# Patient Record
Sex: FEMALE | Race: WHITE | HISPANIC OR LATINO | Employment: FULL TIME | URBAN - METROPOLITAN AREA
[De-identification: names, ages, dates, MRNs, and addresses within clinical notes are randomized per-mention and may not be internally consistent; named-entity substitution may affect disease eponyms.]

---

## 2017-02-13 ENCOUNTER — APPOINTMENT (OUTPATIENT)
Dept: GENERAL RADIOLOGY | Facility: HOSPITAL | Age: 57
End: 2017-02-13

## 2017-02-13 ENCOUNTER — HOSPITAL ENCOUNTER (EMERGENCY)
Facility: HOSPITAL | Age: 57
Discharge: HOME | End: 2017-02-13
Attending: EMERGENCY MEDICINE | Admitting: EMERGENCY MEDICINE

## 2017-02-13 VITALS
WEIGHT: 228 LBS | OXYGEN SATURATION: 92 % | BODY MASS INDEX: 41.96 KG/M2 | DIASTOLIC BLOOD PRESSURE: 71 MMHG | TEMPERATURE: 99.4 F | SYSTOLIC BLOOD PRESSURE: 134 MMHG | RESPIRATION RATE: 22 BRPM | HEART RATE: 93 BPM | HEIGHT: 62 IN

## 2017-02-13 DIAGNOSIS — J10.1 INFLUENZA A: Primary | ICD-10-CM

## 2017-02-13 LAB
ALBUMIN SERPL-MCNC: 4.3 G/DL (ref 3.2–4.8)
ALBUMIN/GLOB SERPL: 1.2 G/DL (ref 1.5–2.5)
ALP SERPL-CCNC: 117 U/L (ref 25–100)
ALT SERPL W P-5'-P-CCNC: 52 U/L (ref 7–40)
ANION GAP SERPL CALCULATED.3IONS-SCNC: 1 MMOL/L (ref 3–11)
AST SERPL-CCNC: 35 U/L (ref 0–33)
BASOPHILS # BLD AUTO: 0.01 10*3/MM3 (ref 0–0.2)
BASOPHILS NFR BLD AUTO: 0.1 % (ref 0–1)
BILIRUB SERPL-MCNC: 0.7 MG/DL (ref 0.3–1.2)
BNP SERPL-MCNC: 52 PG/ML (ref 0–100)
BUN BLD-MCNC: 11 MG/DL (ref 9–23)
BUN/CREAT SERPL: 18.3 (ref 7–25)
CALCIUM SPEC-SCNC: 9.8 MG/DL (ref 8.7–10.4)
CHLORIDE SERPL-SCNC: 98 MMOL/L (ref 99–109)
CO2 SERPL-SCNC: 35 MMOL/L (ref 20–31)
CREAT BLD-MCNC: 0.6 MG/DL (ref 0.6–1.3)
DEPRECATED RDW RBC AUTO: 41.4 FL (ref 37–54)
EOSINOPHIL # BLD AUTO: 0.03 10*3/MM3 (ref 0.1–0.3)
EOSINOPHIL NFR BLD AUTO: 0.4 % (ref 0–3)
ERYTHROCYTE [DISTWIDTH] IN BLOOD BY AUTOMATED COUNT: 12.7 % (ref 11.3–14.5)
FLUAV AG NPH QL: POSITIVE
FLUBV AG NPH QL IA: NEGATIVE
GFR SERPL CREATININE-BSD FRML MDRD: 103 ML/MIN/1.73
GLOBULIN UR ELPH-MCNC: 3.5 GM/DL
GLUCOSE BLD-MCNC: 117 MG/DL (ref 70–100)
HCT VFR BLD AUTO: 43.6 % (ref 34.5–44)
HGB BLD-MCNC: 14.7 G/DL (ref 11.5–15.5)
HOLD SPECIMEN: NORMAL
HOLD SPECIMEN: NORMAL
IMM GRANULOCYTES # BLD: 0.03 10*3/MM3 (ref 0–0.03)
IMM GRANULOCYTES NFR BLD: 0.4 % (ref 0–0.6)
LIPASE SERPL-CCNC: 29 U/L (ref 6–51)
LYMPHOCYTES # BLD AUTO: 0.58 10*3/MM3 (ref 0.6–4.8)
LYMPHOCYTES NFR BLD AUTO: 7.4 % (ref 24–44)
MCH RBC QN AUTO: 30.2 PG (ref 27–31)
MCHC RBC AUTO-ENTMCNC: 33.7 G/DL (ref 32–36)
MCV RBC AUTO: 89.5 FL (ref 80–99)
MONOCYTES # BLD AUTO: 0.7 10*3/MM3 (ref 0–1)
MONOCYTES NFR BLD AUTO: 9 % (ref 0–12)
NEUTROPHILS # BLD AUTO: 6.44 10*3/MM3 (ref 1.5–8.3)
NEUTROPHILS NFR BLD AUTO: 82.7 % (ref 41–71)
PLATELET # BLD AUTO: 201 10*3/MM3 (ref 150–450)
PMV BLD AUTO: 11.2 FL (ref 6–12)
POTASSIUM BLD-SCNC: 3.9 MMOL/L (ref 3.5–5.5)
PROT SERPL-MCNC: 7.8 G/DL (ref 5.7–8.2)
RBC # BLD AUTO: 4.87 10*6/MM3 (ref 3.89–5.14)
SODIUM BLD-SCNC: 134 MMOL/L (ref 132–146)
TROPONIN I SERPL-MCNC: 0 NG/ML (ref 0–0.07)
WBC NRBC COR # BLD: 7.79 10*3/MM3 (ref 3.5–10.8)
WHOLE BLOOD HOLD SPECIMEN: NORMAL
WHOLE BLOOD HOLD SPECIMEN: NORMAL

## 2017-02-13 PROCEDURE — 25010000002 KETOROLAC TROMETHAMINE PER 15 MG: Performed by: EMERGENCY MEDICINE

## 2017-02-13 PROCEDURE — 85025 COMPLETE CBC W/AUTO DIFF WBC: CPT | Performed by: EMERGENCY MEDICINE

## 2017-02-13 PROCEDURE — 93005 ELECTROCARDIOGRAM TRACING: CPT

## 2017-02-13 PROCEDURE — 96374 THER/PROPH/DIAG INJ IV PUSH: CPT

## 2017-02-13 PROCEDURE — 83880 ASSAY OF NATRIURETIC PEPTIDE: CPT | Performed by: EMERGENCY MEDICINE

## 2017-02-13 PROCEDURE — 99285 EMERGENCY DEPT VISIT HI MDM: CPT

## 2017-02-13 PROCEDURE — 87804 INFLUENZA ASSAY W/OPTIC: CPT | Performed by: EMERGENCY MEDICINE

## 2017-02-13 PROCEDURE — 80053 COMPREHEN METABOLIC PANEL: CPT | Performed by: EMERGENCY MEDICINE

## 2017-02-13 PROCEDURE — 83690 ASSAY OF LIPASE: CPT | Performed by: EMERGENCY MEDICINE

## 2017-02-13 PROCEDURE — 84484 ASSAY OF TROPONIN QUANT: CPT

## 2017-02-13 PROCEDURE — 71020 HC CHEST PA AND LATERAL: CPT

## 2017-02-13 PROCEDURE — 96361 HYDRATE IV INFUSION ADD-ON: CPT

## 2017-02-13 RX ORDER — KETOROLAC TROMETHAMINE 15 MG/ML
15 INJECTION, SOLUTION INTRAMUSCULAR; INTRAVENOUS ONCE
Status: COMPLETED | OUTPATIENT
Start: 2017-02-13 | End: 2017-02-13

## 2017-02-13 RX ORDER — OSELTAMIVIR PHOSPHATE 75 MG/1
75 CAPSULE ORAL 2 TIMES DAILY
Qty: 10 CAPSULE | Refills: 0 | OUTPATIENT
Start: 2017-02-13 | End: 2020-11-27

## 2017-02-13 RX ORDER — ACETAMINOPHEN 500 MG
1000 TABLET ORAL ONCE
Status: COMPLETED | OUTPATIENT
Start: 2017-02-13 | End: 2017-02-13

## 2017-02-13 RX ORDER — SODIUM CHLORIDE 0.9 % (FLUSH) 0.9 %
10 SYRINGE (ML) INJECTION AS NEEDED
Status: DISCONTINUED | OUTPATIENT
Start: 2017-02-13 | End: 2017-02-13 | Stop reason: HOSPADM

## 2017-02-13 RX ADMIN — SODIUM CHLORIDE 1000 ML: 9 INJECTION, SOLUTION INTRAVENOUS at 12:54

## 2017-02-13 RX ADMIN — ACETAMINOPHEN 1000 MG: 500 TABLET ORAL at 12:51

## 2017-02-13 RX ADMIN — KETOROLAC TROMETHAMINE 15 MG: 15 INJECTION, SOLUTION INTRAMUSCULAR; INTRAVENOUS at 12:54

## 2017-02-13 NOTE — ED PROVIDER NOTES
Subjective   HPI Comments: Mrs. Velazquez is a 56 y.o female who presents to the ED c/o flu like sx starting 2 days ago. She reports of an initial dry worsening cough after getting out of the shower two days ago. She has tried taking mucinex without any relief and caused her to experience N/V. She also complains of SOA, fever, chills, runny nose, sore throat, HA, myalgias, and left sided chest pain. She denies taking any other medications other than the mucinex. She has no other acute sx at this time.     Patient is a 56 y.o. female presenting with flu symptoms.   History provided by:  Patient  Flu Symptoms   Presenting symptoms: cough, fever, headache, myalgias, nausea, rhinorrhea, shortness of breath, sore throat and vomiting    Severity:  Moderate  Onset quality:  Sudden  Duration:  2 days  Progression:  Worsening  Chronicity:  New  Relieved by:  Nothing  Worsened by:  Nothing  Ineffective treatments:  Decongestant  Associated symptoms: chills        Review of Systems   Constitutional: Positive for chills and fever.   HENT: Positive for rhinorrhea and sore throat.    Respiratory: Positive for cough and shortness of breath.    Cardiovascular: Positive for chest pain.   Gastrointestinal: Positive for nausea and vomiting.   Musculoskeletal: Positive for myalgias.   Neurological: Positive for headaches.   All other systems reviewed and are negative.      History reviewed. No pertinent past medical history.    No Known Allergies    Past Surgical History   Procedure Laterality Date   • Hysterectomy         History reviewed. No pertinent family history.    Social History     Social History   • Marital status:      Spouse name: N/A   • Number of children: N/A   • Years of education: N/A     Social History Main Topics   • Smoking status: Never Smoker   • Smokeless tobacco: None   • Alcohol use No   • Drug use: No   • Sexual activity: Not Asked     Other Topics Concern   • None     Social History Narrative          Objective   Physical Exam   Constitutional: She is oriented to person, place, and time. She appears well-developed and well-nourished. No distress.   HENT:   Head: Normocephalic and atraumatic.   Right Ear: Tympanic membrane normal.   Left Ear: Tympanic membrane normal.   Mouth/Throat: Posterior oropharyngeal erythema present. No oropharyngeal exudate or posterior oropharyngeal edema.   Eyes: Conjunctivae are normal.   Neck: Normal range of motion. Neck supple.   Cardiovascular: Normal rate, regular rhythm, normal heart sounds and intact distal pulses.    Pulmonary/Chest: Effort normal and breath sounds normal. No respiratory distress.   Abdominal: Soft. There is no tenderness.   Musculoskeletal: Normal range of motion.   Neurological: She is alert and oriented to person, place, and time. She has normal strength.   Skin: Skin is warm and dry.   Psychiatric: She has a normal mood and affect. Her behavior is normal.   Nursing note and vitals reviewed.      Procedures         ED Course  ED Course   Comment By Time   I spoke with Mrs. Velazquez about the positive flu swab.  She tells me it's been less than 48 hours she started feeling bad.  Will prescribe Tamiflu and write her off work for 3 days.  We'll proceed with IV fluids Tylenol and Toradol and then discharge. Severo Garvin MD 02/13 1204                     MDM  Number of Diagnoses or Management Options  Influenza A: new and requires workup     Amount and/or Complexity of Data Reviewed  Clinical lab tests: ordered and reviewed  Tests in the radiology section of CPT®: ordered and reviewed  Independent visualization of images, tracings, or specimens: yes    Patient Progress  Patient progress: improved      Final diagnoses:   Influenza A       Documentation assistance provided by brittni CHIU.  Information recorded by the brittni was done at my direction and has been verified and validated by me.     Jah Chiu  02/13/17 1135       Jah Chiu  02/13/17  1135       Severo Garvin MD  02/13/17 1909

## 2017-11-07 ENCOUNTER — TRANSCRIBE ORDERS (OUTPATIENT)
Dept: MAMMOGRAPHY | Facility: HOSPITAL | Age: 57
End: 2017-11-07

## 2017-11-07 DIAGNOSIS — Z12.31 VISIT FOR SCREENING MAMMOGRAM: Primary | ICD-10-CM

## 2017-11-09 ENCOUNTER — APPOINTMENT (OUTPATIENT)
Dept: MAMMOGRAPHY | Facility: HOSPITAL | Age: 57
End: 2017-11-09

## 2019-01-21 ENCOUNTER — APPOINTMENT (OUTPATIENT)
Dept: GENERAL RADIOLOGY | Facility: HOSPITAL | Age: 59
End: 2019-01-21

## 2019-01-21 ENCOUNTER — HOSPITAL ENCOUNTER (EMERGENCY)
Facility: HOSPITAL | Age: 59
Discharge: HOME | End: 2019-01-21
Attending: EMERGENCY MEDICINE | Admitting: EMERGENCY MEDICINE

## 2019-01-21 VITALS
DIASTOLIC BLOOD PRESSURE: 75 MMHG | SYSTOLIC BLOOD PRESSURE: 168 MMHG | HEART RATE: 79 BPM | OXYGEN SATURATION: 94 % | HEIGHT: 62 IN | RESPIRATION RATE: 18 BRPM | BODY MASS INDEX: 41.96 KG/M2 | WEIGHT: 228 LBS | TEMPERATURE: 98.9 F

## 2019-01-21 DIAGNOSIS — B34.9 VIRAL ILLNESS: ICD-10-CM

## 2019-01-21 DIAGNOSIS — J20.8 VIRAL BRONCHITIS: Primary | ICD-10-CM

## 2019-01-21 LAB
ALBUMIN SERPL-MCNC: 4.16 G/DL (ref 3.2–4.8)
ALBUMIN/GLOB SERPL: 1.5 G/DL (ref 1.5–2.5)
ALP SERPL-CCNC: 110 U/L (ref 25–100)
ALT SERPL W P-5'-P-CCNC: 57 U/L (ref 7–40)
ANION GAP SERPL CALCULATED.3IONS-SCNC: 6 MMOL/L (ref 3–11)
AST SERPL-CCNC: 33 U/L (ref 0–33)
BASOPHILS # BLD AUTO: 0.01 10*3/MM3 (ref 0–0.2)
BASOPHILS NFR BLD AUTO: 0.1 % (ref 0–1)
BILIRUB SERPL-MCNC: 0.5 MG/DL (ref 0.3–1.2)
BNP SERPL-MCNC: 49 PG/ML (ref 0–100)
BUN BLD-MCNC: 10 MG/DL (ref 9–23)
BUN/CREAT SERPL: 16.1 (ref 7–25)
CALCIUM SPEC-SCNC: 9 MG/DL (ref 8.7–10.4)
CHLORIDE SERPL-SCNC: 102 MMOL/L (ref 99–109)
CO2 SERPL-SCNC: 28 MMOL/L (ref 20–31)
CREAT BLD-MCNC: 0.62 MG/DL (ref 0.6–1.3)
DEPRECATED RDW RBC AUTO: 39.6 FL (ref 37–54)
EOSINOPHIL # BLD AUTO: 0.09 10*3/MM3 (ref 0–0.3)
EOSINOPHIL NFR BLD AUTO: 1.3 % (ref 0–3)
ERYTHROCYTE [DISTWIDTH] IN BLOOD BY AUTOMATED COUNT: 12.2 % (ref 11.3–14.5)
FLUAV AG NPH QL: NEGATIVE
FLUBV AG NPH QL IA: NEGATIVE
GFR SERPL CREATININE-BSD FRML MDRD: 99 ML/MIN/1.73
GLOBULIN UR ELPH-MCNC: 2.8 GM/DL
GLUCOSE BLD-MCNC: 114 MG/DL (ref 70–100)
HCT VFR BLD AUTO: 41.9 % (ref 34.5–44)
HGB BLD-MCNC: 13.7 G/DL (ref 11.5–15.5)
HOLD SPECIMEN: NORMAL
HOLD SPECIMEN: NORMAL
IMM GRANULOCYTES # BLD AUTO: 0.02 10*3/MM3 (ref 0–0.03)
IMM GRANULOCYTES NFR BLD AUTO: 0.3 % (ref 0–0.6)
LYMPHOCYTES # BLD AUTO: 0.96 10*3/MM3 (ref 0.6–4.8)
LYMPHOCYTES NFR BLD AUTO: 13.8 % (ref 24–44)
MCH RBC QN AUTO: 29.5 PG (ref 27–31)
MCHC RBC AUTO-ENTMCNC: 32.7 G/DL (ref 32–36)
MCV RBC AUTO: 90.1 FL (ref 80–99)
MONOCYTES # BLD AUTO: 0.64 10*3/MM3 (ref 0–1)
MONOCYTES NFR BLD AUTO: 9.2 % (ref 0–12)
NEUTROPHILS # BLD AUTO: 5.27 10*3/MM3 (ref 1.5–8.3)
NEUTROPHILS NFR BLD AUTO: 75.6 % (ref 41–71)
PLATELET # BLD AUTO: 207 10*3/MM3 (ref 150–450)
PMV BLD AUTO: 11.3 FL (ref 6–12)
POTASSIUM BLD-SCNC: 3.7 MMOL/L (ref 3.5–5.5)
PROT SERPL-MCNC: 7 G/DL (ref 5.7–8.2)
RBC # BLD AUTO: 4.65 10*6/MM3 (ref 3.89–5.14)
SODIUM BLD-SCNC: 136 MMOL/L (ref 132–146)
WBC NRBC COR # BLD: 6.97 10*3/MM3 (ref 3.5–10.8)
WHOLE BLOOD HOLD SPECIMEN: NORMAL
WHOLE BLOOD HOLD SPECIMEN: NORMAL

## 2019-01-21 PROCEDURE — 80053 COMPREHEN METABOLIC PANEL: CPT | Performed by: EMERGENCY MEDICINE

## 2019-01-21 PROCEDURE — 99283 EMERGENCY DEPT VISIT LOW MDM: CPT

## 2019-01-21 PROCEDURE — 87804 INFLUENZA ASSAY W/OPTIC: CPT | Performed by: EMERGENCY MEDICINE

## 2019-01-21 PROCEDURE — 83880 ASSAY OF NATRIURETIC PEPTIDE: CPT | Performed by: EMERGENCY MEDICINE

## 2019-01-21 PROCEDURE — 94640 AIRWAY INHALATION TREATMENT: CPT

## 2019-01-21 PROCEDURE — 71045 X-RAY EXAM CHEST 1 VIEW: CPT

## 2019-01-21 PROCEDURE — 85025 COMPLETE CBC W/AUTO DIFF WBC: CPT | Performed by: EMERGENCY MEDICINE

## 2019-01-21 PROCEDURE — 93005 ELECTROCARDIOGRAM TRACING: CPT | Performed by: EMERGENCY MEDICINE

## 2019-01-21 RX ORDER — IBUPROFEN 400 MG/1
400 TABLET ORAL ONCE
Status: COMPLETED | OUTPATIENT
Start: 2019-01-21 | End: 2019-01-21

## 2019-01-21 RX ORDER — ALBUTEROL SULFATE 2.5 MG/3ML
2.5 SOLUTION RESPIRATORY (INHALATION) ONCE
Status: COMPLETED | OUTPATIENT
Start: 2019-01-21 | End: 2019-01-21

## 2019-01-21 RX ORDER — SODIUM CHLORIDE 0.9 % (FLUSH) 0.9 %
10 SYRINGE (ML) INJECTION AS NEEDED
Status: DISCONTINUED | OUTPATIENT
Start: 2019-01-21 | End: 2019-01-21 | Stop reason: HOSPADM

## 2019-01-21 RX ORDER — INHALER, ASSIST DEVICES
SPACER (EA) MISCELLANEOUS
Qty: 1 EACH | Refills: 0 | Status: SHIPPED | OUTPATIENT
Start: 2019-01-21 | End: 2020-01-21

## 2019-01-21 RX ORDER — BENZONATATE 100 MG/1
100 CAPSULE ORAL 3 TIMES DAILY PRN
Qty: 12 CAPSULE | Refills: 0 | OUTPATIENT
Start: 2019-01-21 | End: 2020-11-27

## 2019-01-21 RX ORDER — ALBUTEROL SULFATE 90 UG/1
2 AEROSOL, METERED RESPIRATORY (INHALATION) EVERY 6 HOURS PRN
Qty: 8 G | Refills: 0 | OUTPATIENT
Start: 2019-01-21 | End: 2020-11-27

## 2019-01-21 RX ADMIN — ALBUTEROL SULFATE 2.5 MG: 2.5 SOLUTION RESPIRATORY (INHALATION) at 15:31

## 2019-01-21 RX ADMIN — IBUPROFEN 400 MG: 400 TABLET ORAL at 15:36

## 2019-01-21 NOTE — ED PROVIDER NOTES
"Yosvany Velazquez is a 58 y.o.female who presents to the ED with complaints of shortness of breath. The patient reports her shortness of breath has been constant since it onset yesterday. She has not taken any medication for her discomfort. She also complains of chest pain, headache, back pain, and a productive cough. Additionally, she was \"sick\" on 1/7 and was evaluated by her PCP and diagnosed with pneumonia the next day. Therefore, she was prescribed an antibiotic which she was compliant with taking. She denies any recent sick contacts. There are no other complaints at this time.         History provided by:  Patient  Shortness of Breath   Severity:  Moderate  Onset quality:  Sudden  Duration:  2 days  Timing:  Constant  Progression:  Unchanged  Chronicity:  New  Relieved by:  None tried  Worsened by:  Nothing  Ineffective treatments:  None tried  Associated symptoms: chest pain, cough and headaches    Associated symptoms: no sore throat and no wheezing    Chest pain:     Quality: burning    Cough:     Cough characteristics:  Productive  Risk factors: no tobacco use        Review of Systems   HENT: Positive for postnasal drip and voice change. Negative for ear discharge, rhinorrhea and sore throat.    Respiratory: Positive for cough and shortness of breath. Negative for wheezing.    Cardiovascular: Positive for chest pain. Negative for palpitations and leg swelling.   Musculoskeletal: Positive for back pain.   Neurological: Positive for headaches.   Hematological: Negative for adenopathy.   Psychiatric/Behavioral: Negative.    All other systems reviewed and are negative.      History reviewed. No pertinent past medical history.    No Known Allergies    Past Surgical History:   Procedure Laterality Date   • HYSTERECTOMY         History reviewed. No pertinent family history.    Social History     Socioeconomic History   • Marital status:      Spouse name: Not on file   • Number of children: Not " on file   • Years of education: Not on file   • Highest education level: Not on file   Tobacco Use   • Smoking status: Never Smoker   Substance and Sexual Activity   • Alcohol use: No   • Drug use: No         Objective   Physical Exam   Constitutional: She is oriented to person, place, and time. She appears well-developed and well-nourished. No distress.   HENT:   Head: Normocephalic and atraumatic.   Nose: Nose normal.   Fluid behind bilateral TM with no redness. Drainage to the posterior oropharynx.    Eyes: Conjunctivae are normal. No scleral icterus.   Neck: Normal range of motion. Neck supple.   Cardiovascular: Normal rate, regular rhythm, normal heart sounds and intact distal pulses.   No murmur heard.  Pulmonary/Chest: Effort normal and breath sounds normal. No respiratory distress.   Hoarse voice   Abdominal: Soft. Bowel sounds are normal. There is no tenderness.   Obese.    Musculoskeletal: Normal range of motion. She exhibits no edema.   Neurological: She is alert and oriented to person, place, and time.   Skin: Skin is warm and dry.   Psychiatric: She has a normal mood and affect. Her behavior is normal.   Nursing note and vitals reviewed.      Procedures         ED Course     No results found for this or any previous visit (from the past 24 hour(s)).  Note: In addition to lab results from this visit, the labs listed above may include labs taken at another facility or during a different encounter within the last 24 hours. Please correlate lab times with ED admission and discharge times for further clarification of the services performed during this visit.    XR Chest 1 View   Final Result   No acute cardiopulmonary process.       D:  01/21/2019   E:  01/21/2019       This report was finalized on 1/21/2019 5:02 PM by Dr. Stu Bergman.            Vitals:    01/21/19 1445 01/21/19 1531 01/21/19 1659   BP: 172/89  168/75   BP Location: Left arm  Left arm   Patient Position: Sitting  Sitting   Pulse: 95 86 79  "  Resp: 18  18   Temp: 98.7 °F (37.1 °C)  98.9 °F (37.2 °C)   TempSrc: Oral  Oral   SpO2: 92%  94%   Weight: 103 kg (228 lb)     Height: 157.5 cm (62\")       Medications   ibuprofen (ADVIL,MOTRIN) tablet 400 mg (400 mg Oral Given 1/21/19 1536)   albuterol (PROVENTIL) nebulizer solution 0.083% 2.5 mg/3mL (2.5 mg Nebulization Given 1/21/19 1531)     ECG/EMG Results (last 24 hours)     Procedure Component Value Units Date/Time    ECG 12 Lead [[id number]] Collected:  01/21/19 1451     Updated:  01/21/19 1453        ECG 12 Lead                             MDM  Number of Diagnoses or Management Options  Viral bronchitis: new and requires workup  Viral illness: new and requires workup     Amount and/or Complexity of Data Reviewed  Clinical lab tests: reviewed and ordered  Tests in the radiology section of CPT®: reviewed and ordered  Tests in the medicine section of CPT®: reviewed and ordered  Discuss the patient with other providers: yes    Patient Progress  Patient progress: stable      Final diagnoses:   Viral bronchitis   Viral illness       Documentation assistance provided by brittni Lo.  Information recorded by the brittni was done at my direction and has been verified and validated by me.     James Lo  01/21/19 7482       Jose Rainey PA  01/25/19 0711    "

## 2020-02-18 ENCOUNTER — HOSPITAL ENCOUNTER (OUTPATIENT)
Dept: GENERAL RADIOLOGY | Facility: HOSPITAL | Age: 60
Discharge: HOME | End: 2020-02-18
Admitting: NURSE PRACTITIONER

## 2020-02-18 ENCOUNTER — TRANSCRIBE ORDERS (OUTPATIENT)
Dept: GENERAL RADIOLOGY | Facility: HOSPITAL | Age: 60
End: 2020-02-18

## 2020-02-18 DIAGNOSIS — M25.511 RIGHT SHOULDER PAIN, UNSPECIFIED CHRONICITY: Primary | ICD-10-CM

## 2020-02-18 DIAGNOSIS — M25.511 RIGHT SHOULDER PAIN, UNSPECIFIED CHRONICITY: ICD-10-CM

## 2020-02-18 PROCEDURE — 73030 X-RAY EXAM OF SHOULDER: CPT

## 2020-02-24 ENCOUNTER — OFFICE VISIT (OUTPATIENT)
Dept: ORTHOPEDIC SURGERY | Facility: CLINIC | Age: 60
End: 2020-02-24

## 2020-02-24 VITALS — OXYGEN SATURATION: 99 % | HEART RATE: 69 BPM | WEIGHT: 233.91 LBS | BODY MASS INDEX: 43.04 KG/M2 | HEIGHT: 62 IN

## 2020-02-24 DIAGNOSIS — M75.01 ADHESIVE CAPSULITIS OF RIGHT SHOULDER: Primary | ICD-10-CM

## 2020-02-24 PROCEDURE — 99203 OFFICE O/P NEW LOW 30 MIN: CPT | Performed by: ORTHOPAEDIC SURGERY

## 2020-02-24 RX ORDER — NAPROXEN 500 MG/1
500 TABLET ORAL 2 TIMES DAILY
Qty: 180 TABLET | Refills: 3 | OUTPATIENT
Start: 2020-02-24 | End: 2020-11-27

## 2020-02-24 NOTE — PROGRESS NOTES
[facility] [place] Note    Subjective     Chief Complaint   Patient presents with   • Right Shoulder - Pain        HPI      Eliz Velazquez is a 59 y.o. female who presents with right shoulder pain.  Onset: The issue has been ongoing for 3 week(s). Pain is a 8/10 on the pain scale. Pain is described as throbbing and stabbing. Associated symptoms include pain and swelling. The pain is worse with sleeping and working; bengay improve the pain. Previous treatments have included: nothing.    I have reviewed the following portions of the patient's history:History of Present Illness    She had acute onset of pain 3 weeks ago.  No pre-existing injury or problem.  She works as a cook.  She denies being Worker's Comp.  Her main problem is pain and stiffness right shoulder      History reviewed. No pertinent past medical history.   Past Surgical History:   Procedure Laterality Date   • HYSTERECTOMY        Family History   Problem Relation Age of Onset   • No Known Problems Mother    • No Known Problems Father      Social History     Socioeconomic History   • Marital status:      Spouse name: Not on file   • Number of children: Not on file   • Years of education: Not on file   • Highest education level: Not on file   Tobacco Use   • Smoking status: Never Smoker   • Smokeless tobacco: Never Used   Substance and Sexual Activity   • Alcohol use: No   • Drug use: No   • Sexual activity: Defer      Current Outpatient Medications on File Prior to Visit   Medication Sig Dispense Refill   • albuterol sulfate  (90 Base) MCG/ACT inhaler Inhale 2 puffs Every 6 (Six) Hours As Needed for Wheezing. 8 g 0   • benzonatate (TESSALON) 100 MG capsule Take 1 capsule by mouth 3 (Three) Times a Day As Needed for Cough. 12 capsule 0   • hepatitis A (HAVRIX) 1440 EL U/ML vaccine Inject 1 mL into the appropriate muscle as directed by prescriber. Repeat in 6 months 1 mL 1   • oseltamivir (TAMIFLU) 75 MG capsule Take 1  "capsule by mouth 2 (Two) Times a Day. 10 capsule 0     No current facility-administered medications on file prior to visit.       No Known Allergies     The following portions of the patient's history were reviewed and updated as appropriate: allergies, current medications, past family history, past medical history, past social history, past surgical history and problem list.    Review of Systems   HENT: Positive for ear pain, postnasal drip and sinus pressure.    Eyes: Negative.    Respiratory: Positive for choking.    Cardiovascular: Negative.    Gastrointestinal: Negative.    Endocrine: Negative.    Genitourinary: Negative.    Musculoskeletal: Positive for arthralgias and joint swelling.   Skin: Negative.    Allergic/Immunologic: Positive for environmental allergies.   Neurological: Positive for dizziness, weakness (right arm) and light-headedness.   Hematological: Negative.    Psychiatric/Behavioral: Negative.         Objective      Physical Exam  Pulse 69   Ht 157.5 cm (62.01\")   Wt 106 kg (233 lb 14.5 oz)   SpO2 99%   BMI 42.77 kg/m²     Body mass index is 42.77 kg/m².    GENERAL APPEARANCE: awake, alert & oriented x 3, in no acute distress and well developed, well nourished  PSYCH: normal mood and affect  LUNGS:  breathing nonlabored, no wheezing  EYES: sclera anicteric, pupils equal  CARDIOVASCULAR: palpable pulses dorsalis pedis, palpable posterior tibial bilaterally. Capillary refill less than 2 seconds  INTEGUMENTARY: skin intact, no clubbing, cyanosis  NEUROLOGIC:  Normal Sensation and reflexes       Ortho Exam  Musculoskeletal   Upper Extremity   Right Shoulder     Inspection and Palpation:     Tenderness - none    Crepitus - none    Sensation is normal    Examination reveals no ecchymosis.      Strength and Tone:    Supraspinatus,  Infraspinatus - 5/5    Subscapularis - 5/5    Deltoid - 5/5  Range of Motion   Left shoulder:    Internal Rotation: ROM - T7    External Rotation: AROM - 80 " degrees    Elevation through flexion: AROM - 180 degrees    Right Shoulder:    Internal Rotation: ROM - T7    External Rotation: AROM - 30 degrees    Elevation through flexion: AROM - 60 degrees     Abduction -60 degrees  Instability   Right shoulder    Sulcus sign negative    Apprehension test negative    Cullen relocation test negative    Jerk test negative   Impingement   Right shoulder    Cardenas impingement test positive    Neer impingement test positive   Functional Testing   Right shoulder    AC crossover adduction test negative    Abdominal compression test all functional testing painful  Imaging/Studies  Imaging Results (Last 7 Days)     ** No results found for the last 168 hours. **      I viewed her x-rays from February 18 which are negative    Assessment/Plan        ICD-10-CM ICD-9-CM   1. Adhesive capsulitis of right shoulder M75.01 726.0       Orders Placed This Encounter   Procedures   • Ambulatory Referral to Physical Therapy      I have ordered physical therapy and prescription for Naprosyn.  I will see her back in 3 to 4 weeks.  If she does not get better we will consider cortisone injection and then surgery if conservative management fails.  Her prognosis is good.  She does not have diabetes or smoke.  She is working full duty.  She plans to go to physical therapy [facility] [address]  Medical Decision Making  Management Options : prescription/IM medicine and physical/occupational therapy  Data/Risk: radiology tests and independent visualization of imaging, lab tests, or EMG/NCV    Fausto Jennings MD  02/24/20  8:53 AM         EMR Dragon/Transcription disclaimer:  Much of this encounter note is an electronic transcription of spoken language to printed text. Electronic transcription of spoken language may permit erroneous, or at times, nonsensical words or phrases to be inadvertently transcribed. Although I have reviewed the note for such errors, some may still exist.

## 2020-03-12 ENCOUNTER — HOSPITAL ENCOUNTER (OUTPATIENT)
Dept: PHYSICAL THERAPY | Facility: HOSPITAL | Age: 60
Setting detail: THERAPIES SERIES
Discharge: HOME | End: 2020-03-12

## 2020-03-12 DIAGNOSIS — M25.511 ACUTE PAIN OF RIGHT SHOULDER: ICD-10-CM

## 2020-03-12 DIAGNOSIS — M75.01 ADHESIVE CAPSULITIS OF RIGHT SHOULDER: Primary | ICD-10-CM

## 2020-03-12 PROCEDURE — 97161 PT EVAL LOW COMPLEX 20 MIN: CPT

## 2020-03-12 NOTE — THERAPY EVALUATION
"    Outpatient Physical Therapy Ortho Initial Evaluation  [facility]     Patient Name: Eliz Velazquez  : 1960  MRN: [id number]  Today's Date: 3/12/2020      Visit Date: 2020    There is no problem list on file for this patient.    No past medical history on file.     Past Surgical History:   Procedure Laterality Date   • HYSTERECTOMY       Visit Dx:     ICD-10-CM ICD-9-CM   1. Adhesive capsulitis of right shoulder M75.01 726.0   2. Acute pain of right shoulder M25.511 719.41     Patient History     Row Name 20 0930             History    Chief Complaint  Pain;Muscle weakness;Joint stiffness;Difficulty with daily activities  -AC      Type of Pain  Shoulder pain  -AC      Date Current Problem(s) Began  -- 5 weeks ago  -AC      Brief Description of Current Complaint  Pt presents with onset of R shoulder pain five weeks ago. Thought it was related to having new, heavier friers at work. Went to Dr. Jennings who diagnosed her with frozen shoulder. Has been using BenGay, the only thing she can do to allow her to sleep. Was prescribed Naprosyn but states it has not been called in. Feels like \"the muscles are falling off the bone and they're stabbing me.\" She said she can raise her arm overhead with axillary massage, has improved since initial onset. Feels pulling with elevating arm. Has difficulty reaching behind back and to reach cubbards. Having to use L more at work.   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  -AC      Patient seeing anyone else for problem(s)?  Dick  -AC      How has patient tried to help current problem?  BenGay  -AC      What " "clinical tests have you had for this problem?  X-ray  -AC      Results of Clinical Tests  Mild degenerative changes  -AC      History of Previous Related Injuries  Fell going down the stairs/pulled shoulders on railing with L shoulder pain; diagnosed as \"severely arthritic\" per pt report (9-10 years ago)  -AC         Pain     Pain Location  Shoulder  -AC      Pain at Present  6  -AC      Pain at Best  5  -AC      Pain at Worst  10  -AC      Pain Frequency  Intermittent  -AC      Pain Description  Other (Comment);Tightness \"bands that are so tight, hard to stretch them\"  -AC      What Performance Factors Make the Current Problem(s) WORSE?  Reaching overhead, behind back, washing tables/making pizza (spreading sauce)  -AC      What Performance Factors Make the Current Problem(s) BETTER?  Holding arm close to side  -AC      Is your sleep disturbed?  Yes  -AC      Is medication used to assist with sleep?  Yes  -AC      What position do you sleep in?  Right sidelying;Left sidelying Unable to on R side now  -AC      Difficulties with ADL's?  Reaching cubbards, anything above counter level; washing back  -AC      Difficulties with recreational activities?  Difficulties crocheting >30 mins (usually 45 mins-1 hour)  -AC         Fall Risk Assessment    Any falls in the past year:  No  -AC         Daily Activities    Primary Language  English  -AC      Are you able to read  Yes  -AC      Are you able to write  Yes  -AC      How does patient learn best?  Listening;Reading;Demonstration  -AC      Teaching needs identified  Home Exercise Program  -AC      Patient is concerned about/has problems with  Performing home management (household chores, shopping, care of dependents);Performing job responsibilities/community activities (work, school,;Repetitive movements of the hand, arm, shoulder  -AC      Does patient have problems with the following?  None  -AC      Barriers to learning  None  -AC      Pt Participated in POC and Goals  " Yes  -AC         Safety    Are you being neglected by a caregiver  No  -AC        User Key  (r) = Recorded By, (t) = Taken By, (c) = Cosigned By    Initials Name Provider Type    AC Ariana James, PT Physical Therapist        PT Ortho     Row Name 03/12/20 1020       Posture/Observations    Alignment Options  Forward head;Lumbar lordosis;Rounded shoulders  -AC    Forward Head  Moderate;Increased  -AC    Rounded Shoulders  Bilateral:;Moderate;Increased  -AC    Lumbar lordosis  Decreased  -AC       Quarter Clearing    Quarter Clearing  Upper Quarter Clearing  -AC       DTR- Upper Quarter Clearing    Biceps (C5/6)  Bilateral:;2- Normal response  -AC    Brachioradialis (C6)  Bilateral:;1- Minimal response  -AC    Triceps (C7)  Bilateral:;1- Minimal response  -AC       Sensory Screen for Light Touch- Upper Quarter Clearing    C4 (posterior shoulder)  Right:;Diminished  -AC    C5 (lateral upper arm)  Bilateral:;Intact  -AC    C6 (tip of thumb)  Bilateral:;Intact  -AC    C7 (tip of 3rd finger)  Bilateral:;Intact  -AC    C8 (tip of 5th finger)  Bilateral:;Intact  -AC    T1 (medial lower arm)  Bilateral:;Intact  -AC       Myotomal Screen- Upper Quarter Clearing    Shoulder flexion (C5)  Right:;3 (Fair);Left:;4- (Good -)  -AC    Elbow flexion/wrist extension (C6)  Right:;4 (Good);Left:;5 (Normal) Anterior shoulder pain/snapping sensation  -AC    Elbow extension/wrist flexion (C7)  Right:;4 (Good);Left:;5 (Normal)  -AC    Finger flexion/ (C8)  Right:;4- (Good -);Left:;3+ (Fair +)  -AC    Finger abduction (T1)  Bilateral:;4- (Good -)  -AC       Special Tests/Palpation    Special Tests/Palpation  Shoulder  -AC       Shoulder Girdle Accessory Motions    Shoulder Girdle Accessory Motions Tested?  Yes  -AC    Posterior glide of humerus  Hypomobile;Right pain  -AC    Anterior glide of humerus  Hypomobile;Right pain  -AC       Shoulder Girdle Palpation    Shoulder Girdle Palpation?  Yes  -AC    Long Head of Biceps   Right:;Tender;Guarded/taut  -AC    Infraspinatus  Right:;Tender;Guarded/taut  -AC    Teres Minor  Right:;Tender;Guarded/taut  -AC       General ROM    RT Upper Ext  Rt Scapula ABduction;Rt Shoulder Internal Rotation;Rt Shoulder External Rotation;Rt Shoulder Flexion  -AC    LT Upper Ext  Lt Shoulder Flexion;Lt Shoulder External Rotation;Lt Shoulder Internal Rotation;Lt Scapula ABduction  -AC       Right Upper Ext    Rt Shoulder Flexion AROM  135 with pain  -AC    Rt Shoulder External Rotation AROM  35 with pain   -AC    Rt Shoulder Internal Rotation AROM  30; FIR: to posterior pocket  -AC    Rt Scapula ABduction AROM  112 with pain lowering  -AC    Rt Upper Extremity Comments   ER/IR measured at 45 deg ABD  -AC       Left Upper Ext    Lt Shoulder Flexion AROM  142  -AC    Lt Shoulder External Rotation AROM  DELL: to T2  -AC    Lt Shoulder Internal Rotation AROM  FIR: to inferior border  -AC    Lt Scapula ABduction AROM  125  -AC       MMT (Manual Muscle Testing)    Lt Upper Ext  Lt Shoulder ABduction;Lt Shoulder Internal Rotation;Lt Shoulder External Rotation  -AC       MMT Left Upper Ext    Lt Shoulder ABduction MMT, Gross Movement  (4-/5) good minus  -AC    Lt Shoulder Internal Rotation MMT, Gross Movement  (5/5) normal  -AC    Lt Shoulder External Rotation MMT, Gross Movement  (4+/5) good plus  -AC        Strength Right    # Reps  1  -AC    Right Rung  2  -AC    Right  Test 1  49  -AC     Strength Average Right  49  -AC        Strength Left    # Reps  1  -AC    Left Rung  2  -AC    Left  Test 1  53  -AC     Strength Average Left  53  -AC       Hand  Strength     Strength Affected Side  Bilateral  -AC      User Key  (r) = Recorded By, (t) = Taken By, (c) = Cosigned By    Initials Name Provider Type    AC Ariana James, PT Physical Therapist        Therapy Education  Education Details: Pt educated on findings and POC, provided HEP including: jovanny (provided), AAROM cane  flexion/abduction/extension, and table slides into scaption.   Given: HEP  Program: New  How Provided: Verbal, Demonstration, Written  Provided to: Patient  Level of Understanding: Verbalized     PT OP Goals     Row Name 03/12/20 1020          PT Short Term Goals    STG Date to Achieve  04/02/20  -AC     STG 1  Decrease R shoulder pain by > or = 50%.  -AC     STG 1 Progress  New  -AC     STG 2  Perform independent HEP to maximize strength/mobility and decrease pain.  -AC     STG 2 Progress  New  -AC     STG 3  Improve R shoulder flexion AROM to at least 140 deg.  -AC     STG 3 Progress  New  -AC        Long Term Goals    LTG Date to Achieve  04/23/20  -AC     LTG 1  Decrease R shoulder pain by > or = 75%.  -AC     LTG 1 Progress  New  -AC     LTG 2  Improve QDASH to < or = 30%.  -AC     LTG 2 Progress  New  -AC     LTG 3  Improve R shoulder strength to grossly 4/5.  -AC     LTG 3 Progress  New  -AC     LTG 4  Enable ability to perform overhead activities, washing back, and crocheting with min-no difficulty/pain (< or = 3/10).  -AC     LTG 4 Progress  New  -AC        Time Calculation    PT Goal Re-Cert Due Date  06/10/20  -       User Key  (r) = Recorded By, (t) = Taken By, (c) = Cosigned By    Initials Name Provider Type    Ariana Kovacs, PT Physical Therapist        PT Assessment/Plan     Row Name 03/12/20 1020          PT Assessment    Functional Limitations  Performance in work activities;Limitation in home management;Performance in self-care ADL;Performance in leisure activities  -AC     Impairments  Range of motion;Posture;Joint mobility;Joint integrity;Poor body mechanics;Impaired muscle power;Impaired muscle length;Impaired muscle endurance;Muscle strength;Pain;Sensation  -AC     Assessment Comments  Pt presents with evolving symptoms with low complexity with signs consistent with R shoulder adhesive capsulitis. Pt has associated limitations in ROM, joint mobility, strength, and ability to perform  self-care/work/leisurely tasks. PT intervention is warranted to restore mobility/strength and reduce pain to return to PLOF.   -AC     Please refer to paper survey for additional self-reported information  Yes  -AC     Rehab Potential  Good  -AC     Patient/caregiver participated in establishment of treatment plan and goals  Yes  -AC     Patient would benefit from skilled therapy intervention  Yes  -AC        PT Plan    PT Frequency  2x/week  -AC     Predicted Duration of Therapy Intervention (Therapy Eval)  12 weeks   -AC     Planned CPT's?  PT EVAL LOW COMPLEXITY: 52053;PT RE-EVAL: 88093;PT MANUAL THERAPY EA 15 MIN: 19273;PT HOT/COLD PACK WC NONMCARE: 05426;PT THER PROC EA 15 MIN: 66778;PT NEUROMUSC RE-EDUCATION EA 15 MIN: 61689;PT ELECTRICAL STIM UNATTEND: ;PT THER ACT EA 15 MIN: 40576;PT SELF CARE/HOME MGMT/TRAIN EA 15: 96071;PT THER SUPP EA 15 MIN;PT THER MASS EA 15 MIN: 70764;PT THER PROC GROUP: 33588  -AC     PT Plan Comments  Incorporate heat prior to manual therapy, progressing A/AROM and introducing strengthening as tolerated. Other modalities PRN.   -AC       User Key  (r) = Recorded By, (t) = Taken By, (c) = Cosigned By    Initials Name Provider Type    Ariana Kovacs, PT Physical Therapist        Outcome Measure Options: Quick DASH  Quick DASH  Open a tight or new jar.: Severe Difficulty  Do heavy household chores (e.g., wash walls, wash floors): Moderate Difficulty  Carry a shopping bag or briefcase: Moderate Difficulty  Wash your back: Unable  Use a knife to cut food: Moderate Difficulty  Recreational activities in which you take some force or impact through your arm, should or hand (e.g. golf, hammering, tennis, etc.): Unable  During the past week, to what extent has your arm, shoulder, or hand problem interfered with your normal social activites with family, friends, neighbors or groups?: Slightly  During the past week, were you limited in your work or other regular daily activities as  a result of your arm, shoulder or hand problem?: Not limited at all  Arm, Shoulder, or hand pain: Moderate  Tingling (pins and needles) in your arm, shoulder, or hand: None  During the past week, how much difficulty have you had sleeping because of the pain in your arm, shoulder or hand?: Severe Difficulty  Number of Questions Answered: 11  Quick DASH Score: 52.27    Time Calculation:     Start Time: 1020     Therapy Charges for Today     Code Description Service Date Service Provider Modifiers Qty    50846280694  PT EVAL LOW COMPLEXITY 4 3/12/2020 Ariana James, PT GP 1        PT G-Codes  Outcome Measure Options: Quick DASH  Quick DASH Score: 52.27         Ariana James, PT  3/12/2020

## 2020-03-16 ENCOUNTER — OFFICE VISIT (OUTPATIENT)
Dept: ORTHOPEDIC SURGERY | Facility: CLINIC | Age: 60
End: 2020-03-16

## 2020-03-16 VITALS — WEIGHT: 233.69 LBS | HEART RATE: 73 BPM | BODY MASS INDEX: 43 KG/M2 | OXYGEN SATURATION: 100 % | HEIGHT: 62 IN

## 2020-03-16 DIAGNOSIS — M75.01 ADHESIVE CAPSULITIS OF RIGHT SHOULDER: Primary | ICD-10-CM

## 2020-03-16 PROCEDURE — 99213 OFFICE O/P EST LOW 20 MIN: CPT | Performed by: ORTHOPAEDIC SURGERY

## 2020-03-16 RX ORDER — METAXALONE 800 MG/1
800 TABLET ORAL 3 TIMES DAILY
Qty: 90 TABLET | Refills: 0 | OUTPATIENT
Start: 2020-03-16 | End: 2020-11-27

## 2020-03-16 NOTE — PROGRESS NOTES
[facility] [place] [facility] Note    Subjective     Chief Complaint   Patient presents with   • Right Shoulder - Follow-up     Adhesive capsulitis of right shoulder            HPI  Eliz Velazquez is a 59 y.o. female.  She is doing a bit better.  She has only been to physical therapy once.  She would like a muscle relaxer.  Pain is 8 out of 10.    No past medical history on file.   Past Surgical History:   Procedure Laterality Date   • HYSTERECTOMY        Family History   Problem Relation Age of Onset   • No Known Problems Mother    • No Known Problems Father      Social History     Socioeconomic History   • Marital status:      Spouse name: Not on file   • Number of children: Not on file   • Years of education: Not on file   • Highest education level: Not on file   Tobacco Use   • Smoking status: Never Smoker   • Smokeless tobacco: Never Used   Substance and Sexual Activity   • Alcohol use: No   • Drug use: No   • Sexual activity: Defer      Current Outpatient Medications on File Prior to Visit   Medication Sig Dispense Refill   • albuterol sulfate  (90 Base) MCG/ACT inhaler Inhale 2 puffs Every 6 (Six) Hours As Needed for Wheezing. 8 g 0   • benzonatate (TESSALON) 100 MG capsule Take 1 capsule by mouth 3 (Three) Times a Day As Needed for Cough. 12 capsule 0   • hepatitis A (HAVRIX) 1440 EL U/ML vaccine Inject 1 mL into the appropriate muscle as directed by prescriber. Repeat in 6 months 1 mL 1   • naproxen (NAPROSYN) 500 MG tablet Take 1 tablet by mouth 2 (Two) Times a Day. 180 tablet 3   • oseltamivir (TAMIFLU) 75 MG capsule Take 1 capsule by mouth 2 (Two) Times a Day. 10 capsule 0     No current facility-administered medications on file prior to visit.       No Known Allergies     The following portions of the patient's history were reviewed and updated as appropriate: allergies, current medications, past family history, past medical history, past social history, past surgical history and  "problem list.    Review of Systems   Constitutional: Negative.    HENT: Negative.    Eyes: Negative.    Respiratory: Negative.    Cardiovascular: Negative.    Gastrointestinal: Negative.    Endocrine: Negative.    Genitourinary: Negative.    Musculoskeletal: Positive for arthralgias.   Skin: Negative.    Allergic/Immunologic: Negative.    Neurological: Negative.    Hematological: Negative.    Psychiatric/Behavioral: Negative.         Objective      Physical Exam  Pulse 73   Ht 157.5 cm (62.01\")   Wt 106 kg (233 lb 11 oz)   SpO2 100%   BMI 42.73 kg/m²     Body mass index is 42.73 kg/m².    GENERAL APPEARANCE: awake, alert & oriented x 3, in no acute distress and well developed, well nourished  PSYCH: normal mood and affect  Right shoulder has improved motion and improving strength but still stiff.  She has barely 90 degrees of flexion abduction.  Imaging/Studies  Imaging Results (Last 7 Days)     ** No results found for the last 168 hours. **          Assessment/Plan        ICD-10-CM ICD-9-CM   1. Adhesive capsulitis of right shoulder M75.01 726.0     I have ordered Skelaxin.  More physical therapy and she will follow-up in 4 weeks.  Medical Decision Making  Management Options : prescription/IM medicine and physical/occupational therapy      Fausto Jennings MD  03/16/20  09:20         EMR Dragon/Transcription disclaimer:  Much of this encounter note is an electronic transcription of spoken language to printed text. Electronic transcription of spoken language may permit erroneous, or at times, nonsensical words or phrases to be inadvertently transcribed. Although I have reviewed the note for such errors, some may still exist.      "

## 2020-03-17 ENCOUNTER — HOSPITAL ENCOUNTER (OUTPATIENT)
Dept: PHYSICAL THERAPY | Facility: HOSPITAL | Age: 60
Setting detail: THERAPIES SERIES
Discharge: HOME | End: 2020-03-17

## 2020-03-17 DIAGNOSIS — M25.511 ACUTE PAIN OF RIGHT SHOULDER: ICD-10-CM

## 2020-03-17 DIAGNOSIS — M75.01 ADHESIVE CAPSULITIS OF RIGHT SHOULDER: Primary | ICD-10-CM

## 2020-03-17 PROCEDURE — 97110 THERAPEUTIC EXERCISES: CPT

## 2020-03-17 PROCEDURE — 97140 MANUAL THERAPY 1/> REGIONS: CPT

## 2020-03-17 NOTE — THERAPY TREATMENT NOTE
Outpatient Physical Therapy Ortho Treatment Note  [facility]     Patient Name: Eliz Velazquez  : 1960  MRN: [id number]  Today's Date: 3/17/2020      Visit Date: 2020    Visit Dx:    ICD-10-CM ICD-9-CM   1. Adhesive capsulitis of right shoulder M75.01 726.0   2. Acute pain of right shoulder M25.511 719.41      Past Surgical History:   Procedure Laterality Date   • HYSTERECTOMY       PT Assessment/Plan     Row Name 20 0830          PT Assessment    Assessment Comments  Overall range is improved significantly.  She demonstrated full elevation today with sharp pain at end range.  She has some signs of partial rotator cuff tear.  She tolerated exercises in the clinic well and noted some relief with ASTYM technique.  -MM        PT Plan    PT Plan Comments  Continue per plan of treatment with scapular stabilization and active range of motion exercises as able.  Also continue manual therapy.  -MM       User Key  (r) = Recorded By, (t) = Taken By, (c) = Cosigned By    Initials Name Provider Type    Chase Tobias, PT Physical Therapist            OP Exercises     Row Name 20 0830             Subjective Comments    Subjective Comments  Client reports no pain at rest, but continued discomfort with elevation. Pain with flexion is mild, but moderate when lowering.   -MM         Subjective Pain    Able to rate subjective pain?  yes  -MM      Pre-Treatment Pain Level  0  -MM      Post-Treatment Pain Level  0  -MM      Subjective Pain Comment  Up to 5/10 with overhead activity.  -MM         Total Minutes    19874 - PT Therapeutic Exercise Minutes  30  -MM      81037 - PT Manual Therapy Minutes  15  -MM         Exercise 1    Exercise Name 1  Updated and reviewed home program.  Exercises in clinical setting included: UB E, standing push-ups, rows with double yellow, yellow band resisted internal rotation, yellow band resisted external rotation, active range of motion external rotation at 90  degrees elevation, wall walks, active assisted wand flexion, side-lying small lever arm abduction, sleeper stretch, and pendulums.  -MM      Cueing 1  Verbal;Tactile  -MM      Time 1  30  -MM      Additional Comments  Therapeutic exercise  -MM        User Key  (r) = Recorded By, (t) = Taken By, (c) = Cosigned By    Initials Name Provider Type    Chase Tobias, PT Physical Therapist           Manual Rx (last 36 hours)      Manual Treatments     Row Name 03/17/20 0830             Total Minutes    19327 - PT Manual Therapy Minutes  15  -MM         Manual Rx 1    Manual Rx 1 Location  Right shoulder  -MM      Manual Rx 1 Type  Provided soft tissue mobilization using ASTYM technique.  Mild to moderate pressure was used with ASTYM technique.  -MM      Manual Rx 1 Duration  15  -MM        User Key  (r) = Recorded By, (t) = Taken By, (c) = Cosigned By    Initials Name Provider Type    Chase Tobias, PT Physical Therapist      Time Calculation:   Start Time: 0830  Therapy Charges for Today     Code Description Service Date Service Provider Modifiers Qty    72128033159  PT THER PROC EA 15 MIN 3/17/2020 Chase Mccormick, PT GP 2    [id number]  PT MANUAL THERAPY EA 15 MIN 3/17/2020 Chase Mccormick, PT GP 1      Chase Mccormick PT  3/17/2020

## 2020-06-01 PROCEDURE — U0003 INFECTIOUS AGENT DETECTION BY NUCLEIC ACID (DNA OR RNA); SEVERE ACUTE RESPIRATORY SYNDROME CORONAVIRUS 2 (SARS-COV-2) (CORONAVIRUS DISEASE [COVID-19]), AMPLIFIED PROBE TECHNIQUE, MAKING USE OF HIGH THROUGHPUT TECHNOLOGIES AS DESCRIBED BY CMS-2020-01-R: HCPCS | Performed by: NURSE PRACTITIONER

## 2020-06-04 ENCOUNTER — TELEPHONE (OUTPATIENT)
Dept: URGENT CARE | Facility: CLINIC | Age: 60
End: 2020-06-04

## 2020-06-04 NOTE — TELEPHONE ENCOUNTER
Pt notified of negative COVID results; per  CDC, pt told to stay quarantined for 7 days from onset of symptoms and be fever free for 72 hours without medications; pt needed results printed for PCP

## 2020-08-04 ENCOUNTER — DOCUMENTATION (OUTPATIENT)
Dept: PHYSICAL THERAPY | Facility: HOSPITAL | Age: 60
End: 2020-08-04

## 2020-08-04 DIAGNOSIS — M25.511 ACUTE PAIN OF RIGHT SHOULDER: ICD-10-CM

## 2020-08-04 DIAGNOSIS — M75.01 ADHESIVE CAPSULITIS OF RIGHT SHOULDER: Primary | ICD-10-CM

## 2020-08-04 NOTE — THERAPY DISCHARGE NOTE
Outpatient Physical Therapy Discharge Summary         Patient Name: Eliz Velazquez  : 1960  MRN: [id number]    Today's Date: 2020    Visit Dx:    ICD-10-CM ICD-9-CM   1. Adhesive capsulitis of right shoulder M75.01 726.0   2. Acute pain of right shoulder M25.511 719.41       PT OP Goals     Row Name 20 1047          PT Short Term Goals    STG Date to Achieve  20  -AC     STG 1  Decrease R shoulder pain by > or = 50%.  -AC     STG 1 Progress  Not Met  -AC     STG 2  Perform independent HEP to maximize strength/mobility and decrease pain.  -AC     STG 2 Progress  Not Met  -AC     STG 3  Improve R shoulder flexion AROM to at least 140 deg.  -AC     STG 3 Progress  Not Met  -AC        Long Term Goals    LTG Date to Achieve  20  -AC     LTG 1  Decrease R shoulder pain by > or = 75%.  -AC     LTG 1 Progress  Not Met  -AC     LTG 2  Improve QDASH to < or = 30%.  -AC     LTG 2 Progress  Not Met  -AC     LTG 3  Improve R shoulder strength to grossly 4/5.  -AC     LTG 3 Progress  Not Met  -AC     LTG 4  Enable ability to perform overhead activities, washing back, and crocheting with min-no difficulty/pain (< or = 3/10).  -AC     LTG 4 Progress  Not Met  -AC        Time Calculation    PT Goal Re-Cert Due Date  06/10/20  -AC       User Key  (r) = Recorded By, (t) = Taken By, (c) = Cosigned By    Initials Name Provider Type    AC Ariana James, PT Physical Therapist          OP PT Discharge Summary  Date of Discharge: 20  Reason for Discharge: Unable to participate  Outcomes Achieved: Unable to make functional progress toward goals at this time  Discharge Destination: Home with home program  Discharge Instructions/Additional Comments: Pt seen for IE and unable to complete further follow-ups d/t COVID-19 pandemic. D/c due to lapse in treatment.      Time Calculation:   Start Time:                 Ariana James, MAR  2020

## 2020-11-27 PROCEDURE — U0004 COV-19 TEST NON-CDC HGH THRU: HCPCS | Performed by: FAMILY MEDICINE

## 2020-11-28 ENCOUNTER — TELEPHONE (OUTPATIENT)
Dept: URGENT CARE | Facility: CLINIC | Age: 60
End: 2020-11-28

## 2020-12-18 ENCOUNTER — HOSPITAL ENCOUNTER (EMERGENCY)
Facility: HOSPITAL | Age: 60
Discharge: HOME | End: 2020-12-18
Attending: EMERGENCY MEDICINE | Admitting: EMERGENCY MEDICINE

## 2020-12-18 ENCOUNTER — APPOINTMENT (OUTPATIENT)
Dept: GENERAL RADIOLOGY | Facility: HOSPITAL | Age: 60
End: 2020-12-18

## 2020-12-18 VITALS
RESPIRATION RATE: 18 BRPM | WEIGHT: 220 LBS | HEART RATE: 80 BPM | DIASTOLIC BLOOD PRESSURE: 88 MMHG | HEIGHT: 62 IN | OXYGEN SATURATION: 96 % | BODY MASS INDEX: 40.48 KG/M2 | TEMPERATURE: 97.8 F | SYSTOLIC BLOOD PRESSURE: 158 MMHG

## 2020-12-18 DIAGNOSIS — M25.461 EFFUSION OF RIGHT KNEE: ICD-10-CM

## 2020-12-18 DIAGNOSIS — S83.91XA SPRAIN OF RIGHT KNEE, UNSPECIFIED LIGAMENT, INITIAL ENCOUNTER: Primary | ICD-10-CM

## 2020-12-18 PROCEDURE — 63710000001 ONDANSETRON PER 8 MG: Performed by: PHYSICIAN ASSISTANT

## 2020-12-18 PROCEDURE — 99284 EMERGENCY DEPT VISIT MOD MDM: CPT

## 2020-12-18 PROCEDURE — 73562 X-RAY EXAM OF KNEE 3: CPT

## 2020-12-18 PROCEDURE — 73560 X-RAY EXAM OF KNEE 1 OR 2: CPT

## 2020-12-18 RX ORDER — ONDANSETRON 4 MG/1
4 TABLET, FILM COATED ORAL EVERY 6 HOURS PRN
Status: DISCONTINUED | OUTPATIENT
Start: 2020-12-18 | End: 2020-12-18 | Stop reason: HOSPADM

## 2020-12-18 RX ORDER — TRAMADOL HYDROCHLORIDE 50 MG/1
50 TABLET ORAL EVERY 6 HOURS PRN
Qty: 12 TABLET | Refills: 0 | Status: SHIPPED | OUTPATIENT
Start: 2020-12-18 | End: 2022-04-12

## 2020-12-18 RX ORDER — HYDROCODONE BITARTRATE AND ACETAMINOPHEN 7.5; 325 MG/1; MG/1
1 TABLET ORAL ONCE
Status: COMPLETED | OUTPATIENT
Start: 2020-12-18 | End: 2020-12-18

## 2020-12-18 RX ADMIN — ONDANSETRON HYDROCHLORIDE 4 MG: 4 TABLET, FILM COATED ORAL at 12:48

## 2020-12-18 RX ADMIN — HYDROCODONE BITARTRATE AND ACETAMINOPHEN 1 TABLET: 7.5; 325 TABLET ORAL at 12:48

## 2020-12-18 NOTE — DISCHARGE INSTRUCTIONS
Cold compresses today every few hours for 20 minutes.  Warm compresses tomorrow in a similar fashion.  Use crutches to assist with ambulation.  Ace wrap as needed for comfort.  Follow-up with Dr. Priest for orthopedic evaluation, call Monday morning to schedule visit.  Return to the emergency department immediately if any change or worsening of symptoms.

## 2020-12-18 NOTE — ED PROVIDER NOTES
EMERGENCY DEPARTMENT ENCOUNTER    Room Number:  TRI1/T1  Date of encounter:  12/18/2020  PCP: Abdias Pitt MD  Historian: pt.      HPI:  Chief Complaint: Right knee sprain-contusion        Context: Eliz Velazquez is a 60 y.o. female who presents to the ED c/o right knee pain and swelling after a fall down 2 steps yesterday evening.  She has swelling and pain with ambulation.  Denies other symptoms or injuries.      PAST MEDICAL HISTORY  Active Ambulatory Problems     Diagnosis Date Noted   • No Active Ambulatory Problems     Resolved Ambulatory Problems     Diagnosis Date Noted   • No Resolved Ambulatory Problems     No Additional Past Medical History         PAST SURGICAL HISTORY  Past Surgical History:   Procedure Laterality Date   • HYSTERECTOMY           FAMILY HISTORY  Family History   Problem Relation Age of Onset   • No Known Problems Mother    • No Known Problems Father          SOCIAL HISTORY  Social History     Socioeconomic History   • Marital status:      Spouse name: Not on file   • Number of children: Not on file   • Years of education: Not on file   • Highest education level: Not on file   Tobacco Use   • Smoking status: Never Smoker   • Smokeless tobacco: Never Used   Substance and Sexual Activity   • Alcohol use: No   • Drug use: No   • Sexual activity: Defer         ALLERGIES  Patient has no known allergies.        REVIEW OF SYSTEMS  Review of Systems     All systems reviewed and negative except for those discussed in HPI.       PHYSICAL EXAM    I have reviewed the triage vital signs and nursing notes.    ED Triage Vitals [12/18/20 1124]   Temp Heart Rate Resp BP SpO2   97.8 °F (36.6 °C) 83 18 (!) 142/107 96 %      Temp src Heart Rate Source Patient Position BP Location FiO2 (%)   Infrared Monitor Sitting Right arm --       Physical Exam  GENERAL:   Nontoxic appearing, hemodynamically stable..  HENT: Nares patent.  EYES: No scleral icterus.  CV: Regular rhythm, regular  rate.  RESPIRATORY: Normal effort.  No audible wheezes, rales or rhonchi.  ABDOMEN: Soft, nontender.  MUSCULOSKELETAL: Small effusion noted the right knee with tenderness to patella and medial/lateral joint lines.  No popliteal swelling or tenderness.  Range of motion minimal due to pain level and guarding.  Neurovascular status is intact distally.  No obvious other deformities.  NEURO: Alert, moves all extremities, follows commands.  SKIN: Warm, dry, no rash visualized.        LAB RESULTS  No results found for this or any previous visit (from the past 24 hour(s)).    If labs were ordered, I independently reviewed the results.        RADIOLOGY  Xr Knee 3 View Right    Addendum Date: 12/18/2020    Addendum: 3 views were obtained, with sunrise view submitted after the report was dictated. [place] view demonstrates some minimal lateral patellar subluxation and arthrosis changes.  This report was finalized on 12/18/2020 1:34 PM by Tl Wolff.      Result Date: 12/18/2020  EXAMINATION: XR KNEE 1 OR 2 VW RIGHT-  INDICATION: FALL- RIGHT KNEE PAIN  COMPARISON: NONE  FINDINGS: Tricompartmental arthrosis changes are evident narrowing and osteophytosis. A moderate to large suprapatellar joint effusion is noted. There is otherwise no evidence of acute fracture or dislocation.      Tricompartmental arthrosis changes are evident narrowing and osteophytosis. A moderate to large suprapatellar joint effusion is noted. There is otherwise no evidence of acute fracture or dislocation.  This report was finalized on 12/18/2020 12:41 PM by Tl Wolff.            PROCEDURES    Procedures    No orders to display       MEDICATIONS GIVEN IN ER    Medications   ondansetron (ZOFRAN) tablet 4 mg (4 mg Oral Given 12/18/20 1248)   HYDROcodone-acetaminophen (NORCO) 7.5-325 MG per tablet 1 tablet (1 tablet Oral Given 12/18/20 1248)         PROGRESS, DATA ANALYSIS, CONSULTS, AND MEDICAL DECISION MAKING    All labs have been independently  reviewed by me.  All radiology studies have been reviewed by me and the radiologist dictating the report.   EKG's have been independently viewed and interpreted by me.          ED Course as of Dec 18 1831   Fri Dec 18, 2020   1346 I reviewed the imaging studies with 3 views of the right knee showing degenerative changes with effusion.  Otherwise no acute fracture or dislocation.  See report of radiology for details.   XR Knee 3 View Right [RS]      ED Course User Index  [RS] Cem Aiken MD             AS OF 18:31 EST VITALS:    BP - 158/88  HR - 80  TEMP - 97.8 °F (36.6 °C) (Infrared)  O2 SATS - 96%        DIAGNOSIS  Final diagnoses:   Sprain of right knee, unspecified ligament, initial encounter   Effusion of right knee         DISPOSITION  DISCHARGE    Patient discharged in stable condition.    Reviewed implications of results, diagnosis, meds, responsibility to follow up, warning signs and symptoms of possible worsening, potential complications and reasons to return to ER.    Patient/Family voiced understanding of above instructions.    Discussed plan for discharge, as there is no emergent indication for admission.  Pt/family is agreeable and understands need for follow up and possible repeat testing.  Pt/family is aware that discharge does not mean that nothing is wrong but that it indicates no emergency is currently present that requires admission and they must continue care with follow-up as given below or with a physician of their choice.     FOLLOW-UP  Abdias Pitt MD  [address] [facility] [address] [phone]    In 1 week      Todd Priest MD  [address]    [facility] [address] [phone]    Schedule an appointment as soon as possible for a visit            Medication List      New Prescriptions    traMADol 50 MG tablet  Commonly known as: ULTRAM  Take 1 tablet by mouth Every 6 (Six) Hours As Needed for Severe Pain .           Where to Get Your Medications      These  medications were sent to [facility] [address] [place] [address] - [address] - [phone]  - [phone]   [address] [place] [address]    Phone: [phone]   · traMADol 50 MG tablet                  Marcio Kan PA-C  12/18/20 0881

## 2020-12-21 ENCOUNTER — IMMUNIZATION (OUTPATIENT)
Dept: VACCINE CLINIC | Facility: HOSPITAL | Age: 60
End: 2020-12-21

## 2020-12-21 PROCEDURE — 91300 HC SARSCOV02 VAC 30MCG/0.3ML IM: CPT | Performed by: INTERNAL MEDICINE

## 2020-12-21 PROCEDURE — 0001A: CPT | Performed by: INTERNAL MEDICINE

## 2021-01-11 ENCOUNTER — IMMUNIZATION (OUTPATIENT)
Dept: VACCINE CLINIC | Facility: HOSPITAL | Age: 61
End: 2021-01-11

## 2021-01-11 PROCEDURE — 0002A: CPT | Performed by: INTERNAL MEDICINE

## 2021-01-11 PROCEDURE — 0001A: CPT | Performed by: INTERNAL MEDICINE

## 2021-01-11 PROCEDURE — 91300 HC SARSCOV02 VAC 30MCG/0.3ML IM: CPT | Performed by: INTERNAL MEDICINE

## 2021-10-08 ENCOUNTER — IMMUNIZATION (OUTPATIENT)
Dept: VACCINE CLINIC | Facility: HOSPITAL | Age: 61
End: 2021-10-08

## 2021-10-08 PROCEDURE — 91300 HC SARSCOV02 VAC 30MCG/0.3ML IM: CPT | Performed by: INTERNAL MEDICINE

## 2021-10-08 PROCEDURE — 0004A ADM SARSCOV2 30MCG/0.3ML BOOSTER: CPT | Performed by: INTERNAL MEDICINE

## 2021-10-08 PROCEDURE — 0003A: CPT | Performed by: INTERNAL MEDICINE

## 2022-03-10 ENCOUNTER — NURSE TRIAGE (OUTPATIENT)
Dept: CALL CENTER | Facility: HOSPITAL | Age: 62
End: 2022-03-10

## 2022-03-10 NOTE — TELEPHONE ENCOUNTER
"HUB    Able to transfer call to office for new patient appointment    Reason for Disposition  • Leg pain or muscle cramp is a chronic symptom (recurrent or ongoing AND present > 4 weeks)  • Difficulty swallowing is a chronic symptom (recurrent or ongoing AND present > 4 weeks)    Additional Information  • Negative: Looks like a broken bone or dislocated joint (e.g., crooked or deformed)  • Negative: Sounds like a life-threatening emergency to the triager  • Negative: Followed a leg injury  • Negative: Leg swelling is main symptom  • Negative: Back pain radiating (shooting) into leg(s)  • Negative: Knee pain is main symptom  • Negative: Ankle pain is main symptom  • Negative: Pregnant  • Negative: Postpartum (from 0 to 6 weeks after delivery)  • Negative: Chest pain  • Negative: Difficulty breathing  • Negative: Entire foot is cool or blue in comparison to other side  • Negative: Unable to walk  • Negative: [1] Red area or streak AND [2] fever  • Negative: [1] Swollen joint AND [2] fever  • Negative: [1] Cast on leg or ankle AND [2] now increased pain  • Negative: Patient sounds very sick or weak to the triager  • Negative: [1] SEVERE pain (e.g., excruciating, unable to do any normal activities) AND [2] not improved after 2 hours of pain medicine  • Negative: [1] Thigh or calf pain AND [2] only 1 side AND [3] present > 1 hour (Exception: chronic unchanged pain)  • Negative: [1] Thigh, calf, or ankle swelling AND [2] only 1 side  • Negative: [1] Thigh, calf, or ankle swelling AND [2] bilateral AND [3] 1 side is more swollen  • Negative: [1] Red area or streak AND [2] large (> 2 in. or 5 cm)  • Negative: History of prior \"blood clot\" in leg or lungs (i.e., deep vein thrombosis, pulmonary embolism)  • Negative: History of inherited increased risk of blood clots (e.g., Factor 5 Leiden, Anti-thrombin 3, Protein C or Protein S deficiency, Prothrombin mutation)  • Negative: Major surgery in past month  • Negative: Hip or " "leg fracture (broken bone) in past month (or had cast on leg or ankle in past month)  • Negative: Illness requiring prolonged bedrest in past month (e.g., immobilization, long hospital stay)  • Negative: Long-distance travel in past month (e.g., car, bus, train, plane; with trip lasting 6 or more hours)  • Negative: Cancer treatment in past six months (or has cancer now)  • Negative: [1] Painful rash AND [2] multiple small blisters grouped together (i.e., dermatomal distribution or \"band\" or \"stripe\")  • Negative: Looks like a boil, infected sore, deep ulcer or other infected rash (spreading redness, pus)  • Negative: [1] Localized rash is very painful AND [2] no fever  • Negative: Numbness in a leg or foot (i.e., loss of sensation)  • Negative: Localized pain, redness or hard lump along vein  • Negative: [1] MODERATE pain (e.g., interferes with normal activities, limping) AND [2] present > 3 days  • Negative: [1] Swollen joint AND [2] no fever or redness  • Negative: [1] Leg pain which occurs after walking a certain distance AND [2] disappears with rest AND [3] age > 50  • Negative: [1] Pain in front of the lower leg(s) (shins)     AND [2] occurs with running or jumping exercise  (e.g., jogging,  basketball)  • Negative: [1] MILD pain (e.g., does not interfere with normal activities) AND [2] present > 7 days  • Negative: [1] Severe difficulty swallowing (e.g., drooling or spitting) AND [2] started suddenly after taking a medicine or allergic food  • Negative: Wheezing, stridor, hoarseness, or difficulty breathing  • Negative: [1] Swollen tongue AND [2] sudden onset  • Negative: Sounds like a life-threatening emergency to the triager  • Negative: Mouth ulcers are seen  • Negative: Sore throat (throat pain with swallowing)  • Negative: Swallowed a (non-edible) foreign body  • Negative: Feeding tube, questions or concerns related to  • Negative: Swelling of tongue  • Negative: SEVERE difficulty swallowing (e.g., " "drooling or spitting, can't swallow water)  • Negative: [1] Symptoms of blocked esophagus (e.g., can't swallow normal secretions, drooling) AND [2] present now  • Negative: Symptoms of food or bone stuck in throat or esophagus (e.g., pain in throat or chest, FB sensation, blood-tinged saliva)  • Negative: SEVERE symptoms of pill stuck in throat or esophagus (e.g., severe pain, bleeding, or inability to swallow liquids)  • Negative: [1] Drinking very little AND [2] dehydration suspected (e.g., no urine > 12 hours, very dry mouth, very lightheaded)  • Negative: [1] Refuses to drink anything AND [2] for > 12 hours  • Negative: Patient sounds very sick or weak to the triager  • Negative: Fever > 100.4 F (38.0 C)  • Negative: [1] Coughing spells AND [2] occur during eating/feedings or within 2 hours  • Negative: [1] Symptoms of pill stuck in throat or esophagus (e.g., pain in throat or chest, FB sensation) AND [2] no relief after using CARE ADVICE  • Negative: Weak immune system (e.g., HIV positive, cancer chemo, splenectomy, organ transplant, chronic steroids)  • Negative: [1] Swallowing difficulty AND [2] cause unknown (Exception: difficulty swallowing is a chronic symptom)    Answer Assessment - Initial Assessment Questions  1. ONSET: \"When did the pain start?\"       Three months ago  2. LOCATION: \"Where is the pain located?\"       Back of calves; both legs; R>L  3. PAIN: \"How bad is the pain?\"    (Scale 1-10; or mild, moderate, severe)    -  MILD (1-3): doesn't interfere with normal activities     -  MODERATE (4-7): interferes with normal activities (e.g., work or school) or awakens from sleep, limping     -  SEVERE (8-10): excruciating pain, unable to do any normal activities, unable to walk      Mild to moderate  4. WORK OR EXERCISE: \"Has there been any recent work or exercise that involved this part of the body?\"       denies  5. CAUSE: \"What do you think is causing the leg pain?\"      unknown  6. OTHER SYMPTOMS: " "\"Do you have any other symptoms?\" (e.g., chest pain, back pain, breathing difficulty, swelling, rash, fever, numbness, weakness)      Swelling of legs which is not knew  7. PREGNANCY: \"Is there any chance you are pregnant?\" \"When was your last menstrual period?\"      na    Answer Assessment - Initial Assessment Questions  1. SYMPTOM: \"Are you having difficulty swallowing liquids, solids, or both?\"      Saliva only; states no problem eating or drinking  2. ONSET: \"When did the swallowing problems begin?\"       Months ago  3. CAUSE: \"What do you think is causing the problem?\"       unknown  4. CHRONIC/RECURRENT: \"Is this a new problem for you?\"  If no, ask: \"How long have you had this problem?\" (e.g., days, weeks, months)       Off and on for months  5. OTHER SYMPTOMS: \"Do you have any other symptoms?\" (e.g., difficulty breathing, sore throat, swollen tongue, chest pain)      denies  6. PREGNANCY: \"Is there any chance you are pregnant?\" \"When was your last menstrual period?\"      na    Protocols used: LEG PAIN-ADULT-AH, SWALLOWING DIFFICULTY-ADULT-AH      "

## 2022-04-12 ENCOUNTER — HOSPITAL ENCOUNTER (OUTPATIENT)
Dept: GENERAL RADIOLOGY | Facility: HOSPITAL | Age: 62
Discharge: HOME | End: 2022-04-12

## 2022-04-12 ENCOUNTER — OFFICE VISIT (OUTPATIENT)
Dept: FAMILY MEDICINE CLINIC | Facility: CLINIC | Age: 62
End: 2022-04-12

## 2022-04-12 ENCOUNTER — LAB (OUTPATIENT)
Dept: LAB | Facility: HOSPITAL | Age: 62
End: 2022-04-12

## 2022-04-12 VITALS
SYSTOLIC BLOOD PRESSURE: 126 MMHG | HEIGHT: 62 IN | WEIGHT: 228 LBS | HEART RATE: 87 BPM | BODY MASS INDEX: 41.96 KG/M2 | DIASTOLIC BLOOD PRESSURE: 72 MMHG | OXYGEN SATURATION: 97 %

## 2022-04-12 DIAGNOSIS — R13.10 DYSPHAGIA, UNSPECIFIED TYPE: Primary | ICD-10-CM

## 2022-04-12 DIAGNOSIS — M54.50 CHRONIC LEFT-SIDED LOW BACK PAIN WITHOUT SCIATICA: ICD-10-CM

## 2022-04-12 DIAGNOSIS — E04.9 GOITER: ICD-10-CM

## 2022-04-12 DIAGNOSIS — G89.29 CHRONIC LEFT-SIDED LOW BACK PAIN WITHOUT SCIATICA: ICD-10-CM

## 2022-04-12 LAB
T4 FREE SERPL-MCNC: 1.1 NG/DL (ref 0.93–1.7)
TSH SERPL DL<=0.05 MIU/L-ACNC: 2.23 UIU/ML (ref 0.27–4.2)

## 2022-04-12 PROCEDURE — 72082 X-RAY EXAM ENTIRE SPI 2/3 VW: CPT

## 2022-04-12 PROCEDURE — 99214 OFFICE O/P EST MOD 30 MIN: CPT | Performed by: FAMILY MEDICINE

## 2022-04-12 PROCEDURE — 84443 ASSAY THYROID STIM HORMONE: CPT

## 2022-04-12 PROCEDURE — 84439 ASSAY OF FREE THYROXINE: CPT

## 2022-04-12 NOTE — PROGRESS NOTES
"Chief Complaint  Difficulty Swallowing (Pt states sx has been going on for over a year.Pt states has to stand up sometimes to swallow.) and Neck Pain (Left side. Pt states sx started a year ago. Pt states a nerve in her neck makes her neck tense up. Pt states is happening more frequently)    Yosvany Velazquez presents to [facility]  History of Present Illness     She is changing PCP because of insurance change.     She had prior hysterectomy for prolonged menses and low iron.     Swallowing difficulty for over a year. While asleep she chokes on drainage and has to get up and gargle. Her throat feels glued together. She has salvia that she can't swallow. Tries not to eat bread and starches. She has pain on left side of neck nerve cramping. She has to stretch her neck. Opening her jaw with mouth exercises it tenses up more. Left side low back pain feels lumpy and she has back pain.        Objective   Vital Signs:   /72   Pulse 87   Ht 157.7 cm (62.09\")   Wt 103 kg (228 lb)   SpO2 97%   BMI 41.58 kg/m²     BMI is above normal parameters. Recommendations: educational material discussed/shared in after visit summary       Physical Exam  Vitals reviewed.   Constitutional:       General: She is not in acute distress.     Appearance: She is not ill-appearing.   HENT:      Mouth/Throat:      Mouth: No oral lesions.      Pharynx: No pharyngeal swelling, oropharyngeal exudate or posterior oropharyngeal erythema.      Tonsils: No tonsillar abscesses.   Neck:      Thyroid: Thyromegaly present. No thyroid tenderness.   Cardiovascular:      Rate and Rhythm: Normal rate and regular rhythm.   Pulmonary:      Effort: Pulmonary effort is normal.      Breath sounds: Normal breath sounds.   Musculoskeletal:      Thoracic back: No bony tenderness.      Lumbar back: No bony tenderness.        Back:    Lymphadenopathy:      Head:      Right side of head: No submandibular, " tonsillar, preauricular or posterior auricular adenopathy.      Left side of head: No submandibular, tonsillar, preauricular or posterior auricular adenopathy.      Cervical: No cervical adenopathy.      Right cervical: No superficial, deep or posterior cervical adenopathy.     Left cervical: No superficial, deep or posterior cervical adenopathy.   Neurological:      Mental Status: She is alert.   Psychiatric:         Mood and Affect: Mood normal.        Result Review :                 Assessment and Plan    Diagnoses and all orders for this visit:    1. Dysphagia, unspecified type (Primary)  -     FL Video Swallow With Speech Single Contrast; Future  -     COVID PRE-OP / PRE-PROCEDURE SCREENING ORDER (NO ISOLATION) - Swab, Nasopharynx; Future  New.  Needs further evaluation with swallow study.  Preprocedural Covid testing also ordered.  Reassess after results are available.  Recommend to continue to avoid bread and starchy foods along with chewing meats carefully.  2. Goiter  -     US Thyroid; Future  -     TSH; Future  -     T4, free; Future  New.  Recommend thyroid labs today and will also schedule ultrasound.  3. Chronic left-sided low back pain without sciatica  -     XR Spine Scoliosis 2 or 3 Views; Future  New.  Further evaluation with x-rays and concerned asymmetry could be a mild form of scoliosis.    Prior PCP records requested.      Follow Up   Return in about 1 month (around 5/12/2022) for Physical and fasting labs.  Patient was given instructions and counseling regarding her condition or for health maintenance advice. Please see specific information pulled into the AVS if appropriate.     Electronically signed by Sunitha Hunt MD, 04/12/22, 10:46 AM EDT.

## 2022-04-17 ENCOUNTER — LAB (OUTPATIENT)
Dept: PREADMISSION TESTING | Facility: HOSPITAL | Age: 62
End: 2022-04-17

## 2022-04-17 DIAGNOSIS — R13.10 DYSPHAGIA, UNSPECIFIED TYPE: ICD-10-CM

## 2022-04-17 LAB — SARS-COV-2 RNA PNL SPEC NAA+PROBE: NOT DETECTED

## 2022-04-17 PROCEDURE — U0004 COV-19 TEST NON-CDC HGH THRU: HCPCS

## 2022-04-17 PROCEDURE — C9803 HOPD COVID-19 SPEC COLLECT: HCPCS

## 2022-04-19 ENCOUNTER — TELEPHONE (OUTPATIENT)
Dept: FAMILY MEDICINE CLINIC | Facility: CLINIC | Age: 62
End: 2022-04-19

## 2022-04-20 ENCOUNTER — HOSPITAL ENCOUNTER (OUTPATIENT)
Dept: GENERAL RADIOLOGY | Facility: HOSPITAL | Age: 62
Discharge: HOME | End: 2022-04-20
Admitting: FAMILY MEDICINE

## 2022-04-20 DIAGNOSIS — R13.10 DYSPHAGIA, UNSPECIFIED TYPE: ICD-10-CM

## 2022-04-20 PROCEDURE — 92611 MOTION FLUOROSCOPY/SWALLOW: CPT

## 2022-04-20 PROCEDURE — 74230 X-RAY XM SWLNG FUNCJ C+: CPT

## 2022-04-20 RX ADMIN — BARIUM SULFATE 100 ML: 0.81 POWDER, FOR SUSPENSION ORAL at 10:41

## 2022-04-20 RX ADMIN — BARIUM SULFATE 20 ML: 400 PASTE ORAL at 10:41

## 2022-04-20 NOTE — MBS/VFSS/FEES
Outpatient Speech Language Pathology   Adult Swallow Initial Evaluation  [facility]   Modified Barium Swallow Study (MBS)     Patient Name: Eliz Velazquez  : 1960  MRN: [id number]  Today's Date: 2022         Visit Date: 2022   There is no problem list on file for this patient.       Past Medical History:   Diagnosis Date   • Iron deficiency anemia secondary to blood loss (chronic)     heavy menses        Past Surgical History:   Procedure Laterality Date   • TOTAL ABDOMINAL HYSTERECTOMY WITH SALPINGO OOPHORECTOMY     • TUBAL ABDOMINAL LIGATION           Visit Dx:     ICD-10-CM ICD-9-CM   1. Dysphagia, unspecified type  R13.10 787.20                SLP Adult Swallow Evaluation     Row Name 22 1015       Rehab Evaluation    Document Type evaluation  -RD    Subjective Information no complaints  -RD    Patient Observations alert;cooperative;agree to therapy  -RD    Patient/Family/Caregiver Comments/Observations none  -RD    Patient Effort good  -RD       General Information    Patient Profile Reviewed yes  -RD    Pertinent History Of Current Problem Pt presents for OP MBS 2' c/o difficulty swallowing ~ 1 year. Pt also complains of pain in L nerve of neck occasionally. Pt reports frequently waking up from sleep choking, excess drainage, & difficulty swallowing salive. Pt reports no significant swallowing difficulty when she is actually eating. PMH significant for chronic L back pain & goiter per chart.  -RD    Current Method of Nutrition regular textures;thin liquids  -RD    Precautions/Limitations, Vision WFL;for purposes of eval  -RD    Precautions/Limitations, Hearing WFL;for purposes of eval  -RD    Prior Level of Function-Communication WFL  -RD    Prior Level of Function-Swallowing no diet consistency restrictions;esophageal concerns  -RD    Plans/Goals Discussed with patient;agreed upon  -RD    Barriers to Rehab none identified  -RD    Patient's Goals for Discharge patient did not  state  -RD       Pain    Additional Documentation Pain Scale: Numbers Pre/Post-Treatment (Group)  -RD       Pain Scale: Numbers Pre/Post-Treatment    Pretreatment Pain Rating 0/10 - no pain  -RD    Posttreatment Pain Rating 0/10 - no pain  -RD       General Eating/Swallowing Observations    Respiratory Support Currently in Use room air  -RD       MBS/VFSS    Utensils Used spoon;cup;straw  -RD    Consistencies Trialed thin liquids;pudding thick;regular textures  -RD       MBS/VFSS Interpretation    Oral Phase, Comment Pt presents w/ functional oral phase of the swallow. Mastication was adequate for regular solids. Oral transit was WFL. No significant oral residue or weakness identified.  -RD       Initiation of Pharyngeal Swallow    Initiation of Pharyngeal Swallow bolus in pyriform sinuses  -RD    Pharyngeal Phase functional pharyngeal phase of swallowing  -RD    Rosenbek's Scale thin:;pudding/puree:;regular textures:;1--->level 1  -RD    Pharyngeal Phase, Comment Functional pharyngeal phase of the swallow. Swallow initiation was to the pyriform sinuses. No penetration or aspiration w/ any trial or consistency, even when pushed w/ multiple consecutive straw drinks of thin liquids. No significant pharyngeal residue or weakness identified.  -RD       Esophageal Phase    Esophageal Phase see radiology report for further details;other (see comments)  -RD    Esophageal Phase, Comment no immediate retrograde flow observed. Given pt reports of globus sensation & choking on saliva when laying flat, suspect further esophageal dysphagia. Pt would benefit from further esophageal management per physician discretion. MD to order GI consult as indicated. education provided on general aspiration/reflux precautions & reflux handout provided.  -RD       SLP Evaluation Clinical Impression    SLP Swallowing Diagnosis functional oral phase;functional pharyngeal phase;other (see comments)  suspect esophageal dysphagia  -RD    Functional  Impact no impact on function  -RD    Rehab Potential/Prognosis, Swallowing good, to achieve stated therapy goals  -RD    Swallow Criteria for Skilled Therapeutic Interventions Met no problems identified which require skilled intervention  -RD       Recommendations    Therapy Frequency (Swallow) evaluation only  -RD    SLP Diet Recommendation regular textures;thin liquids  -RD    Recommended Precautions and Strategies upright posture during/after eating;general aspiration precautions;reflux precautions  -RD    Oral Care Recommendations Oral Care BID/PRN  -RD    SLP Rec. for Method of Medication Administration meds whole;with thin liquids;as tolerated  -RD    Monitor for Signs of Aspiration yes;notify SLP if any concerns  -RD    Anticipated Discharge Disposition (SLP) home  -RD    Demonstrates Need for Referral to Another Service gastroenterology;other (see comments)  physician to order if in agreement.  -RD          User Key  (r) = Recorded By, (t) = Taken By, (c) = Cosigned By    Initials Name Provider Type    Tala Moore MS CCC-SLP Speech and Language Pathologist                               OP SLP Education     Row Name 04/20/22 1640       Education    Barriers to Learning No barriers identified  -RD    Education Provided Described results of evaluation;Patient expressed understanding of evaluation;Patient participated in establishing goals and treatment plan;Patient demonstrated recommended strategies  -RD    Assessed Learning needs;Learning motivation;Learning preferences;Learning readiness  -RD    Learning Motivation Strong  -RD    Learning Method Explanation;Teach back;Written materials  provided handout for lifestyle modifications for reflux  -RD    Teaching Response Verbalized understanding  -RD          User Key  (r) = Recorded By, (t) = Taken By, (c) = Cosigned By    Initials Name Effective Dates    Tala Moore MS CCC-SLP 06/16/21 -                          Time Calculation:   SLP  Start Time: 1015  Untimed Charges  SLP Eval/Re-eval : ST Motion Fluoro Eval Swallow - 07527  [id number]-[id number] Motion Fluoro Eval Swallow Minutes: 55  Total Minutes  Untimed Charges Total Minutes: 55   Total Minutes: 55    Therapy Charges for Today     Code Description Service Date Service Provider Modifiers Qty    [id number]  ST MOTION FLUORO EVAL SWALLOW 4 4/20/2022 Tala Tucker, MS CCC-SLP GN 1        Patient was not wearing a face mask and did not exhibit coughing during this therapy encounter.  Procedure performed was aerosolizing, involved close contact (within 6 feet for at least 15 minutes or longer), and did not involve contact with infectious secretions or specimens.  Therapist used appropriate personal protective equipment including gloves, standard procedure mask and eye protection.  Appropriate PPE was worn during the entire therapy session.  Hand hygiene was completed before and after therapy session.            Tala Tucker MS CCC-SLP  4/20/2022

## 2022-04-21 ENCOUNTER — TELEPHONE (OUTPATIENT)
Dept: FAMILY MEDICINE CLINIC | Facility: CLINIC | Age: 62
End: 2022-04-21

## 2022-04-21 DIAGNOSIS — R13.10 DYSPHAGIA, UNSPECIFIED TYPE: Primary | ICD-10-CM

## 2022-04-21 NOTE — TELEPHONE ENCOUNTER
Contacted patient and relayed results. She verbalized understanding and agreed to wait for EGD appt.

## 2022-04-21 NOTE — TELEPHONE ENCOUNTER
Swallow study suggested esophageal problem.  I recommend further evaluation with an endoscopy (EGD).  I will place referral.

## 2022-04-26 DIAGNOSIS — Z20.822 ENCOUNTER FOR PREPROCEDURE SCREENING LABORATORY TESTING FOR COVID-19: Primary | ICD-10-CM

## 2022-04-26 DIAGNOSIS — Z01.812 ENCOUNTER FOR PREPROCEDURE SCREENING LABORATORY TESTING FOR COVID-19: Primary | ICD-10-CM

## 2022-05-13 ENCOUNTER — LAB (OUTPATIENT)
Dept: LAB | Facility: HOSPITAL | Age: 62
End: 2022-05-13

## 2022-05-13 ENCOUNTER — OFFICE VISIT (OUTPATIENT)
Dept: FAMILY MEDICINE CLINIC | Facility: CLINIC | Age: 62
End: 2022-05-13

## 2022-05-13 VITALS
SYSTOLIC BLOOD PRESSURE: 143 MMHG | DIASTOLIC BLOOD PRESSURE: 81 MMHG | TEMPERATURE: 98.3 F | WEIGHT: 228 LBS | HEART RATE: 69 BPM | OXYGEN SATURATION: 97 % | HEIGHT: 62 IN | BODY MASS INDEX: 41.96 KG/M2 | RESPIRATION RATE: 18 BRPM

## 2022-05-13 DIAGNOSIS — Z00.00 WELL ADULT EXAM: ICD-10-CM

## 2022-05-13 DIAGNOSIS — E66.01 MORBID OBESITY: ICD-10-CM

## 2022-05-13 DIAGNOSIS — J30.2 SEASONAL ALLERGIES: ICD-10-CM

## 2022-05-13 DIAGNOSIS — I10 PRIMARY HYPERTENSION: ICD-10-CM

## 2022-05-13 DIAGNOSIS — L98.9 SKIN LESION: ICD-10-CM

## 2022-05-13 DIAGNOSIS — Z12.31 VISIT FOR SCREENING MAMMOGRAM: ICD-10-CM

## 2022-05-13 DIAGNOSIS — Z23 NEED FOR VACCINATION: ICD-10-CM

## 2022-05-13 DIAGNOSIS — Z00.00 WELL ADULT EXAM: Primary | ICD-10-CM

## 2022-05-13 DIAGNOSIS — Z12.11 SCREENING FOR MALIGNANT NEOPLASM OF COLON: ICD-10-CM

## 2022-05-13 DIAGNOSIS — Z12.83 SKIN EXAM, SCREENING FOR CANCER: ICD-10-CM

## 2022-05-13 LAB
ALBUMIN SERPL-MCNC: 4.1 G/DL (ref 3.5–5.2)
ALBUMIN/GLOB SERPL: 1.4 G/DL
ALP SERPL-CCNC: 127 U/L (ref 39–117)
ALT SERPL W P-5'-P-CCNC: 45 U/L (ref 1–33)
ANION GAP SERPL CALCULATED.3IONS-SCNC: 9 MMOL/L (ref 5–15)
AST SERPL-CCNC: 25 U/L (ref 1–32)
BILIRUB SERPL-MCNC: 0.5 MG/DL (ref 0–1.2)
BUN SERPL-MCNC: 14 MG/DL (ref 8–23)
BUN/CREAT SERPL: 27.5 (ref 7–25)
CALCIUM SPEC-SCNC: 9.2 MG/DL (ref 8.6–10.5)
CHLORIDE SERPL-SCNC: 102 MMOL/L (ref 98–107)
CHOLEST SERPL-MCNC: 193 MG/DL (ref 0–200)
CO2 SERPL-SCNC: 28 MMOL/L (ref 22–29)
CREAT SERPL-MCNC: 0.51 MG/DL (ref 0.57–1)
DEPRECATED RDW RBC AUTO: 38.3 FL (ref 37–54)
EGFRCR SERPLBLD CKD-EPI 2021: 105.7 ML/MIN/1.73
ERYTHROCYTE [DISTWIDTH] IN BLOOD BY AUTOMATED COUNT: 11.7 % (ref 12.3–15.4)
GLOBULIN UR ELPH-MCNC: 2.9 GM/DL
GLUCOSE SERPL-MCNC: 248 MG/DL (ref 65–99)
HCT VFR BLD AUTO: 43.1 % (ref 34–46.6)
HDLC SERPL-MCNC: 34 MG/DL (ref 40–60)
HGB BLD-MCNC: 14 G/DL (ref 12–15.9)
LDLC SERPL CALC-MCNC: 135 MG/DL (ref 0–100)
LDLC/HDLC SERPL: 3.91 {RATIO}
MCH RBC QN AUTO: 29.4 PG (ref 26.6–33)
MCHC RBC AUTO-ENTMCNC: 32.5 G/DL (ref 31.5–35.7)
MCV RBC AUTO: 90.4 FL (ref 79–97)
PLATELET # BLD AUTO: 211 10*3/MM3 (ref 140–450)
PMV BLD AUTO: 13 FL (ref 6–12)
POTASSIUM SERPL-SCNC: 4.7 MMOL/L (ref 3.5–5.2)
PROT SERPL-MCNC: 7 G/DL (ref 6–8.5)
RBC # BLD AUTO: 4.77 10*6/MM3 (ref 3.77–5.28)
SODIUM SERPL-SCNC: 139 MMOL/L (ref 136–145)
TRIGL SERPL-MCNC: 131 MG/DL (ref 0–150)
TSH SERPL DL<=0.05 MIU/L-ACNC: 1.79 UIU/ML (ref 0.27–4.2)
VLDLC SERPL-MCNC: 24 MG/DL (ref 5–40)
WBC NRBC COR # BLD: 5.46 10*3/MM3 (ref 3.4–10.8)

## 2022-05-13 PROCEDURE — 80050 GENERAL HEALTH PANEL: CPT

## 2022-05-13 PROCEDURE — 90472 IMMUNIZATION ADMIN EACH ADD: CPT | Performed by: FAMILY MEDICINE

## 2022-05-13 PROCEDURE — 90471 IMMUNIZATION ADMIN: CPT | Performed by: FAMILY MEDICINE

## 2022-05-13 PROCEDURE — 91305 COVID-19 (PFIZER) 12+ YRS: CPT | Performed by: FAMILY MEDICINE

## 2022-05-13 PROCEDURE — 0051A COVID-19 (PFIZER) 12+ YRS: CPT | Performed by: FAMILY MEDICINE

## 2022-05-13 PROCEDURE — 99396 PREV VISIT EST AGE 40-64: CPT | Performed by: FAMILY MEDICINE

## 2022-05-13 PROCEDURE — 36415 COLL VENOUS BLD VENIPUNCTURE: CPT

## 2022-05-13 PROCEDURE — 90715 TDAP VACCINE 7 YRS/> IM: CPT | Performed by: FAMILY MEDICINE

## 2022-05-13 PROCEDURE — 80061 LIPID PANEL: CPT

## 2022-05-13 PROCEDURE — 90632 HEPA VACCINE ADULT IM: CPT | Performed by: FAMILY MEDICINE

## 2022-05-13 RX ORDER — CETIRIZINE HYDROCHLORIDE 10 MG/1
10 TABLET ORAL DAILY
COMMUNITY

## 2022-05-13 NOTE — PATIENT INSTRUCTIONS
Recommend rechecking blood pressure. Goal 120/70.     Nutrition  Meet your nutrient needs primarily through nutrition by consuming foods and not just supplements  The Mediterranean diet and DASH (Dietary Approaches to Stop Hypertension) diet are proven diets to become healthier and lowering the risk of high blood pressure, some kinds of cancer, stroke, heart disease, heart failure, kidney stones, and diabetes. They are also effective at weight loss.  Macronutrient targets % total calories : Carbohydrates 50% (45-65%),  fat 30% (20-35%),  protein 20% (10-35%).   Emphasize vegetables, fruits, whole grains, nuts and seeds, beans and legumes, olive oil, and drink water. Small amounts of dairy, fish and seafood, and lean meat such as poultry. Occasional beef, pork, lamb, sweets and processed foods such as baked goods, chips, crackers, chocolate and candy.   Frozen vegetables and fruit are minimally processed , picked at its peak, convenient, and helps reduce food waste  Try low-sodium canned tomatoes, beans, and vegetables. You can also rinse in water to help remove some sodium.   Canned fruits packed in fruit juice is a better option than heavy syrup.   Recommend whole fruits over juice. Dried fruits are ok but have a higher sugar content.   Eat the rainbow. Vary your veggies with dark green, red and orange colors.   Make half your grains whole grains. Oatmeal brown rice, quinoa, farro, whole-grain pasta, whole-grain bread, and whole-grain tortillas.   Include a variety of protein sources such as lean meats, poultry, fish, seafood, beans, peas, nuts, seeds, eggs, soy   Move to low-fat or fat-free milk or yogurt. Limit cheese.   Other products sold as “milks” made from plants, such as rice, almond, coconut, and hemp “milks,” may contain calcium, but are not included in the dairy group because their overall nutrient content is not similar to dairy milk and fortified soy beverages   Use oils like canola, olive, and others  instead of solid fats (like butter and stick margarine, shortening, lard, and coconut oil)  Try grilling, broiling, roasting, or baking--they don't add extra fat  Added sugars include syrups and other sweeteners (brown sugar, corn sweetener, corn syrup, dextrose, fructose, glucose, high fructose corn syrup, honey, invert sugar, lactose, malt syrup, maltose, molasses, raw sugar, sucrose, trehalose, and turbinado sugar). When sugars are added to foods to rudolph them, they add calories without contributing essential nutrients  Cut calories by drinking water or unsweetened beverages. Soda, energy drinks, and sports drinks are a major source of added sugars  Water intake of 1.5L - 2L (50-70 ounces) per day  Daily fiber intake 20 g to 35 g per day  Soluble fiber pulls in water to slow solidify and slow loose, watery stools plus lower cholesterol. Examples are oats, peas, beans, apples, citrus fruits, carrots, barley and psyllium   Insoluble fiber is “roughage” to add bulk, make stool easier to pass and helps constipation. Examples are whole-wheat flour, wheat bran, nuts, beans and vegetables, such as cauliflower, green beans and potatoes.      Exercise    Less pain, better mood, and lower risk of many diseases  Some physical activity is better than none. Try to sit less throughout the day  Fitness is free--No equipment needed to walk, run/jog, dance, hike, Alex Chi  150 minutes (2 hours and 30 minutes) to 300 minutes (5 hours) a week of moderate-intensity aerobic physical activity, or 75 minutes (1 hour and 15 minutes) to 150 minutes (2 hours and 30 minutes) a week of vigorous-intensity aerobic physical activity has substantial health benefits  Muscle strengthening exercises 2 days per week  Older adults should incorporate balance training   Bones need pressure to get stronger. Weight bearing exercises include running/jogging, dancing, hiking, elliptical, treadmill, stair climbing, jumping rope, aerobics.   Joint friendly  low impact activities include walking, biking, swimming, yoga, Alex Chi, dance. Include range of motion and stretching exercises to improve flexibility.   https://health.gov/moveyourway

## 2022-05-13 NOTE — PROGRESS NOTES
"Chief Complaint  Annual Exam (Patient is fasting, no concerns )    Subjective          Eliz Velazquez presents to [facility] for   History of Present Illness    Allergies worse this morning. Watery eyes, runny nose, and scratchy throat. She takes an OTC allergy pill.     No current breast symptoms. Occasional burning sensation in breasts. No color change.     She had prior home test for colon cancer.     She tries not to eat bread. She has pancakes or muffin on Sunday. She doesn't really eat fried food. She eat vegetables, salads, chicken, fish. She doesn't really eat beef. She will only eat 1/2 of a hamburger at home. She loves rice. She has pasta and noodles with italian food.     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 Mole on chest is changed and crusty. She has skin tag that bother her on her neck and chest. Years of crusty lesion on right side of temple and roughness.     She has nerve pain on left side of neck.     She used to be on medication for high blood pressure in the past.     PHQ-2/PHQ-9 Depression Screening 5/13/2022   Little Interest or Pleasure in Doing Things 0-->not at all   Feeling Down, Depressed or Hopeless 0-->not at all   PHQ-9: Brief Depression Severity Measure Score 0       Objective   Vital Signs:   Vitals:    05/13/22 0911 05/13/22 0928   BP: 142/88 143/81   BP Location: Right arm    Patient Position: Sitting    Cuff Size: Adult    Pulse: 69    Resp: 18    Temp: 98.3 °F (36.8 °C)    TempSrc: Infrared    SpO2: 97%    Weight: 103 kg (228 lb)    Height: 157.7 cm (62.09\")    PainSc: 0-No pain      Body mass index is 41.58 kg/m².    Vitals:    05/13/22 0911 05/13/22 0928   BP: 142/88 143/81   BP Location: Right arm    Patient Position: Sitting    Cuff Size: Adult    Pulse: 69    Resp: 18    Temp: 98.3 °F (36.8 °C)    TempSrc: Infrared    SpO2: 97%    Weight: 103 kg (228 lb)    Height: 157.7 cm (62.09\")    PainSc: 0-No pain        Class 3 " Severe Obesity (BMI >=40). Obesity-related health conditions include the following: none. Obesity is unchanged. BMI is is above average; BMI management plan is completed. We discussed Nutrition and exercise handout.          Physical Exam  Constitutional:       General: She is not in acute distress.     Appearance: She is morbidly obese.   HENT:      Right Ear: Tympanic membrane and ear canal normal.      Left Ear: Tympanic membrane and ear canal normal.   Eyes:      General:         Right eye: No discharge.         Left eye: No discharge.      Conjunctiva/sclera: Conjunctivae normal.   Neck:      Thyroid: No thyromegaly.   Cardiovascular:      Rate and Rhythm: Normal rate and regular rhythm.   Pulmonary:      Effort: Pulmonary effort is normal.      Breath sounds: Normal breath sounds.   Abdominal:      Palpations: Abdomen is soft.      Tenderness: There is no abdominal tenderness.   Musculoskeletal:      Cervical back: Neck supple.   Lymphadenopathy:      Head:      Right side of head: No submandibular, preauricular or posterior auricular adenopathy.      Left side of head: No submandibular, preauricular or posterior auricular adenopathy.      Cervical: No cervical adenopathy.   Skin:     General: Skin is warm.      Comments: Skin tags on torso. Nevi.    Neurological:      Mental Status: She is alert and oriented to person, place, and time.   Psychiatric:         Mood and Affect: Mood normal.         Behavior: Behavior normal.         Thought Content: Thought content normal.         Judgment: Judgment normal.        Result Review :                Immunization History   Administered Date(s) Administered   • COVID-19 (PFIZER) PURPLE CAP 12/21/2020, 01/11/2021, 10/08/2021   • Covid-19 (Pfizer) Gray Cap 05/13/2022   • Hepatitis A 04/21/2019, 05/13/2022   • Tdap 05/13/2022       Health Maintenance   Topic Date Due   • MAMMOGRAM  Never done   • ZOSTER VACCINE (1 of 2) Never done   • INFLUENZA VACCINE  08/01/2022   •  TDAP/TD VACCINES (2 - Td or Tdap) 05/13/2032            Assessment and Plan    Diagnoses and all orders for this visit:    1. Well adult exam (Primary)  -     CBC (No Diff); Future  -     Comprehensive Metabolic Panel; Future  -     TSH Rfx On Abnormal To Free T4; Future  -     Lipid Panel; Future  Check fasting labs today.  2. Visit for screening mammogram  -     Mammo Screening Digital Tomosynthesis Bilateral With CAD; Future  Schedule mammogram.  3. Screening for malignant neoplasm of colon  -     Ambulatory Referral For Screening Colonoscopy  Patient has upcoming appoint with EGD, request to GI department if she could have colonoscopy performed same day so she does not have to take extra time off work.  4. Need for vaccination  -     Tdap Vaccine Greater Than or Equal To 8yo IM  -     Hepatitis A Vaccine Adult IM  -     COVID-19 Vaccine (Pfizer) Gray Cap  Updated immunizations in the office.  Recommend Shingrix vaccine through pharmacy.  5. Seasonal allergies  Continue over-the-counter antihistamine.  6. Skin lesion  -     Ambulatory Referral to Dermatology    7. Skin exam, screening for cancer  -     Ambulatory Referral to Dermatology  Recommend complete skin exam with dermatology.  8. Primary hypertension  Blood pressure elevated compared to previous visit.  She did report previous history of hypertension on medications that improved.  Asked her to check at home with a monitor or through work.  Goal blood pressure 120/70.  If her blood pressure readings return to normal no further action needed.  If blood pressure remains elevated discussed follow-up in clinic to discuss treatment options.  9. Morbid obesity (HCC)  Class 3 Severe Obesity (BMI >=40). Obesity-related health conditions include the following: none. Obesity is unchanged. BMI is is above average; BMI management plan is completed. We discussed Nutrition and exercise handout.      Counseled on health maintenance topics and preventative care  recommendations: Breast cancer screening, colon cancer screening, Shingrix vaccine, Tdap vaccine, hepatitis A vaccine, COVID-19 booster      Follow Up   Return in about 1 year (around 5/13/2023) for Physical and fasting labs.  Patient was given instructions and counseling regarding her condition or for health maintenance advice. Please see specific information pulled into the AVS if appropriate.      Electronically signed by Sunitha Hunt MD, 05/13/22, 10:00 AM EDT.

## 2022-05-16 ENCOUNTER — TELEPHONE (OUTPATIENT)
Dept: FAMILY MEDICINE CLINIC | Facility: CLINIC | Age: 62
End: 2022-05-16

## 2022-05-16 NOTE — TELEPHONE ENCOUNTER
Patient informed of lab results and attempted to schedule appt. She is going to work now and will check when her next day off. She is going to call the office back to schedule an appointment.

## 2022-05-16 NOTE — TELEPHONE ENCOUNTER
Please call patient to schedule an office visit for high cholesterol, blood pressure, and diabetes.  A letter will also be mailed with full results.    The test results show normal white blood cell count, no anemia, normal platelets.  Sugar is severely elevated and I am concerned about diabetes.  Please schedule an office visit that we may discuss the abnormal lab results and do further testing.  Other labs showed normal kidney function, electrolytes, liver function, and thyroid function.  Cholesterol levels show elevated LDL bad cholesterol.  Need to discuss treatment options for high cholesterol at your visit as well.

## 2022-05-20 PROCEDURE — U0004 COV-19 TEST NON-CDC HGH THRU: HCPCS | Performed by: NURSE PRACTITIONER

## 2022-05-25 DIAGNOSIS — Z12.11 COLON CANCER SCREENING: Primary | ICD-10-CM

## 2022-05-25 RX ORDER — SODIUM, POTASSIUM,MAG SULFATES 17.5-3.13G
1 SOLUTION, RECONSTITUTED, ORAL ORAL TAKE AS DIRECTED
Qty: 354 ML | Refills: 0 | Status: SHIPPED | OUTPATIENT
Start: 2022-05-25 | End: 2022-08-02

## 2022-06-13 ENCOUNTER — APPOINTMENT (OUTPATIENT)
Dept: PREADMISSION TESTING | Facility: HOSPITAL | Age: 62
End: 2022-06-13

## 2022-06-13 LAB — SARS-COV-2 RNA PNL SPEC NAA+PROBE: NOT DETECTED

## 2022-06-13 PROCEDURE — C9803 HOPD COVID-19 SPEC COLLECT: HCPCS

## 2022-06-13 PROCEDURE — U0004 COV-19 TEST NON-CDC HGH THRU: HCPCS

## 2022-06-16 ENCOUNTER — OUTSIDE FACILITY SERVICE (OUTPATIENT)
Dept: GASTROENTEROLOGY | Facility: CLINIC | Age: 62
End: 2022-06-16

## 2022-06-16 PROCEDURE — 88342 IMHCHEM/IMCYTCHM 1ST ANTB: CPT | Performed by: INTERNAL MEDICINE

## 2022-06-16 PROCEDURE — 88341 IMHCHEM/IMCYTCHM EA ADD ANTB: CPT | Performed by: INTERNAL MEDICINE

## 2022-06-16 PROCEDURE — 88365 INSITU HYBRIDIZATION (FISH): CPT | Performed by: INTERNAL MEDICINE

## 2022-06-16 PROCEDURE — 88364 INSITU HYBRIDIZATION (FISH): CPT | Performed by: INTERNAL MEDICINE

## 2022-06-16 PROCEDURE — 45385 COLONOSCOPY W/LESION REMOVAL: CPT | Performed by: INTERNAL MEDICINE

## 2022-06-16 PROCEDURE — 88305 TISSUE EXAM BY PATHOLOGIST: CPT | Performed by: INTERNAL MEDICINE

## 2022-06-16 PROCEDURE — 43239 EGD BIOPSY SINGLE/MULTIPLE: CPT | Performed by: INTERNAL MEDICINE

## 2022-06-17 ENCOUNTER — LAB REQUISITION (OUTPATIENT)
Dept: LAB | Facility: HOSPITAL | Age: 62
End: 2022-06-17

## 2022-06-17 DIAGNOSIS — Z12.11 ENCOUNTER FOR SCREENING FOR MALIGNANT NEOPLASM OF COLON: ICD-10-CM

## 2022-06-17 DIAGNOSIS — K29.70 GASTRITIS, UNSPECIFIED, WITHOUT BLEEDING: ICD-10-CM

## 2022-06-17 DIAGNOSIS — D12.2 BENIGN NEOPLASM OF ASCENDING COLON: ICD-10-CM

## 2022-06-17 DIAGNOSIS — K57.30 DIVERTICULOSIS OF LARGE INTESTINE WITHOUT PERFORATION OR ABSCESS WITHOUT BLEEDING: ICD-10-CM

## 2022-06-17 DIAGNOSIS — K64.8 OTHER HEMORRHOIDS: ICD-10-CM

## 2022-06-17 DIAGNOSIS — R13.10 DYSPHAGIA, UNSPECIFIED: ICD-10-CM

## 2022-06-21 ENCOUNTER — TELEPHONE (OUTPATIENT)
Dept: GASTROENTEROLOGY | Facility: CLINIC | Age: 62
End: 2022-06-21

## 2022-06-21 DIAGNOSIS — B96.81 HELICOBACTER PYLORI GASTRITIS: Primary | ICD-10-CM

## 2022-06-21 DIAGNOSIS — K29.70 HELICOBACTER PYLORI GASTRITIS: Primary | ICD-10-CM

## 2022-06-21 RX ORDER — ESOMEPRAZOLE MAGNESIUM 40 MG/1
40 CAPSULE, DELAYED RELEASE ORAL DAILY
Qty: 30 CAPSULE | Refills: 5 | Status: SHIPPED | OUTPATIENT
Start: 2022-06-21

## 2022-06-21 RX ORDER — AMOXICILLIN 500 MG/1
1000 CAPSULE ORAL 2 TIMES DAILY
Qty: 56 CAPSULE | Refills: 0 | Status: SHIPPED | OUTPATIENT
Start: 2022-06-21 | End: 2022-08-02

## 2022-06-21 RX ORDER — CLARITHROMYCIN 500 MG/1
500 TABLET, COATED ORAL 2 TIMES DAILY
Qty: 28 TABLET | Refills: 0 | Status: SHIPPED | OUTPATIENT
Start: 2022-06-21 | End: 2022-08-02

## 2022-06-21 NOTE — TELEPHONE ENCOUNTER
I spoke with Ms Wellington. Biopsy results given. Patient will  medication at [facility] pharmacy.

## 2022-06-21 NOTE — TELEPHONE ENCOUNTER
----- Message from Dread Nicholson MD sent at 6/21/2022 10:35 AM EDT -----  Let Mrs. Velazquez know there was evidence for H. Pylori gastritis. I e scribed medication. The colon biopsy was benign tissue. The next colon would be in 10 years without family history.

## 2022-06-22 LAB
CYTO UR: NORMAL
LAB AP CASE REPORT: NORMAL
LAB AP CLINICAL INFORMATION: NORMAL
LAB AP DIAGNOSIS COMMENT: NORMAL
PATH REPORT.ADDENDUM SPEC: NORMAL
PATH REPORT.FINAL DX SPEC: NORMAL
PATH REPORT.GROSS SPEC: NORMAL

## 2022-06-30 ENCOUNTER — HOSPITAL ENCOUNTER (OUTPATIENT)
Dept: MAMMOGRAPHY | Facility: HOSPITAL | Age: 62
Discharge: HOME | End: 2022-06-30
Admitting: FAMILY MEDICINE

## 2022-06-30 DIAGNOSIS — Z12.31 VISIT FOR SCREENING MAMMOGRAM: ICD-10-CM

## 2022-06-30 PROCEDURE — 77067 SCR MAMMO BI INCL CAD: CPT | Performed by: RADIOLOGY

## 2022-06-30 PROCEDURE — 77063 BREAST TOMOSYNTHESIS BI: CPT | Performed by: RADIOLOGY

## 2022-06-30 PROCEDURE — 77067 SCR MAMMO BI INCL CAD: CPT

## 2022-06-30 PROCEDURE — 77063 BREAST TOMOSYNTHESIS BI: CPT

## 2022-07-18 ENCOUNTER — TRANSCRIBE ORDERS (OUTPATIENT)
Dept: MAMMOGRAPHY | Facility: HOSPITAL | Age: 62
End: 2022-07-18

## 2022-07-18 ENCOUNTER — HOSPITAL ENCOUNTER (OUTPATIENT)
Dept: ULTRASOUND IMAGING | Facility: HOSPITAL | Age: 62
Discharge: HOME | End: 2022-07-18
Admitting: RADIOLOGY

## 2022-07-18 DIAGNOSIS — R92.8 ABNORMAL MAMMOGRAM: Primary | ICD-10-CM

## 2022-07-18 DIAGNOSIS — R92.8 ABNORMAL MAMMOGRAM: ICD-10-CM

## 2022-07-18 PROCEDURE — 76642 ULTRASOUND BREAST LIMITED: CPT | Performed by: RADIOLOGY

## 2022-07-18 PROCEDURE — 76642 ULTRASOUND BREAST LIMITED: CPT

## 2022-08-02 ENCOUNTER — HOSPITAL ENCOUNTER (EMERGENCY)
Facility: HOSPITAL | Age: 62
Discharge: HOME | End: 2022-08-03
Attending: EMERGENCY MEDICINE | Admitting: EMERGENCY MEDICINE

## 2022-08-02 ENCOUNTER — APPOINTMENT (OUTPATIENT)
Dept: CT IMAGING | Facility: HOSPITAL | Age: 62
End: 2022-08-02

## 2022-08-02 VITALS
SYSTOLIC BLOOD PRESSURE: 153 MMHG | OXYGEN SATURATION: 98 % | RESPIRATION RATE: 18 BRPM | WEIGHT: 225 LBS | BODY MASS INDEX: 41.41 KG/M2 | HEART RATE: 80 BPM | TEMPERATURE: 97.9 F | DIASTOLIC BLOOD PRESSURE: 78 MMHG | HEIGHT: 62 IN

## 2022-08-02 DIAGNOSIS — S39.011A ABDOMINAL WALL STRAIN, INITIAL ENCOUNTER: Primary | ICD-10-CM

## 2022-08-02 DIAGNOSIS — N39.0 ACUTE UTI: ICD-10-CM

## 2022-08-02 PROCEDURE — 96375 TX/PRO/DX INJ NEW DRUG ADDON: CPT

## 2022-08-02 PROCEDURE — 83690 ASSAY OF LIPASE: CPT | Performed by: EMERGENCY MEDICINE

## 2022-08-02 PROCEDURE — 81001 URINALYSIS AUTO W/SCOPE: CPT | Performed by: EMERGENCY MEDICINE

## 2022-08-02 PROCEDURE — 82565 ASSAY OF CREATININE: CPT

## 2022-08-02 PROCEDURE — 25010000002 HYDROMORPHONE PER 4 MG: Performed by: EMERGENCY MEDICINE

## 2022-08-02 PROCEDURE — 87086 URINE CULTURE/COLONY COUNT: CPT | Performed by: EMERGENCY MEDICINE

## 2022-08-02 PROCEDURE — 85025 COMPLETE CBC W/AUTO DIFF WBC: CPT | Performed by: EMERGENCY MEDICINE

## 2022-08-02 PROCEDURE — 80053 COMPREHEN METABOLIC PANEL: CPT | Performed by: EMERGENCY MEDICINE

## 2022-08-02 PROCEDURE — 25010000002 ONDANSETRON PER 1 MG: Performed by: EMERGENCY MEDICINE

## 2022-08-02 PROCEDURE — 96374 THER/PROPH/DIAG INJ IV PUSH: CPT

## 2022-08-02 PROCEDURE — 99283 EMERGENCY DEPT VISIT LOW MDM: CPT

## 2022-08-02 RX ORDER — HYDROMORPHONE HYDROCHLORIDE 1 MG/ML
0.5 INJECTION, SOLUTION INTRAMUSCULAR; INTRAVENOUS; SUBCUTANEOUS ONCE
Status: COMPLETED | OUTPATIENT
Start: 2022-08-02 | End: 2022-08-02

## 2022-08-02 RX ORDER — ONDANSETRON 2 MG/ML
4 INJECTION INTRAMUSCULAR; INTRAVENOUS ONCE
Status: COMPLETED | OUTPATIENT
Start: 2022-08-02 | End: 2022-08-02

## 2022-08-02 RX ORDER — SODIUM CHLORIDE 0.9 % (FLUSH) 0.9 %
10 SYRINGE (ML) INJECTION AS NEEDED
Status: DISCONTINUED | OUTPATIENT
Start: 2022-08-02 | End: 2022-08-03 | Stop reason: HOSPADM

## 2022-08-02 RX ADMIN — SODIUM CHLORIDE, POTASSIUM CHLORIDE, SODIUM LACTATE AND CALCIUM CHLORIDE 500 ML: 600; 310; 30; 20 INJECTION, SOLUTION INTRAVENOUS at 23:36

## 2022-08-02 RX ADMIN — HYDROMORPHONE HYDROCHLORIDE 0.5 MG: 1 INJECTION, SOLUTION INTRAMUSCULAR; INTRAVENOUS; SUBCUTANEOUS at 23:37

## 2022-08-02 RX ADMIN — ONDANSETRON 4 MG: 2 INJECTION INTRAMUSCULAR; INTRAVENOUS at 23:37

## 2022-08-03 ENCOUNTER — APPOINTMENT (OUTPATIENT)
Dept: CT IMAGING | Facility: HOSPITAL | Age: 62
End: 2022-08-03

## 2022-08-03 LAB
ALBUMIN SERPL-MCNC: 4.2 G/DL (ref 3.5–5.2)
ALBUMIN/GLOB SERPL: 1.1 G/DL
ALP SERPL-CCNC: 173 U/L (ref 39–117)
ALT SERPL W P-5'-P-CCNC: 44 U/L (ref 1–33)
ANION GAP SERPL CALCULATED.3IONS-SCNC: 11 MMOL/L (ref 5–15)
AST SERPL-CCNC: 29 U/L (ref 1–32)
BACTERIA UR QL AUTO: ABNORMAL /HPF
BASOPHILS # BLD AUTO: 0.05 10*3/MM3 (ref 0–0.2)
BASOPHILS NFR BLD AUTO: 0.7 % (ref 0–1.5)
BILIRUB SERPL-MCNC: 0.4 MG/DL (ref 0–1.2)
BILIRUB UR QL STRIP: NEGATIVE
BUN SERPL-MCNC: 19 MG/DL (ref 8–23)
BUN/CREAT SERPL: 24.1 (ref 7–25)
CALCIUM SPEC-SCNC: 9.6 MG/DL (ref 8.6–10.5)
CHLORIDE SERPL-SCNC: 98 MMOL/L (ref 98–107)
CLARITY UR: ABNORMAL
CO2 SERPL-SCNC: 28 MMOL/L (ref 22–29)
COLOR UR: YELLOW
CREAT BLDA-MCNC: 0.6 MG/DL (ref 0.6–1.3)
CREAT SERPL-MCNC: 0.79 MG/DL (ref 0.57–1)
DEPRECATED RDW RBC AUTO: 39.2 FL (ref 37–54)
EGFRCR SERPLBLD CKD-EPI 2021: 84.7 ML/MIN/1.73
EOSINOPHIL # BLD AUTO: 0.12 10*3/MM3 (ref 0–0.4)
EOSINOPHIL NFR BLD AUTO: 1.6 % (ref 0.3–6.2)
ERYTHROCYTE [DISTWIDTH] IN BLOOD BY AUTOMATED COUNT: 11.9 % (ref 12.3–15.4)
GLOBULIN UR ELPH-MCNC: 3.8 GM/DL
GLUCOSE SERPL-MCNC: 314 MG/DL (ref 65–99)
GLUCOSE UR STRIP-MCNC: ABNORMAL MG/DL
HCT VFR BLD AUTO: 45.7 % (ref 34–46.6)
HGB BLD-MCNC: 15.1 G/DL (ref 12–15.9)
HGB UR QL STRIP.AUTO: ABNORMAL
HYALINE CASTS UR QL AUTO: ABNORMAL /LPF
IMM GRANULOCYTES # BLD AUTO: 0.03 10*3/MM3 (ref 0–0.05)
IMM GRANULOCYTES NFR BLD AUTO: 0.4 % (ref 0–0.5)
KETONES UR QL STRIP: NEGATIVE
LEUKOCYTE ESTERASE UR QL STRIP.AUTO: ABNORMAL
LIPASE SERPL-CCNC: 40 U/L (ref 13–60)
LYMPHOCYTES # BLD AUTO: 2.08 10*3/MM3 (ref 0.7–3.1)
LYMPHOCYTES NFR BLD AUTO: 27.9 % (ref 19.6–45.3)
MCH RBC QN AUTO: 29.6 PG (ref 26.6–33)
MCHC RBC AUTO-ENTMCNC: 33 G/DL (ref 31.5–35.7)
MCV RBC AUTO: 89.6 FL (ref 79–97)
MONOCYTES # BLD AUTO: 0.66 10*3/MM3 (ref 0.1–0.9)
MONOCYTES NFR BLD AUTO: 8.8 % (ref 5–12)
NEUTROPHILS NFR BLD AUTO: 4.52 10*3/MM3 (ref 1.7–7)
NEUTROPHILS NFR BLD AUTO: 60.6 % (ref 42.7–76)
NITRITE UR QL STRIP: NEGATIVE
NRBC BLD AUTO-RTO: 0 /100 WBC (ref 0–0.2)
PH UR STRIP.AUTO: <=5 [PH] (ref 5–8)
PLATELET # BLD AUTO: 241 10*3/MM3 (ref 140–450)
PMV BLD AUTO: 11.8 FL (ref 6–12)
POTASSIUM SERPL-SCNC: 4.3 MMOL/L (ref 3.5–5.2)
PROT SERPL-MCNC: 8 G/DL (ref 6–8.5)
PROT UR QL STRIP: ABNORMAL
RBC # BLD AUTO: 5.1 10*6/MM3 (ref 3.77–5.28)
RBC # UR STRIP: ABNORMAL /HPF
REF LAB TEST METHOD: ABNORMAL
SODIUM SERPL-SCNC: 137 MMOL/L (ref 136–145)
SP GR UR STRIP: 1.04 (ref 1–1.03)
SQUAMOUS #/AREA URNS HPF: ABNORMAL /HPF
UROBILINOGEN UR QL STRIP: ABNORMAL
WBC # UR STRIP: ABNORMAL /HPF
WBC NRBC COR # BLD: 7.46 10*3/MM3 (ref 3.4–10.8)

## 2022-08-03 PROCEDURE — 25010000002 IOPAMIDOL 61 % SOLUTION: Performed by: EMERGENCY MEDICINE

## 2022-08-03 PROCEDURE — 74177 CT ABD & PELVIS W/CONTRAST: CPT

## 2022-08-03 RX ORDER — HYDROCODONE BITARTRATE AND ACETAMINOPHEN 5; 325 MG/1; MG/1
1 TABLET ORAL EVERY 6 HOURS PRN
Qty: 12 TABLET | Refills: 0 | Status: SHIPPED | OUTPATIENT
Start: 2022-08-03

## 2022-08-03 RX ORDER — CEFDINIR 300 MG/1
300 CAPSULE ORAL ONCE
Status: COMPLETED | OUTPATIENT
Start: 2022-08-03 | End: 2022-08-03

## 2022-08-03 RX ORDER — CEFDINIR 300 MG/1
300 CAPSULE ORAL 2 TIMES DAILY
Qty: 13 CAPSULE | Refills: 0 | OUTPATIENT
Start: 2022-08-03 | End: 2022-08-05

## 2022-08-03 RX ADMIN — IOPAMIDOL 90 ML: 612 INJECTION, SOLUTION INTRAVENOUS at 01:02

## 2022-08-03 RX ADMIN — CEFDINIR 300 MG: 300 CAPSULE ORAL at 02:52

## 2022-08-03 NOTE — ED PROVIDER NOTES
"Subjective   61-year-old female presents for evaluation of abdominal pain.  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  On Sunday, she was reaching for something at work when she felt a \"pull and pop\" in her left upper quadrant.  She notes that she has been experiencing persistent and worsening pain in her left abdominal region since that time.  She went to urgent care regarding her symptoms today and was sent here for CT scan.  She denies any urinary symptoms.  No rashes.  The pain is worse with movement.  She currently rates her pain at 9 out of 10 in severity.  She states that she is unable to work given her significant pain.          Review of Systems   Gastrointestinal: Positive for abdominal pain.   All other systems reviewed and are negative.      Past Medical History:   Diagnosis Date   • Hypertension    • Iron deficiency anemia secondary to blood loss (chronic)     heavy menses       No Known Allergies    Past Surgical History:   Procedure Laterality Date   • TONSILLECTOMY     • TOTAL ABDOMINAL HYSTERECTOMY WITH SALPINGO OOPHORECTOMY     • TUBAL ABDOMINAL LIGATION         Family History   Problem Relation Age of Onset   • Hyperlipidemia Mother    • Hypertension Mother    • Diabetes Mother    • Obesity Mother    • No Known Problems Father    • Breast cancer Neg Hx        Social History     Socioeconomic History   • Marital status:    Tobacco Use   • Smoking status: Never Smoker   • Smokeless tobacco: Never Used   Vaping Use   • Vaping Use: Never used   Substance and Sexual Activity   • Alcohol use: No   • Drug use: No   • Sexual activity: Defer           Objective   Physical Exam  Vitals and nursing note reviewed.   Constitutional:       General: She is not in acute distress.     Appearance: She is well-developed. She is not diaphoretic.      Comments: Nontoxic-appearing female   HENT:      Head: Normocephalic and atraumatic.   Eyes:      Pupils: Pupils are equal, round, and reactive to light. " "  Cardiovascular:      Rate and Rhythm: Normal rate and regular rhythm.      Heart sounds: Normal heart sounds. No murmur heard.    No friction rub. No gallop.   Pulmonary:      Effort: Pulmonary effort is normal. No respiratory distress.      Breath sounds: Normal breath sounds. No wheezing or rales.   Abdominal:      General: Bowel sounds are normal. There is no distension.      Palpations: Abdomen is soft. There is no mass.      Tenderness: There is abdominal tenderness. There is guarding. There is no rebound.      Comments: Focal tenderness noted to left upper quadrant with guarding noted, no pain out of proportion to exam   Genitourinary:     Comments: No CVA tenderness present  Musculoskeletal:         General: Normal range of motion.      Cervical back: Neck supple.   Skin:     General: Skin is warm and dry.      Findings: No erythema or rash.      Comments: No dermatomal rash noted to left flank or abdominal wall   Neurological:      General: No focal deficit present.      Mental Status: She is alert and oriented to person, place, and time.   Psychiatric:         Mood and Affect: Mood normal.         Thought Content: Thought content normal.         Judgment: Judgment normal.         Procedures           ED Course  ED Course as of 08/03/22 0222   Tue Aug 02, 2022   2255 61-year-old female presents for evaluation of left upper quadrant abdominal pain.  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  On Sunday, she was reaching for something when she felt a \"pull and pop\" in her left upper quadrant.  She has been experiencing pain in her left abdomen since that time.  She went to urgent care regarding her symptoms today and was subsequently referred here for imaging.  On arrival to the ED, the patient is nontoxic-appearing.  She has focal left upper quadrant abdominal tenderness with guarding noted.  No pain out of proportion to exam.  We will obtain labs and imaging, and we will reassess following " initial interventions. [DD]   Wed Aug 03, 2022   0111 Urinalysis is suggestive of infection.  Urine culture obtained.  Prescription for cefdinir.  No CVA tenderness noted on exam. [DD]   0221 CT of abdomen/pelvis is negative.  Patient reassured and counseled regarding symptomatic management of muscle strain.  Encouraged NSAIDs as needed.  Prescription for cefdinir for her UTI.  She will follow-up with her primary care physician within the next week.  Agreeable with plan and given appropriate strict return precautions. [DD]      ED Course User Index  [DD] Onel Sung MD                                       Recent Results (from the past 24 hour(s))   Comprehensive Metabolic Panel    Collection Time: 08/02/22 11:35 PM    Specimen: Blood   Result Value Ref Range    Glucose 314 (H) 65 - 99 mg/dL    BUN 19 8 - 23 mg/dL    Creatinine 0.79 0.57 - 1.00 mg/dL    Sodium 137 136 - 145 mmol/L    Potassium 4.3 3.5 - 5.2 mmol/L    Chloride 98 98 - 107 mmol/L    CO2 28.0 22.0 - 29.0 mmol/L    Calcium 9.6 8.6 - 10.5 mg/dL    Total Protein 8.0 6.0 - 8.5 g/dL    Albumin 4.20 3.50 - 5.20 g/dL    ALT (SGPT) 44 (H) 1 - 33 U/L    AST (SGOT) 29 1 - 32 U/L    Alkaline Phosphatase 173 (H) 39 - 117 U/L    Total Bilirubin 0.4 0.0 - 1.2 mg/dL    Globulin 3.8 gm/dL    A/G Ratio 1.1 g/dL    BUN/Creatinine Ratio 24.1 7.0 - 25.0    Anion Gap 11.0 5.0 - 15.0 mmol/L    eGFR 84.7 >60.0 mL/min/1.73   Lipase    Collection Time: 08/02/22 11:35 PM    Specimen: Blood   Result Value Ref Range    Lipase 40 13 - 60 U/L   CBC Auto Differential    Collection Time: 08/02/22 11:35 PM    Specimen: Blood   Result Value Ref Range    WBC 7.46 3.40 - 10.80 10*3/mm3    RBC 5.10 3.77 - 5.28 10*6/mm3    Hemoglobin 15.1 12.0 - 15.9 g/dL    Hematocrit 45.7 34.0 - 46.6 %    MCV 89.6 79.0 - 97.0 fL    MCH 29.6 26.6 - 33.0 pg    MCHC 33.0 31.5 - 35.7 g/dL    RDW 11.9 (L) 12.3 - 15.4 %    RDW-SD 39.2 37.0 - 54.0 fl    MPV 11.8 6.0 - 12.0 fL    Platelets 241 140 - 450  10*3/mm3    Neutrophil % 60.6 42.7 - 76.0 %    Lymphocyte % 27.9 19.6 - 45.3 %    Monocyte % 8.8 5.0 - 12.0 %    Eosinophil % 1.6 0.3 - 6.2 %    Basophil % 0.7 0.0 - 1.5 %    Immature Grans % 0.4 0.0 - 0.5 %    Neutrophils, Absolute 4.52 1.70 - 7.00 10*3/mm3    Lymphocytes, Absolute 2.08 0.70 - 3.10 10*3/mm3    Monocytes, Absolute 0.66 0.10 - 0.90 10*3/mm3    Eosinophils, Absolute 0.12 0.00 - 0.40 10*3/mm3    Basophils, Absolute 0.05 0.00 - 0.20 10*3/mm3    Immature Grans, Absolute 0.03 0.00 - 0.05 10*3/mm3    nRBC 0.0 0.0 - 0.2 /100 WBC   Urinalysis With Culture If Indicated - Urine, Clean Catch    Collection Time: 08/02/22 11:43 PM    Specimen: Urine, Clean Catch   Result Value Ref Range    Color, UA Yellow Yellow, Straw    Appearance, UA Turbid (A) Clear    pH, UA <=5.0 5.0 - 8.0    Specific Gravity, UA 1.036 (H) 1.001 - 1.030    Glucose, UA >=1000 mg/dL (3+) (A) Negative    Ketones, UA Negative Negative    Bilirubin, UA Negative Negative    Blood, UA Moderate (2+) (A) Negative    Protein, UA Trace (A) Negative    Leuk Esterase, UA Moderate (2+) (A) Negative    Nitrite, UA Negative Negative    Urobilinogen, UA 0.2 E.U./dL 0.2 - 1.0 E.U./dL   Urinalysis, Microscopic Only - Urine, Clean Catch    Collection Time: 08/02/22 11:43 PM    Specimen: Urine, Clean Catch   Result Value Ref Range    RBC, UA 3-6 (A) None Seen, 0-2 /HPF    WBC, UA Too Numerous to Count (A) None Seen, 0-2 /HPF    Bacteria, UA Trace None Seen, Trace /HPF    Squamous Epithelial Cells, UA 7-12 (A) None Seen, 0-2 /HPF    Hyaline Casts, UA 0-6 0 - 6 /LPF    Methodology Automated Microscopy    POC Creatinine    Collection Time: 08/02/22 11:50 PM    Specimen: Blood   Result Value Ref Range    Creatinine 0.60 0.60 - 1.30 mg/dL     Note: In addition to lab results from this visit, the labs listed above may include labs taken at another facility or during a different encounter within the last 24 hours. Please correlate lab times with ED admission and  "discharge times for further clarification of the services performed during this visit.    CT Abdomen Pelvis With Contrast   Final Result      No acute abdominal or pelvic process seen.      Small fat-containing umbilical hernia.      Cholelithiasis.      Hepatic steatosis and enlargement.      Electronically signed by:  Chase Hernandez M.D.     8/2/2022 11:56 PM Mountain Time        Vitals:    08/02/22 1928   BP: 153/78   BP Location: Right arm   Patient Position: Sitting   Pulse: 80   Resp: 18   Temp: 97.9 °F (36.6 °C)   TempSrc: Oral   SpO2: 98%   Weight: 102 kg (225 lb)   Height: 157.5 cm (62\")     Medications   sodium chloride 0.9 % flush 10 mL (has no administration in time range)   cefdinir (OMNICEF) capsule 300 mg (has no administration in time range)   lactated ringers bolus 500 mL (0 mL Intravenous Stopped 8/3/22 0202)   ondansetron (ZOFRAN) injection 4 mg (4 mg Intravenous Given 8/2/22 2337)   HYDROmorphone (DILAUDID) injection 0.5 mg (0.5 mg Intravenous Given 8/2/22 2337)   iopamidol (ISOVUE-300) 61 % injection 100 mL (90 mL Intravenous Given 8/3/22 0102)     ECG/EMG Results (last 24 hours)     ** No results found for the last 24 hours. **        No orders to display              MDM    Final diagnoses:   Abdominal wall strain, initial encounter   Acute UTI       ED Disposition  ED Disposition     ED Disposition   Discharge    Condition   Stable    Comment   --             Sunitha Singh MD  [address] [place], [place] [id number] [phone]    In 1 week           Medication List      New Prescriptions    cefdinir 300 MG capsule  Commonly known as: OMNICEF  Take 1 capsule by mouth 2 (Two) Times a Day.     HYDROcodone-acetaminophen 5-325 MG per tablet  Commonly known as: NORCO  Take 1 tablet by mouth Every 6 (Six) Hours As Needed for Moderate Pain .           Where to Get Your Medications      These medications were sent to [facility]  [address] , [facility] [address]    Hours: 7:00 AM-5:30 PM M-F, 8:00 AM-4:30 PM Sat-Sun Phone: [phone]   · cefdinir 300 MG capsule  · HYDROcodone-acetaminophen 5-325 MG per tablet          Onel Sung MD  08/03/22 0226

## 2022-08-04 LAB — BACTERIA SPEC AEROBE CULT: NORMAL

## 2022-08-05 ENCOUNTER — APPOINTMENT (OUTPATIENT)
Dept: GENERAL RADIOLOGY | Facility: HOSPITAL | Age: 62
End: 2022-08-05

## 2022-08-05 ENCOUNTER — APPOINTMENT (OUTPATIENT)
Dept: CT IMAGING | Facility: HOSPITAL | Age: 62
End: 2022-08-05

## 2022-08-05 ENCOUNTER — TELEPHONE (OUTPATIENT)
Dept: FAMILY MEDICINE CLINIC | Facility: CLINIC | Age: 62
End: 2022-08-05

## 2022-08-05 ENCOUNTER — HOSPITAL ENCOUNTER (EMERGENCY)
Facility: HOSPITAL | Age: 62
Discharge: HOME | End: 2022-08-05
Attending: EMERGENCY MEDICINE | Admitting: EMERGENCY MEDICINE

## 2022-08-05 VITALS
OXYGEN SATURATION: 96 % | HEART RATE: 69 BPM | TEMPERATURE: 98.3 F | RESPIRATION RATE: 20 BRPM | WEIGHT: 223.55 LBS | BODY MASS INDEX: 41.14 KG/M2 | SYSTOLIC BLOOD PRESSURE: 180 MMHG | DIASTOLIC BLOOD PRESSURE: 93 MMHG | HEIGHT: 62 IN

## 2022-08-05 DIAGNOSIS — K76.0 FATTY LIVER: ICD-10-CM

## 2022-08-05 DIAGNOSIS — J98.11 ATELECTASIS OF LEFT LUNG: ICD-10-CM

## 2022-08-05 DIAGNOSIS — S39.011A STRAIN OF ABDOMINAL WALL, INITIAL ENCOUNTER: ICD-10-CM

## 2022-08-05 DIAGNOSIS — J18.9 PNEUMONIA OF RIGHT MIDDLE LOBE DUE TO INFECTIOUS ORGANISM: Primary | ICD-10-CM

## 2022-08-05 DIAGNOSIS — R73.9 HYPERGLYCEMIA: ICD-10-CM

## 2022-08-05 LAB
ALBUMIN SERPL-MCNC: 3.5 G/DL (ref 3.5–5.2)
ALBUMIN/GLOB SERPL: 1.1 G/DL
ALP SERPL-CCNC: 135 U/L (ref 39–117)
ALT SERPL W P-5'-P-CCNC: 32 U/L (ref 1–33)
ANION GAP SERPL CALCULATED.3IONS-SCNC: 9 MMOL/L (ref 5–15)
AST SERPL-CCNC: 21 U/L (ref 1–32)
BASOPHILS # BLD AUTO: 0.02 10*3/MM3 (ref 0–0.2)
BASOPHILS NFR BLD AUTO: 0.2 % (ref 0–1.5)
BILIRUB SERPL-MCNC: 0.4 MG/DL (ref 0–1.2)
BUN SERPL-MCNC: 11 MG/DL (ref 8–23)
BUN/CREAT SERPL: 16.2 (ref 7–25)
CALCIUM SPEC-SCNC: 8.6 MG/DL (ref 8.6–10.5)
CHLORIDE SERPL-SCNC: 98 MMOL/L (ref 98–107)
CO2 SERPL-SCNC: 27 MMOL/L (ref 22–29)
CREAT BLDA-MCNC: 0.5 MG/DL (ref 0.6–1.3)
CREAT SERPL-MCNC: 0.68 MG/DL (ref 0.57–1)
D DIMER PPP FEU-MCNC: 0.57 MCGFEU/ML (ref 0.01–0.5)
DEPRECATED RDW RBC AUTO: 37.9 FL (ref 37–54)
EGFRCR SERPLBLD CKD-EPI 2021: 98.6 ML/MIN/1.73
EOSINOPHIL # BLD AUTO: 0.09 10*3/MM3 (ref 0–0.4)
EOSINOPHIL NFR BLD AUTO: 1 % (ref 0.3–6.2)
ERYTHROCYTE [DISTWIDTH] IN BLOOD BY AUTOMATED COUNT: 11.9 % (ref 12.3–15.4)
GLOBULIN UR ELPH-MCNC: 3.3 GM/DL
GLUCOSE SERPL-MCNC: 368 MG/DL (ref 65–99)
HCT VFR BLD AUTO: 42.8 % (ref 34–46.6)
HGB BLD-MCNC: 14.7 G/DL (ref 12–15.9)
HOLD SPECIMEN: NORMAL
IMM GRANULOCYTES # BLD AUTO: 0.03 10*3/MM3 (ref 0–0.05)
IMM GRANULOCYTES NFR BLD AUTO: 0.3 % (ref 0–0.5)
LIPASE SERPL-CCNC: 30 U/L (ref 13–60)
LYMPHOCYTES # BLD AUTO: 1.21 10*3/MM3 (ref 0.7–3.1)
LYMPHOCYTES NFR BLD AUTO: 13.7 % (ref 19.6–45.3)
MCH RBC QN AUTO: 30.2 PG (ref 26.6–33)
MCHC RBC AUTO-ENTMCNC: 34.3 G/DL (ref 31.5–35.7)
MCV RBC AUTO: 87.9 FL (ref 79–97)
MONOCYTES # BLD AUTO: 0.7 10*3/MM3 (ref 0.1–0.9)
MONOCYTES NFR BLD AUTO: 7.9 % (ref 5–12)
NEUTROPHILS NFR BLD AUTO: 6.81 10*3/MM3 (ref 1.7–7)
NEUTROPHILS NFR BLD AUTO: 76.9 % (ref 42.7–76)
NRBC BLD AUTO-RTO: 0 /100 WBC (ref 0–0.2)
NT-PROBNP SERPL-MCNC: 69.8 PG/ML (ref 0–900)
PLATELET # BLD AUTO: 230 10*3/MM3 (ref 140–450)
PMV BLD AUTO: 11.6 FL (ref 6–12)
POTASSIUM SERPL-SCNC: 4.1 MMOL/L (ref 3.5–5.2)
PROT SERPL-MCNC: 6.8 G/DL (ref 6–8.5)
QT INTERVAL: 352 MS
QTC INTERVAL: 435 MS
RBC # BLD AUTO: 4.87 10*6/MM3 (ref 3.77–5.28)
SODIUM SERPL-SCNC: 134 MMOL/L (ref 136–145)
TROPONIN T SERPL-MCNC: <0.01 NG/ML (ref 0–0.03)
WBC NRBC COR # BLD: 8.86 10*3/MM3 (ref 3.4–10.8)
WHOLE BLOOD HOLD COAG: NORMAL
WHOLE BLOOD HOLD SPECIMEN: NORMAL

## 2022-08-05 PROCEDURE — 93005 ELECTROCARDIOGRAM TRACING: CPT

## 2022-08-05 PROCEDURE — 80053 COMPREHEN METABOLIC PANEL: CPT | Performed by: EMERGENCY MEDICINE

## 2022-08-05 PROCEDURE — 25010000002 KETOROLAC TROMETHAMINE PER 15 MG: Performed by: EMERGENCY MEDICINE

## 2022-08-05 PROCEDURE — 25010000002 ONDANSETRON PER 1 MG: Performed by: EMERGENCY MEDICINE

## 2022-08-05 PROCEDURE — 84484 ASSAY OF TROPONIN QUANT: CPT | Performed by: EMERGENCY MEDICINE

## 2022-08-05 PROCEDURE — 0 IOPAMIDOL PER 1 ML: Performed by: EMERGENCY MEDICINE

## 2022-08-05 PROCEDURE — 85379 FIBRIN DEGRADATION QUANT: CPT | Performed by: PHYSICIAN ASSISTANT

## 2022-08-05 PROCEDURE — 25010000002 DIAZEPAM PER 5 MG: Performed by: EMERGENCY MEDICINE

## 2022-08-05 PROCEDURE — 83690 ASSAY OF LIPASE: CPT | Performed by: PHYSICIAN ASSISTANT

## 2022-08-05 PROCEDURE — 36415 COLL VENOUS BLD VENIPUNCTURE: CPT

## 2022-08-05 PROCEDURE — 93005 ELECTROCARDIOGRAM TRACING: CPT | Performed by: EMERGENCY MEDICINE

## 2022-08-05 PROCEDURE — 74177 CT ABD & PELVIS W/CONTRAST: CPT

## 2022-08-05 PROCEDURE — 96375 TX/PRO/DX INJ NEW DRUG ADDON: CPT

## 2022-08-05 PROCEDURE — 99284 EMERGENCY DEPT VISIT MOD MDM: CPT

## 2022-08-05 PROCEDURE — 83880 ASSAY OF NATRIURETIC PEPTIDE: CPT | Performed by: EMERGENCY MEDICINE

## 2022-08-05 PROCEDURE — 71275 CT ANGIOGRAPHY CHEST: CPT

## 2022-08-05 PROCEDURE — 71045 X-RAY EXAM CHEST 1 VIEW: CPT

## 2022-08-05 PROCEDURE — 96374 THER/PROPH/DIAG INJ IV PUSH: CPT

## 2022-08-05 PROCEDURE — 85025 COMPLETE CBC W/AUTO DIFF WBC: CPT | Performed by: EMERGENCY MEDICINE

## 2022-08-05 PROCEDURE — 82565 ASSAY OF CREATININE: CPT

## 2022-08-05 RX ORDER — DIAZEPAM 5 MG/ML
5 INJECTION, SOLUTION INTRAMUSCULAR; INTRAVENOUS ONCE
Status: COMPLETED | OUTPATIENT
Start: 2022-08-05 | End: 2022-08-05

## 2022-08-05 RX ORDER — SODIUM CHLORIDE 0.9 % (FLUSH) 0.9 %
10 SYRINGE (ML) INJECTION AS NEEDED
Status: DISCONTINUED | OUTPATIENT
Start: 2022-08-05 | End: 2022-08-05 | Stop reason: HOSPADM

## 2022-08-05 RX ORDER — ONDANSETRON 2 MG/ML
4 INJECTION INTRAMUSCULAR; INTRAVENOUS ONCE
Status: COMPLETED | OUTPATIENT
Start: 2022-08-05 | End: 2022-08-05

## 2022-08-05 RX ORDER — LEVOFLOXACIN 500 MG/1
500 TABLET, FILM COATED ORAL DAILY
Qty: 7 TABLET | Refills: 0 | Status: SHIPPED | OUTPATIENT
Start: 2022-08-05 | End: 2022-08-12

## 2022-08-05 RX ORDER — KETOROLAC TROMETHAMINE 30 MG/ML
30 INJECTION, SOLUTION INTRAMUSCULAR; INTRAVENOUS ONCE
Status: COMPLETED | OUTPATIENT
Start: 2022-08-05 | End: 2022-08-05

## 2022-08-05 RX ORDER — CYCLOBENZAPRINE HCL 10 MG
10 TABLET ORAL 3 TIMES DAILY PRN
Qty: 15 TABLET | Refills: 0 | Status: SHIPPED | OUTPATIENT
Start: 2022-08-05

## 2022-08-05 RX ADMIN — IOPAMIDOL 100 ML: 755 INJECTION, SOLUTION INTRAVENOUS at 13:34

## 2022-08-05 RX ADMIN — SODIUM CHLORIDE 1000 ML: 9 INJECTION, SOLUTION INTRAVENOUS at 12:44

## 2022-08-05 RX ADMIN — ONDANSETRON 4 MG: 2 INJECTION INTRAMUSCULAR; INTRAVENOUS at 12:14

## 2022-08-05 RX ADMIN — KETOROLAC TROMETHAMINE 30 MG: 30 INJECTION, SOLUTION INTRAMUSCULAR; INTRAVENOUS at 12:14

## 2022-08-05 RX ADMIN — DIAZEPAM 5 MG: 5 INJECTION, SOLUTION INTRAMUSCULAR; INTRAVENOUS at 12:14

## 2022-08-05 NOTE — TELEPHONE ENCOUNTER
Caller: Eliz Velazquez    Relationship: Self    Best call back number: [phone]     THE PATIENT AND HER DAUGHTER CALLED AND STATED THE PATIENT WAS SEEN IN THE ER ON Tuesday AND TODAY.     WHEN THE PATIENT WENT TO THE ER ON Tuesday IT WAS FOR A WORK INJURY-  PATIENTS DAUGHTER STATED SHE WAS TOLD THAT WHERE THIS IS A WORKERS COMPENSATION CLAIM SHE WOULD HAVE TO GO TO OCCUPATIONAL MEDICINE.     I TRANSFERRED THE PATIENT TO OCCUPATIONAL MEDICINE.     WHEN THE PATIENT WAS SEEN IN THE ER TODAY THE PATIENT WAS DIAGNOSED WITH SEVERAL OTHER THINGS THAT THE DAUGHTER DOES NOT KNOW IF IT WAS DUE TO THE WORK INJURY.     PATIENT WAS DIAGNOSED WITH       Mild to moderate right middle and right lower lobe atelectasis versus pneumonia, and similar milder changes in the left lower lobe.      WANTS TO KNOW IF THE PATIENT NEEDS TO SEE DR KRAMER.

## 2022-08-05 NOTE — ED PROVIDER NOTES
Subjective   Pt is a 62 yo female presenting to ED with complaints of SOB and abdominal pain. PMHx significant for HTN and Anemia. 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 She reports gradually worsening pain that is exacerbated with movement, sitting up and laying on left side. SHe came to ED 2 days ago and had a CT scan of her abdomen and was diagnosed with abdominal muscle wall strain. She was also diagnosed with a UTI and has been taking Omnicef. She reports pain improves with Norco but that is just makes her go to sleep. She came to ED because pain is worsening and she describes intermittent sharp spasms that are radiating into left shoulder blade. She feels SOB from the pain as well. She denies dizziness, fever, cough or N/V/D. She denies bruising or swelling to abdominal wall. She does not take blood thinners. She denies tobacco, drug or ETOH use.       History provided by:  Patient and medical records      Review of Systems   Constitutional: Negative for chills and fever.   HENT: Negative for congestion, sore throat and trouble swallowing.    Eyes: Negative for visual disturbance.   Respiratory: Positive for shortness of breath. Negative for cough.    Cardiovascular: Negative for chest pain and leg swelling.   Gastrointestinal: Positive for abdominal pain. Negative for constipation, diarrhea, nausea and vomiting.   Genitourinary: Negative for difficulty urinating, dysuria, flank pain and hematuria.   Musculoskeletal: Positive for myalgias (Left chest wall). Negative for arthralgias, back pain and neck pain.   Skin: Negative for rash and wound.   Neurological: Negative for dizziness, syncope, speech difficulty, weakness, numbness and headaches.   Psychiatric/Behavioral: Negative for confusion.   All other systems reviewed and are negative.      Past Medical History:   Diagnosis  Date   • Hypertension    • Iron deficiency anemia secondary to blood loss (chronic)     heavy menses       No Known Allergies    Past Surgical History:   Procedure Laterality Date   • TONSILLECTOMY     • TOTAL ABDOMINAL HYSTERECTOMY WITH SALPINGO OOPHORECTOMY     • TUBAL ABDOMINAL LIGATION         Family History   Problem Relation Age of Onset   • Hyperlipidemia Mother    • Hypertension Mother    • Diabetes Mother    • Obesity Mother    • No Known Problems Father    • Breast cancer Neg Hx        Social History     Socioeconomic History   • Marital status:    Tobacco Use   • Smoking status: Never Smoker   • Smokeless tobacco: Never Used   Vaping Use   • Vaping Use: Never used   Substance and Sexual Activity   • Alcohol use: No   • Drug use: No   • Sexual activity: Defer           Objective   Physical Exam  Vitals and nursing note reviewed.   Constitutional:       Appearance: She is well-developed. She is obese.   HENT:      Head: Atraumatic.      Nose: Nose normal.   Eyes:      General: Lids are normal.      Conjunctiva/sclera: Conjunctivae normal.      Pupils: Pupils are equal, round, and reactive to light.   Cardiovascular:      Rate and Rhythm: Normal rate and regular rhythm.      Heart sounds: Normal heart sounds.   Pulmonary:      Effort: Pulmonary effort is normal.      Breath sounds: Normal breath sounds. No wheezing.   Chest:      Chest wall: Tenderness present. No deformity or swelling (posterior inferior left).   Abdominal:      General: There is no distension.      Palpations: Abdomen is soft.      Tenderness: There is abdominal tenderness in the left upper quadrant. There is no right CVA tenderness, guarding or rebound.      Comments: Left anterior and lateral abdominal wall TTP without contusion or obvious edema. No crepitus.    Musculoskeletal:         General: No tenderness. Normal range of motion.      Cervical back: Normal range of motion and neck supple.   Skin:     General: Skin is warm and  dry.      Findings: No erythema or rash.   Neurological:      Mental Status: She is alert and oriented to person, place, and time.      Sensory: No sensory deficit.   Psychiatric:         Mood and Affect: Mood is anxious.         Speech: Speech normal.         Behavior: Behavior normal.         Procedures           ED Course  ED Course as of 08/05/22 2047   Fri Aug 05, 2022   1230 Noted elevated Ddimer. Will obtain CTA chest and repeat CT abd / pelvis due to worsening pain with SOB.  [RT]   1230 D-Dimer, Quant(!): 0.57 [RT]   1315 Creatinine(!): 0.50 [RT]      ED Course User Index  [RT] Tala Toussaint PA      Discussed patient with Dr. Saeed. Will have patient discontinue Omnicef and put patient on Levaquin due to CT Chest findings of possible pneumonia.     Re-examined patient and she is feeling much better. Discussed results and tx plan. Will dc home on Levaquin and Flexeril and she will dc Omnicef.     Reviewed old records.     Recent Results (from the past 24 hour(s))   ECG 12 Lead    Collection Time: 08/05/22 11:11 AM   Result Value Ref Range    QT Interval 352 ms    QTC Interval 435 ms   Lavender Top    Collection Time: 08/05/22 11:28 AM   Result Value Ref Range    Extra Tube hold for add-on    Gray Top    Collection Time: 08/05/22 11:28 AM   Result Value Ref Range    Extra Tube Hold for add-ons.    Light Blue Top    Collection Time: 08/05/22 11:28 AM   Result Value Ref Range    Extra Tube Hold for add-ons.    CBC Auto Differential    Collection Time: 08/05/22 11:28 AM    Specimen: Blood   Result Value Ref Range    WBC 8.86 3.40 - 10.80 10*3/mm3    RBC 4.87 3.77 - 5.28 10*6/mm3    Hemoglobin 14.7 12.0 - 15.9 g/dL    Hematocrit 42.8 34.0 - 46.6 %    MCV 87.9 79.0 - 97.0 fL    MCH 30.2 26.6 - 33.0 pg    MCHC 34.3 31.5 - 35.7 g/dL    RDW 11.9 (L) 12.3 - 15.4 %    RDW-SD 37.9 37.0 - 54.0 fl    MPV 11.6 6.0 - 12.0 fL    Platelets 230 140 - 450 10*3/mm3    Neutrophil % 76.9 (H) 42.7 - 76.0 %    Lymphocyte % 13.7  (L) 19.6 - 45.3 %    Monocyte % 7.9 5.0 - 12.0 %    Eosinophil % 1.0 0.3 - 6.2 %    Basophil % 0.2 0.0 - 1.5 %    Immature Grans % 0.3 0.0 - 0.5 %    Neutrophils, Absolute 6.81 1.70 - 7.00 10*3/mm3    Lymphocytes, Absolute 1.21 0.70 - 3.10 10*3/mm3    Monocytes, Absolute 0.70 0.10 - 0.90 10*3/mm3    Eosinophils, Absolute 0.09 0.00 - 0.40 10*3/mm3    Basophils, Absolute 0.02 0.00 - 0.20 10*3/mm3    Immature Grans, Absolute 0.03 0.00 - 0.05 10*3/mm3    nRBC 0.0 0.0 - 0.2 /100 WBC   D-dimer, Quantitative    Collection Time: 08/05/22 11:28 AM    Specimen: Blood   Result Value Ref Range    D-Dimer, Quantitative 0.57 (H) 0.01 - 0.50 MCGFEU/mL   Comprehensive Metabolic Panel    Collection Time: 08/05/22 12:40 PM    Specimen: Blood   Result Value Ref Range    Glucose 368 (H) 65 - 99 mg/dL    BUN 11 8 - 23 mg/dL    Creatinine 0.68 0.57 - 1.00 mg/dL    Sodium 134 (L) 136 - 145 mmol/L    Potassium 4.1 3.5 - 5.2 mmol/L    Chloride 98 98 - 107 mmol/L    CO2 27.0 22.0 - 29.0 mmol/L    Calcium 8.6 8.6 - 10.5 mg/dL    Total Protein 6.8 6.0 - 8.5 g/dL    Albumin 3.50 3.50 - 5.20 g/dL    ALT (SGPT) 32 1 - 33 U/L    AST (SGOT) 21 1 - 32 U/L    Alkaline Phosphatase 135 (H) 39 - 117 U/L    Total Bilirubin 0.4 0.0 - 1.2 mg/dL    Globulin 3.3 gm/dL    A/G Ratio 1.1 g/dL    BUN/Creatinine Ratio 16.2 7.0 - 25.0    Anion Gap 9.0 5.0 - 15.0 mmol/L    eGFR 98.6 >60.0 mL/min/1.73   BNP    Collection Time: 08/05/22 12:40 PM    Specimen: Blood   Result Value Ref Range    proBNP 69.8 0.0 - 900.0 pg/mL   Troponin    Collection Time: 08/05/22 12:40 PM    Specimen: Blood   Result Value Ref Range    Troponin T <0.010 0.000 - 0.030 ng/mL   Green Top (Gel)    Collection Time: 08/05/22 12:40 PM   Result Value Ref Range    Extra Tube Hold for add-ons.    Gold Top - SST    Collection Time: 08/05/22 12:40 PM   Result Value Ref Range    Extra Tube Hold for add-ons.    Lipase    Collection Time: 08/05/22 12:40 PM    Specimen: Blood   Result Value Ref Range     Lipase 30 13 - 60 U/L   POC Creatinine    Collection Time: 08/05/22 12:44 PM    Specimen: Blood   Result Value Ref Range    Creatinine 0.50 (L) 0.60 - 1.30 mg/dL     Note: In addition to lab results from this visit, the labs listed above may include labs taken at another facility or during a different encounter within the last 24 hours. Please correlate lab times with ED admission and discharge times for further clarification of the services performed during this visit.    CT Angiogram Chest   Final Result   1. No evidence of pulmonary embolus.   2. Mild to moderate right middle and right lower lobe atelectasis versus   pneumonia, and similar milder changes in the left lower lobe. Trace left   pleural effusion.               ABDOMEN AND PELVIS CT SCAN WITH IV CONTRAST: There is dense fatty liver   change, which may represent RUDOLPH. No hepatic lesions are identified.   There is normal contrast opacification of the mesenteric vasculature.   Spleen is not enlarged. Pancreas, adrenal glands, and kidneys appear   unremarkable. Gallbladder is contracted and heterogeneous, with an   appearance suggesting multiple noncalcified gallstones. Please refer to   axial images 71 through 63. No inflammatory change is seen. There is no   evidence of intra or extra hepatic biliary ductal dilatation.       No upper abdominal free air, ascites, adenopathy, or acute inflammatory   change is identified. There is a small fat-only containing periumbilical   hernia. Large and small bowel loops are normal in caliber and normal in   appearance except for what appears to be a 3.7 cm diverticulum of the   junction of the second and third portions the duodenum, axial image 65   series 2, coronal image 51 series 900. There is no evidence of any   associated inflammation or extraluminal air.       Regarding the lower abdomen and pelvis, terminal ileum cecum and   appendix appear normal. Bladder is normally distended and normal in   appearance.  Uterus and ovaries are not identified.       Delayed venous phase images show no evidence of obstructive uropathy.   Bladder opacifies normally with contrast.       Bony structures appear to be intact. In particular, regarding the   patient's history of left upper abdominal pain, no left lower rib   fractures are identified. No left upper quadrant inflammation, abdominal   wall thickening, or mass is seen..           IMPRESSION:       1. Extensive fatty liver change.   2. Small nonstrangulated fat-only containing periumbilical hernia.   3. Incidentally noted, approximately 3.7 cm duodenal diverticulum.   4. Contracted, heterogeneous-appearing gallbladder, likely due to   multiple noncalcified gallstones. No visible inflammation.   5. No evidence of acute inflammatory focus or other clearly acute   intra-abdominal or intrapelvic disease.       This report was finalized on 8/5/2022 1:50 PM by Dr. Edgar Mejia MD.          CT Abdomen Pelvis With Contrast   Final Result   1. No evidence of pulmonary embolus.   2. Mild to moderate right middle and right lower lobe atelectasis versus   pneumonia, and similar milder changes in the left lower lobe. Trace left   pleural effusion.               ABDOMEN AND PELVIS CT SCAN WITH IV CONTRAST: There is dense fatty liver   change, which may represent RUDOLPH. No hepatic lesions are identified.   There is normal contrast opacification of the mesenteric vasculature.   Spleen is not enlarged. Pancreas, adrenal glands, and kidneys appear   unremarkable. Gallbladder is contracted and heterogeneous, with an   appearance suggesting multiple noncalcified gallstones. Please refer to   axial images 71 through 63. No inflammatory change is seen. There is no   evidence of intra or extra hepatic biliary ductal dilatation.       No upper abdominal free air, ascites, adenopathy, or acute inflammatory   change is identified. There is a small fat-only containing periumbilical   hernia. Large and small  bowel loops are normal in caliber and normal in   appearance except for what appears to be a 3.7 cm diverticulum of the   junction of the second and third portions the duodenum, axial image 65   series 2, coronal image 51 series 900. There is no evidence of any   associated inflammation or extraluminal air.       Regarding the lower abdomen and pelvis, terminal ileum cecum and   appendix appear normal. Bladder is normally distended and normal in   appearance. Uterus and ovaries are not identified.       Delayed venous phase images show no evidence of obstructive uropathy.   Bladder opacifies normally with contrast.       Bony structures appear to be intact. In particular, regarding the   patient's history of left upper abdominal pain, no left lower rib   fractures are identified. No left upper quadrant inflammation, abdominal   wall thickening, or mass is seen..           IMPRESSION:       1. Extensive fatty liver change.   2. Small nonstrangulated fat-only containing periumbilical hernia.   3. Incidentally noted, approximately 3.7 cm duodenal diverticulum.   4. Contracted, heterogeneous-appearing gallbladder, likely due to   multiple noncalcified gallstones. No visible inflammation.   5. No evidence of acute inflammatory focus or other clearly acute   intra-abdominal or intrapelvic disease.       This report was finalized on 8/5/2022 1:50 PM by Dr. Edgar Mejia MD.          XR Chest 1 View   Final Result   1.  Right basilar density which could relate to atelectasis.       This report was finalized on 8/5/2022 1:30 PM by Benito Garcia MD.            Vitals:    08/05/22 1224 08/05/22 1230 08/05/22 1357 08/05/22 1453   BP:  155/79 167/84 180/93   BP Location:  Right arm     Patient Position:  Lying     Pulse:  71  69   Resp:  20  20   Temp:       TempSrc:       SpO2: 93% 94% 95% 96%   Weight:       Height:         Medications   diazePAM (VALIUM) injection 5 mg (5 mg Intravenous Given 8/5/22 1214)   ondansetron (ZOFRAN)  injection 4 mg (4 mg Intravenous Given 8/5/22 1214)   ketorolac (TORADOL) injection 30 mg (30 mg Intravenous Given 8/5/22 1214)   sodium chloride 0.9 % bolus 1,000 mL (0 mL Intravenous Stopped 8/5/22 1442)   iopamidol (ISOVUE-370) 76 % injection 100 mL (100 mL Intravenous Given 8/5/22 1334)     ECG/EMG Results (last 24 hours)     Procedure Component Value Units Date/Time    ECG 12 Lead [[id number]] Collected: 08/05/22 1111     Updated: 08/05/22 1135     QT Interval 352 ms      QTC Interval 435 ms     Narrative:      Test Reason : SOA Protocol  Blood Pressure :   */*   mmHG  Vent. Rate :  92 BPM     Atrial Rate :  92 BPM     P-R Int : 134 ms          QRS Dur :  78 ms      QT Int : 352 ms       P-R-T Axes :  30  23  92 degrees     QTc Int : 435 ms    Normal sinus rhythm  Nonspecific ST and T wave abnormality  Abnormal ECG  When compared with ECG of 21-JAN-2019 14:51,  Nonspecific T wave abnormality now evident in Inferior leads  Nonspecific T wave abnormality, worse in Lateral leads    Referred By:            Confirmed By:         ECG 12 Lead   Final Result   Test Reason : SOA Protocol   Blood Pressure :   */*   mmHG   Vent. Rate :  92 BPM     Atrial Rate :  92 BPM      P-R Int : 134 ms          QRS Dur :  78 ms       QT Int : 352 ms       P-R-T Axes :  30  23  92 degrees      QTc Int : 435 ms      Normal sinus rhythm   Nonspecific ST and T wave abnormality   Abnormal ECG   Confirmed by YANNICK WEBBER MD ([id number]) on 8/5/2022 8:36:55 PM      Referred By:            Confirmed By: YANNICK WEBBER MD          DISCHARGE    Patient discharged in stable condition.    Reviewed implications of results, diagnosis, meds, responsibility to follow up, warning signs and symptoms of possible worsening, potential complications and reasons to return to ER.    Patient/Family voiced understanding of above instructions.    Discussed plan for discharge, as there is no emergent indication for admission.  Pt/family is agreeable and understands  need for follow up and possible repeat testing.  Pt/family is aware that discharge does not mean that nothing is wrong but that it indicates no emergency is currently present that requires admission and they must continue care with follow-up as given below or with a physician of their choice.     FOLLOW-UP  Sunitha Singh MD  [address] [address]  [phone]    Schedule an appointment as soon as possible for a visit       [facility] Emergency Department  [address] [place] [id number] [phone]    If symptoms worsen         Medication List      New Prescriptions    cyclobenzaprine 10 MG tablet  Commonly known as: FLEXERIL  Take 1 tablet by mouth 3 (Three) Times a Day As Needed for Muscle Spasms for up to 15 doses.     levoFLOXacin 500 MG tablet  Commonly known as: LEVAQUIN  Take 1 tablet by mouth Daily for 7 days.        Stop    cefdinir 300 MG capsule  Commonly known as: OMNICEF           Where to Get Your Medications      These medications were sent to [facility]  [address] [place] [address] [address] [place] [address]    Hours: 7:00 AM-5:30 PM M-F, 8:00 AM-4:30 PM Sat-Sun Phone: [phone]   · cyclobenzaprine 10 MG tablet  · levoFLOXacin 500 MG tablet                                            MDM    Final diagnoses:   Pneumonia of right middle lobe due to infectious organism   Atelectasis of left lung   Strain of abdominal wall, initial encounter   Hyperglycemia   Fatty liver       ED Disposition  ED Disposition     ED Disposition   Discharge    Condition   Stable    Comment   --             Sunitha Singh MD  [address] [address]  [phone]    Schedule an appointment as soon as possible for a visit       [facility] Emergency Department  [address] [place] [id number] [phone]    If symptoms worsen         Medication List      New  Prescriptions    cyclobenzaprine 10 MG tablet  Commonly known as: FLEXERIL  Take 1 tablet by mouth 3 (Three) Times a Day As Needed for Muscle Spasms for up to 15 doses.     levoFLOXacin 500 MG tablet  Commonly known as: LEVAQUIN  Take 1 tablet by mouth Daily for 7 days.        Stop    cefdinir 300 MG capsule  Commonly known as: OMNICEF           Where to Get Your Medications      These medications were sent to [facility] [place] [address]    Hours: 7:00 AM-5:30 PM M-F, 8:00 AM-4:30 PM Sat-Sun Phone: [phone]   · cyclobenzaprine 10 MG tablet  · levoFLOXacin 500 MG tablet          Tala Toussaint PA  08/05/22 3560

## 2022-08-08 NOTE — TELEPHONE ENCOUNTER
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

## 2022-08-10 ENCOUNTER — OFFICE VISIT (OUTPATIENT)
Dept: FAMILY MEDICINE CLINIC | Facility: CLINIC | Age: 62
End: 2022-08-10

## 2022-08-10 ENCOUNTER — TELEPHONE (OUTPATIENT)
Dept: FAMILY MEDICINE CLINIC | Facility: CLINIC | Age: 62
End: 2022-08-10

## 2022-08-10 VITALS
DIASTOLIC BLOOD PRESSURE: 80 MMHG | SYSTOLIC BLOOD PRESSURE: 140 MMHG | HEART RATE: 75 BPM | HEIGHT: 62 IN | TEMPERATURE: 97.8 F | BODY MASS INDEX: 41.04 KG/M2 | OXYGEN SATURATION: 98 % | WEIGHT: 223 LBS

## 2022-08-10 DIAGNOSIS — S39.011D STRAIN OF ABDOMINAL WALL, SUBSEQUENT ENCOUNTER: ICD-10-CM

## 2022-08-10 DIAGNOSIS — K76.0 FATTY LIVER: ICD-10-CM

## 2022-08-10 DIAGNOSIS — I10 PRIMARY HYPERTENSION: ICD-10-CM

## 2022-08-10 DIAGNOSIS — E11.65 TYPE 2 DIABETES MELLITUS WITH HYPERGLYCEMIA, WITHOUT LONG-TERM CURRENT USE OF INSULIN: Primary | ICD-10-CM

## 2022-08-10 LAB
EXPIRATION DATE: NORMAL
HBA1C MFR BLD: 11.1 %
Lab: NORMAL

## 2022-08-10 PROCEDURE — 83036 HEMOGLOBIN GLYCOSYLATED A1C: CPT | Performed by: FAMILY MEDICINE

## 2022-08-10 PROCEDURE — 99214 OFFICE O/P EST MOD 30 MIN: CPT | Performed by: FAMILY MEDICINE

## 2022-08-10 RX ORDER — LISINOPRIL 20 MG/1
20 TABLET ORAL DAILY
Qty: 30 TABLET | Refills: 3 | Status: SHIPPED | OUTPATIENT
Start: 2022-08-10 | End: 2022-09-12 | Stop reason: SDUPTHER

## 2022-08-10 RX ORDER — LINAGLIPTIN AND METFORMIN HYDROCHLORIDE 2.5; 5 MG/1; MG/1
1 TABLET, FILM COATED ORAL 2 TIMES DAILY
Qty: 60 TABLET | Refills: 3 | Status: SHIPPED | OUTPATIENT
Start: 2022-08-10 | End: 2022-08-11

## 2022-08-10 RX ORDER — OMEGA-3 FATTY ACIDS/FISH OIL 300-1000MG
600 CAPSULE ORAL
COMMUNITY
End: 2023-02-20

## 2022-08-10 NOTE — TELEPHONE ENCOUNTER
PATIENT CALLED SHE SAID THAT THE MEDICATION THAT WAS CALLED IN FOR HER DIABETES WAS TOO EXPENSIVE AND WOULD LIKE TO KNOW IF THERE IS SOMETHING ELSE THAT COULD BE CALLED IN.

## 2022-08-10 NOTE — PROGRESS NOTES
"Chief Complaint  Possible pneumonia (ER 8/5/22), Blood Sugar Problem (elevated), and Fatty Liver    Subjective        Eliz Velazquez presents to [facility]  History of Present Illness   Daughter Sujata  present at appointment and also contributing to history.    Pain is less than 2 days ago. She was standing in front of counter, reached to grab a lid to close cooler at [employer] station and had instant pain. Throbbing aching pain. She couldn't bend over because of pain. She had difficulty doing her job functions. Urgent Care sent her to ED. She had 10 lb work restriction. She had bad pain unable to drive home. She has to hold left arm on abdomen and can't lift it. She was told she had sprained wall. Pain all the way to left shoulder. Hydrocodone just makes her sleep. She called in for work on 8/5/22. She was at occupational medicine 8/8/22 and not able to go back to work until follow-up appt 8/16, advised 2-3 weeks of healing.       Objective   Vital Signs:  /80   Pulse 75   Temp 97.8 °F (36.6 °C)   Ht 157.5 cm (62.01\")   Wt 101 kg (223 lb)   SpO2 98%   BMI 40.78 kg/m²   Estimated body mass index is 40.78 kg/m² as calculated from the following:    Height as of this encounter: 157.5 cm (62.01\").    Weight as of this encounter: 101 kg (223 lb).          Physical Exam  Vitals reviewed.   Constitutional:       General: She is in acute distress ( in pain).      Appearance: She is obese. She is not ill-appearing.   Cardiovascular:      Rate and Rhythm: Normal rate and regular rhythm.   Pulmonary:      Effort: Pulmonary effort is normal.      Breath sounds: Normal breath sounds.   Neurological:      Mental Status: She is alert.   Psychiatric:         Mood and Affect: Mood normal.        Result Review :  The following data was reviewed by: Sunitha Hunt MD on 08/10/2022:  Common labs    Common Labsle 8/2/22 8/2/22 8/2/22 8/5/22 8/5/22 8/5/22 8/10/22    2565 2149 9986 1128 " 1240 1244    Glucose  314 (A)   368 (A)     BUN  19   11     Creatinine  0.79 0.60  0.68 0.50 (A)    Sodium  137   134 (A)     Potassium  4.3   4.1     Chloride  98   98     Calcium  9.6   8.6     Albumin  4.20   3.50     Total Bilirubin  0.4   0.4     Alkaline Phosphatase  173 (A)   135 (A)     AST (SGOT)  29   21     ALT (SGPT)  44 (A)   32     WBC 7.46   8.86      Hemoglobin 15.1   14.7      Hematocrit 45.7   42.8      Platelets 241   230      Hemoglobin A1C       11.1   (A) Abnormal value       Comments are available for some flowsheets but are not being displayed.           UA    Urinalysis 8/2/22 8/2/22    2343 2343   Squamous Epithelial Cells, UA  7-12 (A)   Specific Gravity, UA 1.036 (A)    Ketones, UA Negative    Blood, UA Moderate (2+) (A)    Leukocytes, UA Moderate (2+) (A)    Nitrite, UA Negative    RBC, UA  3-6 (A)   WBC, UA  Too Numerous to Count (A)   Bacteria, UA  Trace   (A) Abnormal value            Urine Culture    Urine Culture 8/2/22   Urine Culture >100,000 CFU/mL Mixed Nan Isolated                ED with Rajiv Saeed MD (08/05/2022)  CT Abdomen Pelvis With Contrast (08/05/2022 13:32)  CT Angiogram Chest (08/05/2022 13:32)  BNP (08/05/2022 12:40)  Troponin (08/05/2022 12:40)  Lipase (08/05/2022 12:40)  D-dimer, Quantitative (08/05/2022 11:28)       Assessment and Plan   Diagnoses and all orders for this visit:    1. Type 2 diabetes mellitus with hyperglycemia, without long-term current use of insulin (HCC) (Primary)  -     POC Glycosylated Hemoglobin (Hb A1C)  -     linaGLIPtin-metFORMIN HCl (Jentadueto) 2.5-500 MG tablet; Take 1 tablet by mouth 2 (Two) Times a Day.  Dispense: 60 tablet; Refill: 3  -     Ambulatory Referral to Diabetic Education  -     lisinopril (PRINIVIL,ZESTRIL) 20 MG tablet; Take 1 tablet by mouth Daily.  Dispense: 30 tablet; Refill: 3  New.  Hyperglycemia noted at emergency department and in office A1c very high at 11.1.  Discussed with patient use of insulin to  treat severe diabetes but she declines injections.  I also recommend at home fingerstick glucose monitoring but she declines.  I advised for diabetes education as well.  At this time she is open to starting pills for diabetes management.  Start with Genta tomorrow.  Reassess in 1 month or sooner if experiencing side effects.  Goal blood pressure less than 130/80 initiate lisinopril for also renal protection.  2. Primary hypertension  -     lisinopril (PRINIVIL,ZESTRIL) 20 MG tablet; Take 1 tablet by mouth Daily.  Dispense: 30 tablet; Refill: 3  New.  Blood pressure has been elevated on several occasions.  Her blood pressure was up to 180 in the emergency department.  Discussed the importance of treating high blood pressure.  Although it is possible blood pressure is elevated from pain I also have concerns for underlying hypertension.  Initiate lisinopril to also help with diabetes renal protection.  3. Fatty liver  Most recent liver enzymes are normal but fatty liver noted on CT scan.  4. Strain of abdominal wall, subsequent encounter  He is being followed by occupational medicine for work-related injury.           Follow Up   Return for Office visit diabetes, HTN .  Patient was given instructions and counseling regarding her condition or for health maintenance advice. Please see specific information pulled into the AVS if appropriate.     Electronically signed by Sunitha Hunt MD, 08/10/22, 12:55 PM EDT.

## 2022-08-11 ENCOUNTER — PATIENT OUTREACH (OUTPATIENT)
Dept: CASE MANAGEMENT | Facility: OTHER | Age: 62
End: 2022-08-11

## 2022-08-11 RX ORDER — SITAGLIPTIN AND METFORMIN HYDROCHLORIDE 500; 50 MG/1; MG/1
1 TABLET, FILM COATED ORAL 2 TIMES DAILY WITH MEALS
Qty: 60 TABLET | Refills: 3 | Status: SHIPPED | OUTPATIENT
Start: 2022-08-11 | End: 2022-11-21 | Stop reason: SDUPTHER

## 2022-08-11 NOTE — TELEPHONE ENCOUNTER
I have changed the prescription to Janumet which is a diabetes pill which contains 2 medications.  You will take 1 pill twice a day.

## 2022-08-11 NOTE — TELEPHONE ENCOUNTER
Attempted to contact, no answer. Left detailed voicemail relaying provider's message     I have changed the prescription to Janumet which is a diabetes pill which contains 2 medications.  You will take 1 pill twice a day.  Hub can relay and document.

## 2022-08-11 NOTE — OUTREACH NOTE
Adult Patient Profile  Questions/Answers    Flowsheet Row Most Recent Value   Symptoms/Conditions Managed at Home diabetes, type 2   Barriers to Managing Health none   Diabetes Management Strategies medication therapy   Diabetes Self-Management Outcome 3 (uncertain)   Missed Doses of Prescribed Medications During Past Week no   Taken Prescribed Medications at Different Time or Schedule During Past Week no   Taken More or Less Medication Than Prescribed no   Barriers to Taking Medication as Prescribed none   How to be Addressed Eliz   How Well Do You Speak English? very well   Source of Information patient        Adult Wellness Assessment  Questions/Answers    Flowsheet Row Most Recent Value   Help with Reading Health-Related Information never   Problems Learning about Medical Condition never   Confidence in Filling Out Forms by Self always      AMBULATORY CASE MANAGEMENT NOTE    Patient Outreach    Call to patient. Recently diagnosed with diabetes after labs from ED visit. A1C was 11.1 when checked. At this time patient has started taking Janument and Lisinopril. Pt has a referral to attend diabetes education classes. Sending link to [employer] as well. Will f/u with patient for further information and concerns    MEY MAE  Ambulatory Case Management    8/11/2022, 15:59 EDT

## 2022-08-22 ENCOUNTER — HOSPITAL ENCOUNTER (OUTPATIENT)
Dept: PHYSICAL THERAPY | Facility: HOSPITAL | Age: 62
Setting detail: THERAPIES SERIES
Discharge: HOME | End: 2022-08-22

## 2022-08-22 DIAGNOSIS — R10.9 ABDOMINAL WALL PAIN IN LEFT FLANK: Primary | ICD-10-CM

## 2022-08-22 DIAGNOSIS — M25.512 ACUTE PAIN OF LEFT SHOULDER: ICD-10-CM

## 2022-08-22 DIAGNOSIS — R07.81 RIB PAIN ON LEFT SIDE: ICD-10-CM

## 2022-08-22 DIAGNOSIS — M54.6 ACUTE LEFT-SIDED THORACIC BACK PAIN: ICD-10-CM

## 2022-08-22 PROCEDURE — 97162 PT EVAL MOD COMPLEX 30 MIN: CPT

## 2022-08-22 NOTE — THERAPY EVALUATION
Outpatient Physical Therapy Ortho Initial Evaluation  [facility]     Patient Name: Eliz Velazquez  : 1960  MRN: [id number]  Today's Date: 2022      Visit Date: 2022    Patient Active Problem List   Diagnosis   • Seasonal allergies   • Primary hypertension   • Fatty liver        Past Medical History:   Diagnosis Date   • Hypertension    • Iron deficiency anemia secondary to blood loss (chronic)     heavy menses        Past Surgical History:   Procedure Laterality Date   • TONSILLECTOMY     • TOTAL ABDOMINAL HYSTERECTOMY WITH SALPINGO OOPHORECTOMY     • TUBAL ABDOMINAL LIGATION         Visit Dx:     ICD-10-CM ICD-9-CM   1. Abdominal wall pain in left flank  R10.9 789.09   2. Rib pain on left side  R07.81 786.50   3. Acute left-sided thoracic back pain  M54.6 724.1   4. Acute pain of left shoulder  M25.512 719.41        Patient History     Row Name 22 1430             History    Chief Complaint Difficulty with daily activities;Muscle tenderness;Muscle weakness;Pain;Tightness;Joint stiffness  -MM      Type of Pain Back pain;Rib pain;Other pain;Shoulder pain  left abdominal wall pain  -MM      Date Current Problem(s) Began 22  -MM      Brief Description of Current Complaint Client injured her left abdominal region 22 while lunging and reaching to close a lid at the [employer] station at work 22. She has severe pain left abdominal region. Pain wraps around the left rib cage to left thoracic region. When the injury occured she felt a tear/strain discomfort in her abdomen. she also has left shoulder pain and limited use of left arm.  -MM      Patient/Caregiver Goals Relieve pain;Return to prior level of function  -MM      Occupation/sports/leisure activities employed at [employer]. (currently off work)  -MM      What clinical tests have you had for this problem? X-ray;CT scan  -MM              Pain     Pain Location Back;Abdomen;Rib cage  -MM      Pain at Present 9  -MM       Pain at Best 7  -MM      Pain at Worst 10  -MM      Pain Frequency Constant/continuous  -MM      Pain Description Burning;Discomfort;Sharp;Crushing  -MM      Pain Comments pain is consant. She has some sharp pain with movement.  -MM      Difficulties with ADL's? breathing, marked difficulty lying down, marked difficulty sitting, difficulty elevating left arm, unable to stoop.  -MM              Fall Risk Assessment    Any falls in the past year: No  -MM              Daily Activities    Primary Language English  -MM      Are you able to read Yes  -MM      Are you able to write Yes  -MM      Barriers to learning None  -MM      Pt Participated in POC and Goals Yes  -MM            User Key  (r) = Recorded By, (t) = Taken By, (c) = Cosigned By    Initials Name Provider Type    MM Chase Mccormick, PT Physical Therapist                 PT Ortho     Row Name 08/22/22 8557       Precautions and Contraindications    Precautions/Limitations no known precautions/limitations  -MM       Posture/Observations    Posture/Observations Comments Client stands due to difficulty sitting and lying down. Palpation: marked tenderness left anterior abdomen, left rib cage to left lower thoracic region.  -MM       General ROM    Head/Neck/Trunk Trunk Extension;Trunk Flexion;Trunk Lt Lateral Flexion;Trunk Rt Lateral Flexion;Trunk Lt Rotation;Trunk Rt Rotation  -MM    LT Upper Ext Lt Shoulder ABduction;Lt Shoulder Flexion;Lt Shoulder Extension  -MM    GENERAL ROM COMMENTS overall very guarded with all movement  -MM       Head/Neck/Trunk    Trunk Extension AROM 13 (leans right)  -MM    Trunk Flexion AROM 25 (severe loss due to pain)  -MM    Trunk Lt Lateral Flexion AROM severe loss of motion  -MM    Trunk Rt Lateral Flexion AROM severe loss of motion with pain on left.  -MM    Trunk Lt Rotation AROM unable to sit and rotation  -MM    Trunk Rt Rotation AROM unable to sit and rotate  -MM    Head/Neck/Trunk Comments pain w deep breath  -MM        Left Upper Ext    Lt Shoulder Abduction AROM 63 w shoulder pain  -MM    Lt Shoulder Flexion AROM 64 w shoulder pain  -MM       MMT (Manual Muscle Testing)    General MMT Comments deferred MmT  -MM       Sensation    Sensation WNL? WNL  -MM       Transfers    Comment, (Transfers) severe difficulty. She sits very slow and careful and in sitting leans to right to avoid pressure on left. unable to lie down on the clinic table.  -MM       Gait/Stairs (Locomotion)    Comment, (Gait/Stairs) slow careful antalgic  -MM          User Key  (r) = Recorded By, (t) = Taken By, (c) = Cosigned By    Initials Name Provider Type    MM Chase Mccormick, PT Physical Therapist              Therapy Education  Education Details: HEP: gentle trunk extension in standing with towel, gentle side glides in standing, pulleys in standing for shoulder flexion  Given: HEP  Program: New  How Provided: Verbal  Provided to: Patient  Level of Understanding: Teach back education performed      PT OP Goals     Row Name 08/22/22 1430          PT Short Term Goals    STG Date to Achieve 09/12/22  -MM     STG 1 Client will report 50% improvement in abdominal pain with daily activity.  -MM     STG 1 Progress New  -MM     STG 2 Client will  improve to sitting with neutral posture without difficulty.  -MM     STG 2 Progress New  -MM     STG 3 Client will be independent with home program to minimize pain.  -MM     STG 3 Progress New  -MM     STG 4 Client will transfer sit to stand without difficulty.  -MM     STG 4 Progress New  -MM            Long Term Goals    LTG Date to Achieve 10/03/22  -MM     LTG 1 QuickDAsh score will improve to 50%.  -MM     LTG 1 Progress New  -MM     LTG 2 Modified Oswestry score will improve to 36%.  -MM     LTG 2 Progress New  -MM     LTG 3 Client will improve to sleeping in her bed without difficulty.  -MM     LTG 3 Progress New  -MM     LTG 4 Client will improve to picking up light items from floor to waist without difficulty.   -MM     LTG 4 Progress New  -MM            Time Calculation    PT Goal Re-Cert Due Date 11/20/22  -MM           User Key  (r) = Recorded By, (t) = Taken By, (c) = Cosigned By    Initials Name Provider Type    MM Chase Mccormick, PT Physical Therapist                 PT Assessment/Plan     Row Name 08/22/22 4620          PT Assessment    Functional Limitations Limitation in home management;Limitations in community activities;Limitations in functional capacity and performance;Performance in leisure activities;Performance in self-care ADL;Performance in work activities  -MM     Impairments Muscle strength;Pain;Range of motion;Joint mobility  -MM     Assessment Comments client presents with evolving symptoms of moderate complexity. She has severe pain in her left anterior abdomen, left lower rib cage, left thoracic region, and left shoulder. She has signs of anterior abdominal wall strain and left shoulder strain. She has very limited movement of her shoulder and her trunk. Tolerance for activity and movement is very limited. She has trunk weakness and arm weakness. Skilled care is required to minimize pain and improve mobility as able.  -MM     Please refer to paper survey for additional self-reported information --  -MM     Rehab Potential Good  -MM     Patient/caregiver participated in establishment of treatment plan and goals Yes  -MM     Patient would benefit from skilled therapy intervention Yes  -MM            PT Plan    PT Frequency 1x/week;2x/week  -MM     Predicted Duration of Therapy Intervention (PT) 2-3 months  -MM     Planned CPT's? PT EVAL LOW COMPLEXITY: 27933;PT THER PROC EA 15 MIN: 95829;PT THER ACT EA 15 MIN: 34541;PT MANUAL THERAPY EA 15 MIN: 11649  -MM     PT Plan Comments Begin at 1x/week and progress to 2x/week as tolerated. Client to work on gentle mobility exercises and to use shoulder pulleys. Client to wear abdominal binder for support.  -MM           User Key  (r) = Recorded By, (t) = Taken  By, (c) = Cosigned By    Initials Name Provider Type    MM Chase Mccormick, PT Physical Therapist              Outcome Measure Options: Modified Oswestry, Quick DASH  Quick DASH  Open a tight or new jar.: Unable  Do heavy household chores (e.g., wash walls, wash floors): Unable  Carry a shopping bag or briefcase: Unable  Wash your back: Unable  Use a knife to cut food: Moderate Difficulty  Recreational activities in which you take some force or impact through your arm, should or hand (e.g. golf, hammering, tennis, etc.): Unable  During the past week, to what extent has your arm, shoulder, or hand problem interfered with your normal social activites with family, friends, neighbors or groups?: Quite a bit  During the past week, were you limited in your work or other regular daily activities as a result of your arm, shoulder or hand problem?: Unable  Arm, Shoulder, or hand pain: Severe  Tingling (pins and needles) in your arm, shoulder, or hand: Mild  During the past week, how much difficulty have you had sleeping because of the pain in your arm, shoulder or hand?: Severe Difficulty  Number of Questions Answered: 11  Quick DASH Score: 81.82  Modified Oswestry  Modified Oswestry Score/Comments: 58%      Time Calculation:     Start Time: 1430  Untimed Charges  PT Eval/Re-eval Minutes: 60  Total Minutes  Untimed Charges Total Minutes: 60   Total Minutes: 60     Therapy Charges for Today     Code Description Service Date Service Provider Modifiers Qty    67289913047 HC PT EVAL MOD COMPLEXITY 4 8/22/2022 Chase Mccormick, PT GP 1          PT G-Codes  Outcome Measure Options: Modified Oswestry, Quick DASH  Quick DASH Score: 81.82  Modified Oswestry Score/Comments: 58%         Chase Mccormick, PT  8/22/2022

## 2022-08-30 ENCOUNTER — HOSPITAL ENCOUNTER (OUTPATIENT)
Dept: PHYSICAL THERAPY | Facility: HOSPITAL | Age: 62
Setting detail: THERAPIES SERIES
Discharge: HOME | End: 2022-08-30

## 2022-08-30 DIAGNOSIS — M54.6 ACUTE LEFT-SIDED THORACIC BACK PAIN: ICD-10-CM

## 2022-08-30 DIAGNOSIS — R07.81 RIB PAIN ON LEFT SIDE: ICD-10-CM

## 2022-08-30 DIAGNOSIS — R10.9 ABDOMINAL WALL PAIN IN LEFT FLANK: Primary | ICD-10-CM

## 2022-08-30 PROCEDURE — 97140 MANUAL THERAPY 1/> REGIONS: CPT

## 2022-08-30 PROCEDURE — 97110 THERAPEUTIC EXERCISES: CPT

## 2022-09-02 ENCOUNTER — HOSPITAL ENCOUNTER (OUTPATIENT)
Dept: PHYSICAL THERAPY | Facility: HOSPITAL | Age: 62
Setting detail: THERAPIES SERIES
Discharge: HOME | End: 2022-09-02

## 2022-09-02 DIAGNOSIS — R07.81 RIB PAIN ON LEFT SIDE: ICD-10-CM

## 2022-09-02 DIAGNOSIS — M54.6 ACUTE LEFT-SIDED THORACIC BACK PAIN: ICD-10-CM

## 2022-09-02 DIAGNOSIS — R10.9 ABDOMINAL WALL PAIN IN LEFT FLANK: Primary | ICD-10-CM

## 2022-09-02 DIAGNOSIS — M25.512 ACUTE PAIN OF LEFT SHOULDER: ICD-10-CM

## 2022-09-02 PROCEDURE — 97140 MANUAL THERAPY 1/> REGIONS: CPT

## 2022-09-02 PROCEDURE — 97110 THERAPEUTIC EXERCISES: CPT

## 2022-09-02 NOTE — THERAPY TREATMENT NOTE
Outpatient Physical Therapy Ortho Treatment Note  [facility]     Patient Name: Eliz Velazquez  : 1960  MRN: [id number]  Today's Date: 2022      Visit Date: 2022    Visit Dx:    ICD-10-CM ICD-9-CM   1. Abdominal wall pain in left flank  R10.9 789.09   2. Rib pain on left side  R07.81 786.50   3. Acute left-sided thoracic back pain  M54.6 724.1   4. Acute pain of left shoulder  M25.512 719.41       Patient Active Problem List   Diagnosis   • Seasonal allergies   • Primary hypertension   • Fatty liver        Past Medical History:   Diagnosis Date   • Hypertension    • Iron deficiency anemia secondary to blood loss (chronic)     heavy menses        Past Surgical History:   Procedure Laterality Date   • TONSILLECTOMY     • TOTAL ABDOMINAL HYSTERECTOMY WITH SALPINGO OOPHORECTOMY     • TUBAL ABDOMINAL LIGATION          PT Assessment/Plan     Row Name 22 8181          PT Assessment    Assessment Comments Client notes she is improving, but she did have pain with UBE in reverse. Other exercises were tolerated with mild soreness. She notes some relief with ASTYM. she has a lot of tenderness left lower trap region.  -MM            PT Plan    PT Plan Comments Continue per plan of treatment with exercises and manual therapy.  -MM           User Key  (r) = Recorded By, (t) = Taken By, (c) = Cosigned By    Initials Name Provider Type    Chase Tobias, PT Physical Therapist               OP Exercises     Row Name 22 4110             Subjective Comments    Subjective Comments Client reports that she is able to do a few more things now around the house. She still has pain when pulling with her left arm.  -MM              Subjective Pain    Able to rate subjective pain? yes  -MM      Pre-Treatment Pain Level 6  -MM      Post-Treatment Pain Level 6  -MM      Subjective Pain Comment Up to 8/10  -MM              Total Minutes    70815 - PT Therapeutic Exercise Minutes 30  -MM      98969 - PT  Manual Therapy Minutes 14  -MM              Exercise 1    Exercise Name 1 UBE  -MM      Time 1 4 min  -MM              Exercise 2    Exercise Name 2 therapy bar: flexion, abduction  -MM      Reps 2 15/15  -MM              Exercise 3    Exercise Name 3 AROM: shoulder extension  -MM      Reps 3 15  -MM              Exercise 4    Exercise Name 4 standing ball rolls forward and lean  -MM      Reps 4 15  -MM              Exercise 5    Exercise Name 5 standing circles w ball  -MM      Reps 5 15 ea  -MM              Exercise 6    Exercise Name 6 thoracic extension w sheet  -MM      Reps 6 20  -MM              Exercise 7    Exercise Name 7 side glides  -MM      Reps 7 15  -MM              Exercise 8    Exercise Name 8 standing rotation  -MM      Reps 8 10/10  -MM      Additional Comments 4# ball  -MM            User Key  (r) = Recorded By, (t) = Taken By, (c) = Cosigned By    Initials Name Provider Type    Chase Tobias, PT Physical Therapist               Manual Rx (last 36 hours)     Manual Treatments     Row Name 09/02/22 0930             Total Minutes    26310 - PT Manual Therapy Minutes 14  -MM              Manual Rx 1    Manual Rx 1 Location Left thoracic region  -MM      Manual Rx 1 Type Provided soft tissue mobilization using ASTYM technique with client seated. Light to moderate pressure used with AStYM.  -MM      Manual Rx 1 Duration 14  -MM            User Key  (r) = Recorded By, (t) = Taken By, (c) = Cosigned By    Initials Name Provider Type    Chase Tobias, PT Physical Therapist              Time Calculation:   Start Time: 0930  Timed Charges  36744 - PT Therapeutic Exercise Minutes: 30  08597 - PT Manual Therapy Minutes: 14  Total Minutes  Timed Charges Total Minutes: 44   Total Minutes: 44  Therapy Charges for Today     Code Description Service Date Service Provider Modifiers Qty    46690101590  PT THER PROC EA 15 MIN 9/2/2022 Chase Mccormick, PT GP 2    07952412025 HC PT MANUAL THERAPY EA  15 MIN 9/2/2022 Chase Mccormick, PT GP 1          Chase Mccormick, PT  9/2/2022

## 2022-09-08 ENCOUNTER — HOSPITAL ENCOUNTER (OUTPATIENT)
Dept: PHYSICAL THERAPY | Facility: HOSPITAL | Age: 62
Setting detail: THERAPIES SERIES
Discharge: HOME | End: 2022-09-08

## 2022-09-08 DIAGNOSIS — R07.81 RIB PAIN ON LEFT SIDE: ICD-10-CM

## 2022-09-08 DIAGNOSIS — R10.9 ABDOMINAL WALL PAIN IN LEFT FLANK: Primary | ICD-10-CM

## 2022-09-08 DIAGNOSIS — M25.512 ACUTE PAIN OF LEFT SHOULDER: ICD-10-CM

## 2022-09-08 DIAGNOSIS — M54.6 ACUTE LEFT-SIDED THORACIC BACK PAIN: ICD-10-CM

## 2022-09-08 PROCEDURE — 97110 THERAPEUTIC EXERCISES: CPT

## 2022-09-08 PROCEDURE — 97140 MANUAL THERAPY 1/> REGIONS: CPT

## 2022-09-08 NOTE — THERAPY TREATMENT NOTE
Outpatient Physical Therapy Ortho Treatment Note  [facility]     Patient Name: Eliz Velazquez  : 1960  MRN: [id number]  Today's Date: 2022      Visit Date: 2022    Visit Dx:    ICD-10-CM ICD-9-CM   1. Abdominal wall pain in left flank  R10.9 789.09   2. Rib pain on left side  R07.81 786.50   3. Acute left-sided thoracic back pain  M54.6 724.1   4. Acute pain of left shoulder  M25.512 719.41       Patient Active Problem List   Diagnosis   • Seasonal allergies   • Primary hypertension   • Fatty liver        Past Medical History:   Diagnosis Date   • Hypertension    • Iron deficiency anemia secondary to blood loss (chronic)     heavy menses        Past Surgical History:   Procedure Laterality Date   • TONSILLECTOMY     • TOTAL ABDOMINAL HYSTERECTOMY WITH SALPINGO OOPHORECTOMY     • TUBAL ABDOMINAL LIGATION          PT Assessment/Plan     Row Name 22 1515          PT Assessment    Assessment Comments Client is now able to move her arms well. She does continue to experience difficulty stooping.  -MM            PT Plan    PT Plan Comments Continue per plan of treatment.  -MM           User Key  (r) = Recorded By, (t) = Taken By, (c) = Cosigned By    Initials Name Provider Type    Chase Tobias, PT Physical Therapist               OP Exercises     Row Name 22 1515             Subjective Comments    Subjective Comments Client reports pain today at 7/10 left lower trap/lower rib cage posterior. She reports overall symptoms are improving. She is now able to reach behind her back with little to no difficulty.  -MM              Subjective Pain    Able to rate subjective pain? yes  -MM      Pre-Treatment Pain Level 7  -MM      Post-Treatment Pain Level 6  -MM      Subjective Pain Comment 5  -MM              Total Minutes    15140 - PT Therapeutic Exercise Minutes 20  -MM      32899 - PT Manual Therapy Minutes 10  -MM              Exercise 1    Exercise Name 1 UBE  -MM      Time 1 4  min  -MM              Exercise 2    Exercise Name 2 therapy bar: flexion, abduction  -MM      Reps 2 15/15  -MM              Exercise 3    Exercise Name 3 AROM: shoulder extension  -MM      Reps 3 15  -MM              Exercise 4    Exercise Name 4 standing ball rolls forward and lean  -MM      Reps 4 15  -MM              Exercise 5    Exercise Name 5 standing circles w ball  -MM      Reps 5 15 ea  -MM              Exercise 6    Exercise Name 6 thoracic extension w sheet  -MM      Reps 6 20  -MM              Exercise 7    Exercise Name 7 side glides  -MM      Reps 7 10 ea  -MM              Exercise 8    Exercise Name 8 standing rotation w bar  -MM      Reps 8 15/15  -MM            User Key  (r) = Recorded By, (t) = Taken By, (c) = Cosigned By    Initials Name Provider Type    Chase Tobias, PT Physical Therapist               Manual Rx (last 36 hours)     Manual Treatments     Row Name 09/08/22 1515             Total Minutes    48268 - PT Manual Therapy Minutes 10  -MM              Manual Rx 1    Manual Rx 1 Location Left thoracic region  -MM      Manual Rx 1 Type Provided soft tissue mobilization using ASTYM technique with client seated. Light to moderate pressure used with AStYM.  -MM      Manual Rx 1 Duration 10  -MM            User Key  (r) = Recorded By, (t) = Taken By, (c) = Cosigned By    Initials Name Provider Type    Chase Tobias, PT Physical Therapist                Time Calculation:   Start Time: 1515  Timed Charges  29709 - PT Therapeutic Exercise Minutes: 20  90484 - PT Manual Therapy Minutes: 10  Total Minutes  Timed Charges Total Minutes: 30   Total Minutes: 30  Therapy Charges for Today     Code Description Service Date Service Provider Modifiers Qty    53046284597  PT THER PROC EA 15 MIN 9/8/2022 Chase Mccormick, PT GP 1    [id number]  PT MANUAL THERAPY EA 15 MIN 9/8/2022 Chase Mccormick, PT GP 1            Chase Mccormick PT  9/8/2022

## 2022-09-12 ENCOUNTER — HOSPITAL ENCOUNTER (OUTPATIENT)
Dept: CARDIOLOGY | Facility: HOSPITAL | Age: 62
Discharge: HOME | End: 2022-09-12
Admitting: FAMILY MEDICINE

## 2022-09-12 ENCOUNTER — OFFICE VISIT (OUTPATIENT)
Dept: FAMILY MEDICINE CLINIC | Facility: CLINIC | Age: 62
End: 2022-09-12

## 2022-09-12 ENCOUNTER — TELEPHONE (OUTPATIENT)
Dept: FAMILY MEDICINE CLINIC | Facility: CLINIC | Age: 62
End: 2022-09-12

## 2022-09-12 VITALS
SYSTOLIC BLOOD PRESSURE: 138 MMHG | DIASTOLIC BLOOD PRESSURE: 82 MMHG | HEIGHT: 62 IN | BODY MASS INDEX: 42.21 KG/M2 | OXYGEN SATURATION: 94 % | WEIGHT: 229.4 LBS | HEART RATE: 75 BPM

## 2022-09-12 VITALS — BODY MASS INDEX: 42.19 KG/M2 | HEIGHT: 62 IN | WEIGHT: 229.28 LBS

## 2022-09-12 DIAGNOSIS — I10 PRIMARY HYPERTENSION: ICD-10-CM

## 2022-09-12 DIAGNOSIS — M79.605 PAIN OF LEFT LOWER EXTREMITY: ICD-10-CM

## 2022-09-12 DIAGNOSIS — E11.65 TYPE 2 DIABETES MELLITUS WITH HYPERGLYCEMIA, WITHOUT LONG-TERM CURRENT USE OF INSULIN: Primary | ICD-10-CM

## 2022-09-12 DIAGNOSIS — R13.10 DYSPHAGIA, UNSPECIFIED TYPE: ICD-10-CM

## 2022-09-12 DIAGNOSIS — M79.605 PAIN OF LEFT LOWER EXTREMITY: Primary | ICD-10-CM

## 2022-09-12 LAB — GLUCOSE BLDC GLUCOMTR-MCNC: 64 MG/DL (ref 70–130)

## 2022-09-12 PROCEDURE — 82962 GLUCOSE BLOOD TEST: CPT | Performed by: FAMILY MEDICINE

## 2022-09-12 PROCEDURE — 99214 OFFICE O/P EST MOD 30 MIN: CPT | Performed by: FAMILY MEDICINE

## 2022-09-12 PROCEDURE — 93971 EXTREMITY STUDY: CPT

## 2022-09-12 RX ORDER — LISINOPRIL 20 MG/1
20 TABLET ORAL DAILY
Qty: 90 TABLET | Refills: 1 | Status: SHIPPED | OUTPATIENT
Start: 2022-09-12 | End: 2022-09-27

## 2022-09-12 NOTE — TELEPHONE ENCOUNTER
Ultrasound is normal without any blood clots.    With continued leg pain symptoms I will further evaluate circulation with additional follow-up testing but it does not have to be performed stat.

## 2022-09-12 NOTE — PROGRESS NOTES
"Chief Complaint  Diabetes and Hypertension (1 month follow up)    Subjective        Eliz Velazquez presents to [facility] PRIMARY CARE  History of Present Illness     Blood pressure checks at occupational medicine clinic has been fine. As good as 120/80. Denies cough.    Changed diet. Only had fried food twice. Grilling food. Poaching fish. Eating a lot of vegetables from the garden. She has increased walking. Never soda. Drinking diet iced tea and water. Fresh water with fruit and/or vegetables. She is taking Janumet twice a day and no missed doses. No abdominal pain or diarrhea. 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 She is scheduled to start diabetes education 9/26/22.     Left leg feels tight like a cramp constantly. Stared on left ankle and moved up to calf. She has trouble sleeping at night. Feels heavy with walking. Worse the past 2 weeks. She is concerned for a blood clot.     She has choking with saliva. EGD performed. She doesn't have problem with swallowing.     Objective   Vital Signs:  /82   Pulse 75   Ht 157.5 cm (62.01\")   Wt 104 kg (229 lb 6.4 oz)   SpO2 94%   BMI 41.95 kg/m²   Estimated body mass index is 41.95 kg/m² as calculated from the following:    Height as of this encounter: 157.5 cm (62.01\").    Weight as of this encounter: 104 kg (229 lb 6.4 oz).    Class 3 Severe Obesity (BMI >=40). Obesity-related health conditions include the following: hypertension and diabetes mellitus. Obesity is worsening. BMI is is above average; BMI management plan is completed. We discussed low sugar diet.      Physical Exam  Vitals reviewed.   Constitutional:       General: She is not in acute distress.     Appearance: She is obese. She is not ill-appearing.   Neck:      Thyroid: No thyroid mass, thyromegaly or thyroid tenderness.   Cardiovascular:      Rate and Rhythm: Normal rate and regular rhythm.   Pulmonary:      Effort: Pulmonary effort " is normal.      Breath sounds: Normal breath sounds.   Musculoskeletal:      Left lower leg: No edema.      Comments: Left calf tenderness without palpable cord, erythema, or warmth   Neurological:      Mental Status: She is alert.        Result Review :  The following data was reviewed by 3(Optional): Sunitha Hunt MD on 09/12/2022:  Common labs    Common Labsle 8/2/22 8/2/22 8/2/22 8/5/22 8/5/22 8/5/22 8/10/22    2335 2335 2350 1128 1240 1244    Glucose  314 (A)   368 (A)     BUN  19   11     Creatinine  0.79 0.60  0.68 0.50 (A)    Sodium  137   134 (A)     Potassium  4.3   4.1     Chloride  98   98     Calcium  9.6   8.6     Albumin  4.20   3.50     Total Bilirubin  0.4   0.4     Alkaline Phosphatase  173 (A)   135 (A)     AST (SGOT)  29   21     ALT (SGPT)  44 (A)   32     WBC 7.46   8.86      Hemoglobin 15.1   14.7      Hematocrit 45.7   42.8      Platelets 241   230      Hemoglobin A1C       11.1   (A) Abnormal value       Comments are available for some flowsheets but are not being displayed.           A1C Last 3 Results    HGBA1C Last 3 Results 8/10/22   Hemoglobin A1C 11.1                  Results for orders placed or performed in visit on 09/12/22   POC Glucose    Specimen: Blood   Result Value Ref Range    Glucose 64 (A) 70 - 130 mg/dL     FL Video Swallow With Speech Single Contrast (04/20/2022 10:41)  ENDOSCOPY, INT (08/16/2022)       Assessment and Plan   Diagnoses and all orders for this visit:    1. Type 2 diabetes mellitus with hyperglycemia, without long-term current use of insulin (HCC) (Primary)  -     POC Glucose  -     lisinopril (PRINIVIL,ZESTRIL) 20 MG tablet; Take 1 tablet by mouth Daily.  Dispense: 90 tablet; Refill: 1  Tolerating Janumet without side effects.  She has upcoming diabetes education.  Her next A1c will be due in November.  Continue ACE inhibitor for renal protection.  2. Primary hypertension  -     lisinopril (PRINIVIL,ZESTRIL) 20 MG tablet; Take 1 tablet by mouth  Daily.  Dispense: 90 tablet; Refill: 1  Blood pressure improved.  Continue lisinopril.  Also with renal protection for diabetes.  3. Pain of left lower extremity  -     Duplex Venous Lower Extremity - Left CAR; Future  New.  Evaluation with ultrasound.  If no signs of blood clot within further work-up for claudication.  4. Dysphagia, unspecified type  -     Ambulatory Referral to ENT (Otolaryngology)  Recommend consultation with ear nose and throat.           Follow Up   Return in about 8 weeks (around 11/10/2022) for Office visit diabetes.  Patient was given instructions and counseling regarding her condition or for health maintenance advice. Please see specific information pulled into the AVS if appropriate.     Electronically signed by Sunitha Hunt MD, 09/12/22, 9:20 AM EDT.

## 2022-09-13 ENCOUNTER — HOSPITAL ENCOUNTER (OUTPATIENT)
Dept: PHYSICAL THERAPY | Facility: HOSPITAL | Age: 62
Setting detail: THERAPIES SERIES
Discharge: HOME | End: 2022-09-13

## 2022-09-13 DIAGNOSIS — R10.9 ABDOMINAL WALL PAIN IN LEFT FLANK: Primary | ICD-10-CM

## 2022-09-13 DIAGNOSIS — M54.6 ACUTE LEFT-SIDED THORACIC BACK PAIN: ICD-10-CM

## 2022-09-13 DIAGNOSIS — R07.81 RIB PAIN ON LEFT SIDE: ICD-10-CM

## 2022-09-13 LAB
BH CV LOWER VASCULAR LEFT COMMON FEMORAL AUGMENT: NORMAL
BH CV LOWER VASCULAR LEFT COMMON FEMORAL COMPETENT: NORMAL
BH CV LOWER VASCULAR LEFT COMMON FEMORAL COMPRESS: NORMAL
BH CV LOWER VASCULAR LEFT COMMON FEMORAL PHASIC: NORMAL
BH CV LOWER VASCULAR LEFT COMMON FEMORAL SPONT: NORMAL
BH CV LOWER VASCULAR LEFT DISTAL FEMORAL AUGMENT: NORMAL
BH CV LOWER VASCULAR LEFT DISTAL FEMORAL COMPETENT: NORMAL
BH CV LOWER VASCULAR LEFT DISTAL FEMORAL COMPRESS: NORMAL
BH CV LOWER VASCULAR LEFT DISTAL FEMORAL PHASIC: NORMAL
BH CV LOWER VASCULAR LEFT DISTAL FEMORAL SPONT: NORMAL
BH CV LOWER VASCULAR LEFT GASTRONEMIUS COMPRESS: NORMAL
BH CV LOWER VASCULAR LEFT GREATER SAPH AK COMPRESS: NORMAL
BH CV LOWER VASCULAR LEFT GREATER SAPH BK COMPRESS: NORMAL
BH CV LOWER VASCULAR LEFT LESSER SAPH COMPRESS: NORMAL
BH CV LOWER VASCULAR LEFT MID FEMORAL AUGMENT: NORMAL
BH CV LOWER VASCULAR LEFT MID FEMORAL COMPETENT: NORMAL
BH CV LOWER VASCULAR LEFT MID FEMORAL COMPRESS: NORMAL
BH CV LOWER VASCULAR LEFT MID FEMORAL PHASIC: NORMAL
BH CV LOWER VASCULAR LEFT MID FEMORAL SPONT: NORMAL
BH CV LOWER VASCULAR LEFT PERONEAL COMPRESS: NORMAL
BH CV LOWER VASCULAR LEFT POPLITEAL AUGMENT: NORMAL
BH CV LOWER VASCULAR LEFT POPLITEAL COMPETENT: NORMAL
BH CV LOWER VASCULAR LEFT POPLITEAL COMPRESS: NORMAL
BH CV LOWER VASCULAR LEFT POPLITEAL PHASIC: NORMAL
BH CV LOWER VASCULAR LEFT POPLITEAL SPONT: NORMAL
BH CV LOWER VASCULAR LEFT POSTERIOR TIBIAL COMPRESS: NORMAL
BH CV LOWER VASCULAR LEFT PROFUNDA FEMORAL AUGMENT: NORMAL
BH CV LOWER VASCULAR LEFT PROFUNDA FEMORAL COMPETENT: NORMAL
BH CV LOWER VASCULAR LEFT PROFUNDA FEMORAL COMPRESS: NORMAL
BH CV LOWER VASCULAR LEFT PROFUNDA FEMORAL PHASIC: NORMAL
BH CV LOWER VASCULAR LEFT PROFUNDA FEMORAL SPONT: NORMAL
BH CV LOWER VASCULAR LEFT PROXIMAL FEMORAL AUGMENT: NORMAL
BH CV LOWER VASCULAR LEFT PROXIMAL FEMORAL COMPETENT: NORMAL
BH CV LOWER VASCULAR LEFT PROXIMAL FEMORAL COMPRESS: NORMAL
BH CV LOWER VASCULAR LEFT PROXIMAL FEMORAL PHASIC: NORMAL
BH CV LOWER VASCULAR LEFT PROXIMAL FEMORAL SPONT: NORMAL
BH CV LOWER VASCULAR LEFT SAPHENOFEMORAL JUNCTION AUGMENT: NORMAL
BH CV LOWER VASCULAR LEFT SAPHENOFEMORAL JUNCTION COMPETENT: NORMAL
BH CV LOWER VASCULAR LEFT SAPHENOFEMORAL JUNCTION COMPRESS: NORMAL
BH CV LOWER VASCULAR LEFT SAPHENOFEMORAL JUNCTION PHASIC: NORMAL
BH CV LOWER VASCULAR LEFT SAPHENOFEMORAL JUNCTION SPONT: NORMAL
BH CV LOWER VASCULAR RIGHT COMMON FEMORAL AUGMENT: NORMAL
BH CV LOWER VASCULAR RIGHT COMMON FEMORAL COMPETENT: NORMAL
BH CV LOWER VASCULAR RIGHT COMMON FEMORAL COMPRESS: NORMAL
BH CV LOWER VASCULAR RIGHT COMMON FEMORAL PHASIC: NORMAL
BH CV LOWER VASCULAR RIGHT COMMON FEMORAL SPONT: NORMAL
MAXIMAL PREDICTED HEART RATE: 158 BPM
STRESS TARGET HR: 134 BPM

## 2022-09-13 PROCEDURE — 97110 THERAPEUTIC EXERCISES: CPT

## 2022-09-13 PROCEDURE — 97140 MANUAL THERAPY 1/> REGIONS: CPT

## 2022-09-13 NOTE — THERAPY TREATMENT NOTE
Outpatient Physical Therapy Ortho Treatment Note  [facility]     Patient Name: Eliz Velazquez  : 1960  MRN: [id number]  Today's Date: 2022      Visit Date: 2022    Visit Dx:    ICD-10-CM ICD-9-CM   1. Abdominal wall pain in left flank  R10.9 789.09   2. Rib pain on left side  R07.81 786.50   3. Acute left-sided thoracic back pain  M54.6 724.1       Patient Active Problem List   Diagnosis   • Seasonal allergies   • Primary hypertension   • Fatty liver   • Type 2 diabetes mellitus with hyperglycemia, without long-term current use of insulin (HCC)        Past Medical History:   Diagnosis Date   • Hypertension    • Iron deficiency anemia secondary to blood loss (chronic)     heavy menses        Past Surgical History:   Procedure Laterality Date   • TONSILLECTOMY     • TOTAL ABDOMINAL HYSTERECTOMY WITH SALPINGO OOPHORECTOMY     • TUBAL ABDOMINAL LIGATION                          PT Assessment/Plan     Row Name 22 1420          PT Assessment    Assessment Comments Overall some progress with pain and tolerance to activity. No increase in pain with light band resisted shoulder extension and rows.  -MM            PT Plan    PT Plan Comments Continue per plan of treatment with exercises and manual therapy.  -MM           User Key  (r) = Recorded By, (t) = Taken By, (c) = Cosigned By    Initials Name Provider Type    Chase Tobias, PT Physical Therapist                   OP Exercises     Row Name 22 1420 22 1415          Subjective Comments    Subjective Comments Client reports pain is improving overall. she is still unable to bend over for laundry.  -MM --            Subjective Pain    Able to rate subjective pain? yes  -MM --     Pre-Treatment Pain Level 5  -MM --     Post-Treatment Pain Level 5  -MM --            Total Minutes    19328 - PT Therapeutic Exercise Minutes 27  -MM --     83008 - PT Manual Therapy Minutes -- 15  -MM            Exercise 1    Exercise Name 1  UBE  -MM --     Time 1 4 min  -MM --            Exercise 2    Exercise Name 2 therapy bar: flexion, abduction  -MM --     Reps 2 15/15  -MM --            Exercise 3    Exercise Name 3 AROM: shoulder extension  -MM --     Reps 3 15  -MM --            Exercise 4    Exercise Name 4 ball roll up wall  -MM --     Reps 4 20  -MM --            Exercise 5    Exercise Name 5 standing circles w ball  -MM --     Reps 5 15 ea  -MM --            Exercise 6    Exercise Name 6 thoracic extension w sheet and seated w foam roll  -MM --     Reps 6 20/20  -MM --            Exercise 7    Exercise Name 7 side glides  -MM --     Reps 7 10 ea  -MM --            Exercise 8    Exercise Name 8 standing rotation w bar  -MM --     Reps 8 15/15  -MM --            Exercise 9    Exercise Name 9 band shoulder extension, rows  -MM --     Reps 9 15/15  -MM --     Additional Comments yellow  -MM --           User Key  (r) = Recorded By, (t) = Taken By, (c) = Cosigned By    Initials Name Provider Type    Chase Tobias, PT Physical Therapist                         Manual Rx (last 36 hours)     Manual Treatments     Row Name 09/13/22 1415             Total Minutes    60771 - PT Manual Therapy Minutes 15  -MM              Manual Rx 1    Manual Rx 1 Location Left thoracic region  -MM      Manual Rx 1 Type Provided soft tissue mobilization using ASTYM technique with client seated. Light to moderate pressure used with AStYM.  -MM      Manual Rx 1 Duration 15  -MM            User Key  (r) = Recorded By, (t) = Taken By, (c) = Cosigned By    Initials Name Provider Type    Chase Tobias, PT Physical Therapist                                   Time Calculation:   Start Time: 1415  Timed Charges  84484 - PT Therapeutic Exercise Minutes: 27  41859 - PT Manual Therapy Minutes: 15  Total Minutes  Timed Charges Total Minutes: 27   Total Minutes: 27  Therapy Charges for Today     Code Description Service Date Service Provider Modifiers Qty     22326899305  PT THER PROC EA 15 MIN 9/13/2022 Chase Mccormick, PT GP 2    [id number]  PT MANUAL THERAPY EA 15 MIN 9/13/2022 Chase Mccormick, PT GP 1                    Chase Mccormick, PT  9/13/2022

## 2022-09-16 ENCOUNTER — HOSPITAL ENCOUNTER (OUTPATIENT)
Dept: PHYSICAL THERAPY | Facility: HOSPITAL | Age: 62
Setting detail: THERAPIES SERIES
Discharge: HOME | End: 2022-09-16

## 2022-09-16 DIAGNOSIS — M54.6 ACUTE LEFT-SIDED THORACIC BACK PAIN: ICD-10-CM

## 2022-09-16 DIAGNOSIS — M25.512 ACUTE PAIN OF LEFT SHOULDER: ICD-10-CM

## 2022-09-16 DIAGNOSIS — R07.81 RIB PAIN ON LEFT SIDE: ICD-10-CM

## 2022-09-16 DIAGNOSIS — R10.9 ABDOMINAL WALL PAIN IN LEFT FLANK: Primary | ICD-10-CM

## 2022-09-16 PROCEDURE — 97110 THERAPEUTIC EXERCISES: CPT

## 2022-09-16 PROCEDURE — 97140 MANUAL THERAPY 1/> REGIONS: CPT

## 2022-09-16 NOTE — THERAPY TREATMENT NOTE
Outpatient Physical Therapy Ortho Treatment Note  [facility]     Patient Name: Eliz Velazquez  : 1960  MRN: [id number]  Today's Date: 2022      Visit Date: 2022    Visit Dx:    ICD-10-CM ICD-9-CM   1. Abdominal wall pain in left flank  R10.9 789.09   2. Rib pain on left side  R07.81 786.50   3. Acute left-sided thoracic back pain  M54.6 724.1   4. Acute pain of left shoulder  M25.512 719.41       Patient Active Problem List   Diagnosis   • Seasonal allergies   • Primary hypertension   • Fatty liver   • Type 2 diabetes mellitus with hyperglycemia, without long-term current use of insulin (HCC)        Past Medical History:   Diagnosis Date   • Hypertension    • Iron deficiency anemia secondary to blood loss (chronic)     heavy menses        Past Surgical History:   Procedure Laterality Date   • TONSILLECTOMY     • TOTAL ABDOMINAL HYSTERECTOMY WITH SALPINGO OOPHORECTOMY     • TUBAL ABDOMINAL LIGATION          PT Assessment/Plan     Row Name 22          PT Assessment    Assessment Comments Client continues to have some tenderness left lower thoracic region. Overall tolerance to activity and overall mobility are improving. Patient feels that she may be able to return to work next week.  -MM            PT Plan    PT Plan Comments Continue per plan of treatment. Check progress next visit. She is returning to doctor on Tuesday.  -MM           User Key  (r) = Recorded By, (t) = Taken By, (c) = Cosigned By    Initials Name Provider Type    Chase Tobias, PT Physical Therapist               OP Exercises     Row Name 22             Subjective Comments    Subjective Comments Client reports pain is improving. She reports pain today at 4/10.  -MM              Subjective Pain    Able to rate subjective pain? yes  -MM      Pre-Treatment Pain Level 4  -MM      Post-Treatment Pain Level 4  -MM              Total Minutes    90672 - PT Therapeutic Exercise Minutes 26  -MM       32210 - PT Manual Therapy Minutes 14  -MM              Exercise 1    Exercise Name 1 UBE  -MM      Time 1 4 min  -MM              Exercise 2    Exercise Name 2 therapy bar: flexion, abduction  -MM      Reps 2 15/15  -MM              Exercise 3    Exercise Name 3 --  -MM      Reps 3 --  -MM              Exercise 4    Exercise Name 4 ball roll up wall  -MM      Reps 4 20  -MM              Exercise 5    Exercise Name 5 standing circles w ball  -MM      Reps 5 15 ea  -MM              Exercise 6    Exercise Name 6 thoracic extension w sheet and seated w foam roll  -MM      Reps 6 20/20  -MM              Exercise 7    Exercise Name 7 side glide and reach  -MM      Reps 7 10 ea  -MM              Exercise 8    Exercise Name 8 standing rotation w bar  -MM      Reps 8 15/15  -MM              Exercise 9    Exercise Name 9 band: shoulder extension, rows, pull aparts  -MM      Reps 9 15/15/15  -MM      Additional Comments green/green/red  -MM            User Key  (r) = Recorded By, (t) = Taken By, (c) = Cosigned By    Initials Name Provider Type    Chase Tobias, PT Physical Therapist               Manual Rx (last 36 hours)     Manual Treatments     Row Name 09/16/22 0930             Total Minutes    17675 - PT Manual Therapy Minutes 14  -MM              Manual Rx 1    Manual Rx 1 Location Left thoracic region  -MM      Manual Rx 1 Type Provided soft tissue mobilization using ASTYM technique with client seated. Light to moderate pressure used with AStYM.  -MM      Manual Rx 1 Duration 14  -MM            User Key  (r) = Recorded By, (t) = Taken By, (c) = Cosigned By    Initials Name Provider Type    Chase Tobias, PT Physical Therapist              Time Calculation:   Start Time: 0930  Timed Charges  26607 - PT Therapeutic Exercise Minutes: 26  91345 - PT Manual Therapy Minutes: 14  Total Minutes  Timed Charges Total Minutes: 40   Total Minutes: 40  Therapy Charges for Today     Code Description Service Date Service  Provider Modifiers Qty    70215656095  PT THER PROC EA 15 MIN 9/16/2022 Chase Mccormick, PT GP 2    [id number]  PT MANUAL THERAPY EA 15 MIN 9/16/2022 Chase Mccormick, PT GP 1          Chase Mccormick, PT  9/16/2022

## 2022-09-19 ENCOUNTER — TELEPHONE (OUTPATIENT)
Dept: FAMILY MEDICINE CLINIC | Facility: CLINIC | Age: 62
End: 2022-09-19

## 2022-09-19 ENCOUNTER — HOSPITAL ENCOUNTER (OUTPATIENT)
Dept: PHYSICAL THERAPY | Facility: HOSPITAL | Age: 62
Setting detail: THERAPIES SERIES
Discharge: HOME | End: 2022-09-19

## 2022-09-19 DIAGNOSIS — R07.81 RIB PAIN ON LEFT SIDE: ICD-10-CM

## 2022-09-19 DIAGNOSIS — M54.6 ACUTE LEFT-SIDED THORACIC BACK PAIN: ICD-10-CM

## 2022-09-19 DIAGNOSIS — R10.9 ABDOMINAL WALL PAIN IN LEFT FLANK: Primary | ICD-10-CM

## 2022-09-19 PROCEDURE — 97110 THERAPEUTIC EXERCISES: CPT

## 2022-09-19 PROCEDURE — 97140 MANUAL THERAPY 1/> REGIONS: CPT

## 2022-09-19 NOTE — TELEPHONE ENCOUNTER
"I ordered an ankle-brachial index to evaluate her circulation for arterial blood flow problems.    \"Patient states she had a duplex done yesterday, wants to know why this is needed. \"    The ultrasound that she had was only looking for blood clots and did not evaluate the arterial blood flow for further evaluation of her leg pain she needs a different type of test.  "

## 2022-09-22 ENCOUNTER — HOSPITAL ENCOUNTER (OUTPATIENT)
Dept: PHYSICAL THERAPY | Facility: HOSPITAL | Age: 62
Setting detail: THERAPIES SERIES
Discharge: HOME | End: 2022-09-22

## 2022-09-22 DIAGNOSIS — R10.9 ABDOMINAL WALL PAIN IN LEFT FLANK: Primary | ICD-10-CM

## 2022-09-22 DIAGNOSIS — R07.81 RIB PAIN ON LEFT SIDE: ICD-10-CM

## 2022-09-22 DIAGNOSIS — M54.6 ACUTE LEFT-SIDED THORACIC BACK PAIN: ICD-10-CM

## 2022-09-22 PROCEDURE — 97110 THERAPEUTIC EXERCISES: CPT

## 2022-09-22 PROCEDURE — 97140 MANUAL THERAPY 1/> REGIONS: CPT

## 2022-09-22 NOTE — THERAPY TREATMENT NOTE
Outpatient Physical Therapy Ortho Treatment Note  [facility]     Patient Name: Eliz Velazquez  : 1960  MRN: [id number]  Today's Date: 2022      Visit Date: 2022    Visit Dx:    ICD-10-CM ICD-9-CM   1. Abdominal wall pain in left flank  R10.9 789.09   2. Rib pain on left side  R07.81 786.50   3. Acute left-sided thoracic back pain  M54.6 724.1       Patient Active Problem List   Diagnosis   • Seasonal allergies   • Primary hypertension   • Fatty liver   • Type 2 diabetes mellitus with hyperglycemia, without long-term current use of insulin (HCC)        Past Medical History:   Diagnosis Date   • Hypertension    • Iron deficiency anemia secondary to blood loss (chronic)     heavy menses        Past Surgical History:   Procedure Laterality Date   • TONSILLECTOMY     • TOTAL ABDOMINAL HYSTERECTOMY WITH SALPINGO OOPHORECTOMY     • TUBAL ABDOMINAL LIGATION            PT Assessment/Plan     Row Name 22 0845          PT Assessment    Assessment Comments Exercises were progressed and tolerated very well. She has some discomfort with trunk extension. She also continues with some tenderness left lower thoracic/lat region.  -MM            PT Plan    PT Plan Comments Continue per plan of treatment.  -MM           User Key  (r) = Recorded By, (t) = Taken By, (c) = Cosigned By    Initials Name Provider Type    Chase Tobias, PT Physical Therapist                 OP Exercises     Row Name 22 0845             Subjective Comments    Subjective Comments Client reports pain today at 6/10. Per MD She may return to work on 104 if they are able to accomodate lifting restrictions. lifting restriction for work is no more than 10 lbs.  -MM              Subjective Pain    Able to rate subjective pain? yes  -MM      Pre-Treatment Pain Level 6  -MM      Post-Treatment Pain Level 5  -MM              Total Minutes    33104 - PT Therapeutic Exercise Minutes 20  -MM      33450 - PT Manual Therapy  Minutes 10  -MM              Exercise 1    Exercise Name 1 UBE  -MM      Time 1 4 min  -MM              Exercise 2    Exercise Name 2 standing trunk rotation w CC  -MM      Reps 2 15/15  -MM      Additional Comments 2 pl  -MM              Exercise 3    Exercise Name 3 CC rows  -MM      Reps 3 15  -MM      Additional Comments 2 pl  -MM              Exercise 4    Exercise Name 4 CC punches SA  -MM      Reps 4 15  -MM      Additional Comments 2 pl  -MM              Exercise 5    Exercise Name 5 CC tricep push down  -MM      Reps 5 15  -MM              Exercise 6    Exercise Name 6 back extension over foam  -MM      Reps 6 15  -MM              Exercise 7    Exercise Name 7 back extension w sheet  -MM      Reps 7 20  -MM            User Key  (r) = Recorded By, (t) = Taken By, (c) = Cosigned By    Initials Name Provider Type    Chase Tobias, PT Physical Therapist              Manual Rx (last 36 hours)     Manual Treatments     Row Name 09/22/22 0845             Total Minutes    81873 - PT Manual Therapy Minutes 10  -MM              Manual Rx 1    Manual Rx 1 Location Left thoracic region  -MM      Manual Rx 1 Type Provided soft tissue mobilization using ASTYM technique with client seated. Light to moderate pressure used with AStYM.  -MM      Manual Rx 1 Duration 10  -MM            User Key  (r) = Recorded By, (t) = Taken By, (c) = Cosigned By    Initials Name Provider Type    Chase Tobias, PT Physical Therapist                Time Calculation:   Start Time: 0845  Timed Charges  55252 - PT Therapeutic Exercise Minutes: 20  43010 - PT Manual Therapy Minutes: 10  Total Minutes  Timed Charges Total Minutes: 30   Total Minutes: 30  Therapy Charges for Today     Code Description Service Date Service Provider Modifiers Qty    56708364168  PT THER PROC EA 15 MIN 9/22/2022 Chase Mccormick, PT GP 1    [id number]  PT MANUAL THERAPY EA 15 MIN 9/22/2022 Chase Mccormick, PT GP 1            Chase Mccormick  PT  9/22/2022

## 2022-09-26 ENCOUNTER — HOSPITAL ENCOUNTER (OUTPATIENT)
Dept: DIABETES SERVICES | Facility: HOSPITAL | Age: 62
Setting detail: RECURRING SERIES
Discharge: HOME | End: 2022-09-26

## 2022-09-26 PROCEDURE — G0108 DIAB MANAGE TRN  PER INDIV: HCPCS

## 2022-09-26 NOTE — CONSULTS
Diabetes Education  Assessment/Teaching    Patient Name:  Eliz Velazquez  YOB: 1960  MRN: [id number]  Admit Date:  9/26/2022      Assessment Date:  9/26/2022    Mrs. Velazquez attended a 120 minute diabetes education class with RN and RD. Please see Media tab for details. Thank you!                Electronically signed by:  Radha Blair RN  09/26/22 10:03 EDT

## 2022-09-27 ENCOUNTER — HOSPITAL ENCOUNTER (OUTPATIENT)
Dept: PHYSICAL THERAPY | Facility: HOSPITAL | Age: 62
Setting detail: THERAPIES SERIES
Discharge: HOME | End: 2022-09-27

## 2022-09-27 ENCOUNTER — TELEPHONE (OUTPATIENT)
Dept: FAMILY MEDICINE CLINIC | Facility: CLINIC | Age: 62
End: 2022-09-27

## 2022-09-27 DIAGNOSIS — M54.6 ACUTE LEFT-SIDED THORACIC BACK PAIN: ICD-10-CM

## 2022-09-27 DIAGNOSIS — R10.9 ABDOMINAL WALL PAIN IN LEFT FLANK: Primary | ICD-10-CM

## 2022-09-27 DIAGNOSIS — M25.512 ACUTE PAIN OF LEFT SHOULDER: ICD-10-CM

## 2022-09-27 DIAGNOSIS — R07.81 RIB PAIN ON LEFT SIDE: ICD-10-CM

## 2022-09-27 PROCEDURE — 97110 THERAPEUTIC EXERCISES: CPT

## 2022-09-27 PROCEDURE — 97140 MANUAL THERAPY 1/> REGIONS: CPT

## 2022-09-27 RX ORDER — LOSARTAN POTASSIUM 25 MG/1
25 TABLET ORAL DAILY
Qty: 30 TABLET | Refills: 5 | Status: SHIPPED | OUTPATIENT
Start: 2022-09-27 | End: 2022-11-21 | Stop reason: SDUPTHER

## 2022-09-27 NOTE — THERAPY TREATMENT NOTE
Outpatient Physical Therapy Ortho Treatment Note  [facility]     Patient Name: Eliz Velazquez  : 1960  MRN: [id number]  Today's Date: 2022      Visit Date: 2022    Visit Dx:    ICD-10-CM ICD-9-CM   1. Abdominal wall pain in left flank  R10.9 789.09   2. Rib pain on left side  R07.81 786.50   3. Acute left-sided thoracic back pain  M54.6 724.1   4. Acute pain of left shoulder  M25.512 719.41       Patient Active Problem List   Diagnosis   • Seasonal allergies   • Primary hypertension   • Fatty liver   • Type 2 diabetes mellitus with hyperglycemia, without long-term current use of insulin (HCC)        Past Medical History:   Diagnosis Date   • Hypertension    • Iron deficiency anemia secondary to blood loss (chronic)     heavy menses        Past Surgical History:   Procedure Laterality Date   • TONSILLECTOMY     • TOTAL ABDOMINAL HYSTERECTOMY WITH SALPINGO OOPHORECTOMY     • TUBAL ABDOMINAL LIGATION          PT Assessment/Plan     Row Name 22 1030          PT Assessment    Assessment Comments Client is making good progress. Pain was very mild today. She performed exercises in the clinic very well.  -MM            PT Plan    PT Plan Comments Continue per plan of treatment.  -MM           User Key  (r) = Recorded By, (t) = Taken By, (c) = Cosigned By    Initials Name Provider Type    Chase Tobias, PT Physical Therapist               OP Exercises     Row Name 22 1030             Subjective Comments    Subjective Comments Client reports her left lower leg feels sore and weak. She reports her back is improving.  -MM              Subjective Pain    Able to rate subjective pain? yes  -MM      Pre-Treatment Pain Level 3  -MM      Post-Treatment Pain Level 3  -MM              Total Minutes    51887 - PT Therapeutic Exercise Minutes 32  -MM      07086 - PT Manual Therapy Minutes 10  -MM              Exercise 1    Exercise Name 1 UBE  -MM      Time 1 4 min  -MM               Exercise 2    Exercise Name 2 standing trunk rotation w CC  -MM      Reps 2 15/15  -MM      Additional Comments 2 pl  -MM              Exercise 3    Exercise Name 3 CC rows  -MM      Reps 3 20  -MM      Additional Comments 3 pl  -MM              Exercise 4    Exercise Name 4 CC punches SA  -MM      Reps 4 15  -MM      Additional Comments 3 pl  -MM              Exercise 5    Exercise Name 5 CC tricep push down  -MM      Reps 5 15  -MM      Additional Comments 2 pl  -MM              Exercise 6    Exercise Name 6 back extension over foam  -MM      Reps 6 15  -MM              Exercise 7    Exercise Name 7 golfer lift  -MM      Reps 7 15/15  -MM      Additional Comments 4#  -MM              Exercise 8    Exercise Name 8 overhead press  -MM      Reps 8 15  -MM      Additional Comments 3#  -MM              Exercise 9    Exercise Name 9 seated ball roll outs  -MM      Reps 9 15  -MM            User Key  (r) = Recorded By, (t) = Taken By, (c) = Cosigned By    Initials Name Provider Type    Chase Tobias, PT Physical Therapist               Manual Rx (last 36 hours)     Manual Treatments     Row Name 09/27/22 1030             Total Minutes    90474 - PT Manual Therapy Minutes 10  -MM              Manual Rx 1    Manual Rx 1 Location Left thoracic region  -MM      Manual Rx 1 Type Provided soft tissue mobilization using ASTYM technique with client seated. Light to moderate pressure used with AStYM.  -MM      Manual Rx 1 Duration 10  -MM            User Key  (r) = Recorded By, (t) = Taken By, (c) = Cosigned By    Initials Name Provider Type    Chase Tobias, PT Physical Therapist                  Time Calculation:   Start Time: 1030  Timed Charges  50995 - PT Therapeutic Exercise Minutes: 32  67772 - PT Manual Therapy Minutes: 10  Total Minutes  Timed Charges Total Minutes: 42   Total Minutes: 42  Therapy Charges for Today     Code Description Service Date Service Provider Modifiers Qty    [id number]  PT THER  PROC EA 15 MIN 9/27/2022 Chase Mccormick, PT GP 2    [id number]  PT MANUAL THERAPY EA 15 MIN 9/27/2022 Chase Mccormick, PT GP 1          Chase Mccormick, PT  9/27/2022

## 2022-09-27 NOTE — TELEPHONE ENCOUNTER
Caller: Eliz Velazquez    Relationship: Self    Best call back number: [phone]    What medications are you currently taking:   Current Outpatient Medications on File Prior to Visit   Medication Sig Dispense Refill   • cetirizine (zyrTEC) 10 MG tablet Take 10 mg by mouth Daily.     • cyclobenzaprine (FLEXERIL) 10 MG tablet Take 1 tablet by mouth 3 (Three) Times a Day As Needed for Muscle Spasms for up to 15 doses. 15 tablet 0   • esomeprazole (nexIUM) 40 MG capsule Take 1 capsule by mouth Daily. 30 capsule 5   • HYDROcodone-acetaminophen (NORCO) 5-325 MG per tablet Take 1 tablet by mouth Every 6 (Six) Hours As Needed for Moderate Pain . 12 tablet 0   • Ibuprofen 200 MG capsule Take 600 mg by mouth.     • lisinopril (PRINIVIL,ZESTRIL) 20 MG tablet Take 1 tablet by mouth Daily. 90 tablet 1   • sitaGLIPtin-metFORMIN (Janumet)  MG per tablet Take 1 tablet by mouth 2 (Two) Times a Day With Meals. 60 tablet 3     No current facility-administered medications on file prior to visit.        What are your concerns: PT CALLED TO REQUEST TO HAVE HER BLOOD PRESSURE MEDICATION SWITCHED TO ANOTHER RX, PT NOT SURE THE NAME OF RX BUT STATED THAT IT IS GIVING HER A BAD COUGH

## 2022-09-28 ENCOUNTER — TELEPHONE (OUTPATIENT)
Dept: FAMILY MEDICINE CLINIC | Facility: CLINIC | Age: 62
End: 2022-09-28

## 2022-09-28 NOTE — TELEPHONE ENCOUNTER
Hub staff attempted to follow warm transfer process and was unsuccessful     Caller: Eliz Velazquez    Relationship to patient: Self    Best call back number: [phone]    Patient is needing: PATIENT HAD RETURNED A CALL TO GENERAL CALL BACK NUMBER. COULD NOT FIND ANY REASON FOR A CALL. PLEASE CALL TO DISCUSS THIS MISSED CALL.

## 2022-09-30 ENCOUNTER — HOSPITAL ENCOUNTER (OUTPATIENT)
Dept: PHYSICAL THERAPY | Facility: HOSPITAL | Age: 62
Setting detail: THERAPIES SERIES
Discharge: HOME | End: 2022-09-30

## 2022-09-30 DIAGNOSIS — R10.9 ABDOMINAL WALL PAIN IN LEFT FLANK: Primary | ICD-10-CM

## 2022-09-30 DIAGNOSIS — R07.81 RIB PAIN ON LEFT SIDE: ICD-10-CM

## 2022-09-30 DIAGNOSIS — M54.6 ACUTE LEFT-SIDED THORACIC BACK PAIN: ICD-10-CM

## 2022-09-30 DIAGNOSIS — M25.512 ACUTE PAIN OF LEFT SHOULDER: ICD-10-CM

## 2022-09-30 PROCEDURE — 97140 MANUAL THERAPY 1/> REGIONS: CPT

## 2022-09-30 PROCEDURE — 97110 THERAPEUTIC EXERCISES: CPT

## 2022-10-03 ENCOUNTER — HOSPITAL ENCOUNTER (OUTPATIENT)
Dept: PHYSICAL THERAPY | Facility: HOSPITAL | Age: 62
Setting detail: THERAPIES SERIES
Discharge: HOME | End: 2022-10-03

## 2022-10-03 ENCOUNTER — PATIENT OUTREACH (OUTPATIENT)
Dept: CASE MANAGEMENT | Facility: OTHER | Age: 62
End: 2022-10-03

## 2022-10-03 DIAGNOSIS — M25.512 ACUTE PAIN OF LEFT SHOULDER: ICD-10-CM

## 2022-10-03 DIAGNOSIS — M54.6 ACUTE LEFT-SIDED THORACIC BACK PAIN: ICD-10-CM

## 2022-10-03 DIAGNOSIS — R07.81 RIB PAIN ON LEFT SIDE: ICD-10-CM

## 2022-10-03 DIAGNOSIS — R10.9 ABDOMINAL WALL PAIN IN LEFT FLANK: Primary | ICD-10-CM

## 2022-10-03 PROCEDURE — 97140 MANUAL THERAPY 1/> REGIONS: CPT

## 2022-10-03 PROCEDURE — 97110 THERAPEUTIC EXERCISES: CPT

## 2022-10-03 NOTE — THERAPY TREATMENT NOTE
Outpatient Physical Therapy Ortho Treatment Note  [facility]     Patient Name: Eliz Velazquez  : 1960  MRN: [id number]  Today's Date: 10/3/2022      Visit Date: 10/03/2022    Visit Dx:    ICD-10-CM ICD-9-CM   1. Abdominal wall pain in left flank  R10.9 789.09   2. Rib pain on left side  R07.81 786.50   3. Acute left-sided thoracic back pain  M54.6 724.1   4. Acute pain of left shoulder  M25.512 719.41       Patient Active Problem List   Diagnosis   • Seasonal allergies   • Primary hypertension   • Fatty liver   • Type 2 diabetes mellitus with hyperglycemia, without long-term current use of insulin (HCC)        Past Medical History:   Diagnosis Date   • Hypertension    • Iron deficiency anemia secondary to blood loss (chronic)     heavy menses        Past Surgical History:   Procedure Laterality Date   • TONSILLECTOMY     • TOTAL ABDOMINAL HYSTERECTOMY WITH SALPINGO OOPHORECTOMY     • TUBAL ABDOMINAL LIGATION                          PT Assessment/Plan     Row Name 10/03/22 1400          PT Assessment    Assessment Comments Client continues to note some soreness after too much overhead activity. Overall she is making good progress. She performed exercises in the clinic without complaints.  -MM            PT Plan    PT Plan Comments Client to work on home program. she is supposed to return to work this week.  -MM           User Key  (r) = Recorded By, (t) = Taken By, (c) = Cosigned By    Initials Name Provider Type    Chase Tobias, PT Physical Therapist                   OP Exercises     Row Name 10/03/22 1345             Subjective Comments    Subjective Comments Client reports mild discomfort rearranging things overhead in her closet at home.  -MM              Subjective Pain    Able to rate subjective pain? yes  -MM      Pre-Treatment Pain Level 4  -MM      Post-Treatment Pain Level 4  -MM              Total Minutes    14370 - PT Therapeutic Exercise Minutes 30  -MM      92422 - PT Manual  Therapy Minutes 12  -MM              Exercise 1    Exercise Name 1 UBE  -MM      Time 1 4 min  -MM              Exercise 2    Exercise Name 2 standing trunk rotation w CC  -MM      Reps 2 15/15  -MM      Additional Comments 2 pl  -MM              Exercise 3    Exercise Name 3 CC rows  -MM      Reps 3 20  -MM      Additional Comments 2 pl  -MM              Exercise 4    Exercise Name 4 standing push ups  -MM      Reps 4 20  -MM      Additional Comments --  -MM              Exercise 5    Exercise Name 5 CC tricep push down  -MM      Reps 5 15  -MM      Additional Comments 2 pl  -MM              Exercise 6    Exercise Name 6 back extension over foam  -MM      Reps 6 15  -MM              Exercise 7    Exercise Name 7 deadlift 5# DB x2  -MM      Reps 7 15  -MM              Exercise 8    Exercise Name 8 overhead press  -MM      Reps 8 15  -MM      Additional Comments 3# Db  -MM              Exercise 9    Exercise Name 9 seated ball roll outs  -MM      Reps 9 15  -MM            User Key  (r) = Recorded By, (t) = Taken By, (c) = Cosigned By    Initials Name Provider Type    Chase Tobias, PT Physical Therapist                         Manual Rx (last 36 hours)     Manual Treatments     Row Name 10/03/22 1345             Total Minutes    97576 - PT Manual Therapy Minutes 12  -MM              Manual Rx 1    Manual Rx 1 Location Left thoracic region  -MM      Manual Rx 1 Type Provided soft tissue mobilization using ASTYM technique with client seated. Light to moderate pressure used with AStYM.  -MM      Manual Rx 1 Duration 12  -MM            User Key  (r) = Recorded By, (t) = Taken By, (c) = Cosigned By    Initials Name Provider Type    Chase Tobias, PT Physical Therapist                         Outcome Measure Options: Modified Oswestry, Quick DASH  Modified Oswestry  Modified Oswestry Score/Comments: 32%      Time Calculation:   Start Time: 1345  Timed Charges  61609 - PT Therapeutic Exercise Minutes:  30  20031 - PT Manual Therapy Minutes: 12  Total Minutes  Timed Charges Total Minutes: 42   Total Minutes: 42  Therapy Charges for Today     Code Description Service Date Service Provider Modifiers Qty    24660085148  PT THER PROC EA 15 MIN 10/3/2022 Chase Mccormick, PT GP 2    [id number]  PT MANUAL THERAPY EA 15 MIN 10/3/2022 Chase Mccormick, PT GP 1          PT G-Codes  Outcome Measure Options: Modified Oswestry, Quick DASH  Modified Oswestry Score/Comments: 32%         Chase Mccormick, PT  10/3/2022

## 2022-10-03 NOTE — OUTREACH NOTE
AMBULATORY CASE MANAGEMENT NOTE    Name and Relationship of Patient/Support Person: Caroline Eliz JAYNA - Self        Education Documentation  Monitoring Carbohydrate Intake, taught by Vanna Constantino RN at 10/3/2022  5:15 PM.  Learner: Patient  Readiness: Acceptance  Method: Explanation, Teach Back  Response: Verbalizes Understanding    Healthy Food Choices, taught by Vanna Constantino RN at 10/3/2022  5:15 PM.  Learner: Patient  Readiness: Acceptance  Method: Explanation, Teach Back  Response: Verbalizes Understanding    Hypoglycemia Identification and Treatment, taught by Vanna Constantino RN at 10/3/2022  5:15 PM.  Learner: Patient  Readiness: Acceptance  Method: Explanation, Teach Back  Response: Verbalizes Understanding    Hyperglycemia Identification and Treatment, taught by Vanna Constantino RN at 10/3/2022  5:15 PM.  Learner: Patient  Readiness: Acceptance  Method: Explanation, Teach Back  Response: Verbalizes Understanding    Blood Glucose Monitor Use, taught by Vanna Constantino RN at 10/3/2022  5:15 PM.  Learner: Patient  Readiness: Acceptance  Method: Explanation, Teach Back  Response: Verbalizes Understanding      Patient Outreach    Pt did enroll in [facility]. At this time not using glucometer to check bs, not able to tolerate. Did review s/s of high and low BS to be aware of and how to treat. Encouraged to address monitoring issue with PCP for alternate way such as CGM. Discussed food choices and reading labels for carb amounts about recommended amounts at each meal or snacks, A1c due to report in Nov. Pt is returning to work this week. No other questions at this time    VANNA MAE  Ambulatory Case Management    10/3/2022, 17:16 EDT

## 2022-10-17 ENCOUNTER — HOSPITAL ENCOUNTER (OUTPATIENT)
Dept: CARDIOLOGY | Facility: HOSPITAL | Age: 62
Discharge: HOME | End: 2022-10-17
Admitting: FAMILY MEDICINE

## 2022-10-17 VITALS — BODY MASS INDEX: 40.57 KG/M2 | HEIGHT: 63 IN | WEIGHT: 229 LBS

## 2022-10-17 DIAGNOSIS — M79.605 PAIN OF LEFT LOWER EXTREMITY: ICD-10-CM

## 2022-10-17 LAB
BH CV LOWER ARTERIAL LEFT ABI RATIO: 1.1
BH CV LOWER ARTERIAL LEFT DORSALIS PEDIS SYS MAX: 172
BH CV LOWER ARTERIAL LEFT GREAT TOE SYS MAX: 129
BH CV LOWER ARTERIAL LEFT POST TIBIAL SYS MAX: 185
BH CV LOWER ARTERIAL LEFT TBI RATIO: 0.79
BH CV LOWER ARTERIAL RIGHT ABI RATIO: 1
BH CV LOWER ARTERIAL RIGHT DORSALIS PEDIS SYS MAX: 168
BH CV LOWER ARTERIAL RIGHT GREAT TOE SYS MAX: 136
BH CV LOWER ARTERIAL RIGHT POST TIBIAL SYS MAX: 167
BH CV LOWER ARTERIAL RIGHT TBI RATIO: 0.83
MAXIMAL PREDICTED HEART RATE: 158 BPM
STRESS TARGET HR: 134 BPM
UPPER ARTERIAL LEFT ARM BRACHIAL SYS MAX: 163 MMHG
UPPER ARTERIAL RIGHT ARM BRACHIAL SYS MAX: 163 MMHG

## 2022-10-17 PROCEDURE — 93922 UPR/L XTREMITY ART 2 LEVELS: CPT | Performed by: INTERNAL MEDICINE

## 2022-10-17 PROCEDURE — 93922 UPR/L XTREMITY ART 2 LEVELS: CPT

## 2022-10-20 ENCOUNTER — HOSPITAL ENCOUNTER (OUTPATIENT)
Dept: DIABETES SERVICES | Facility: HOSPITAL | Age: 62
Setting detail: RECURRING SERIES
Discharge: HOME | End: 2022-10-20

## 2022-10-20 NOTE — CONSULTS
Patient attended the scheduled 30 minute follow up phone call diabetes education class with no charge. Please see media tab for assessment and notes if you use [employer]. If you are not an [employer] user a copy of patient's assessment and notes will be sent per routine. Thank you.

## 2022-11-08 ENCOUNTER — DOCUMENTATION (OUTPATIENT)
Dept: PHYSICAL THERAPY | Facility: HOSPITAL | Age: 62
End: 2022-11-08

## 2022-11-08 DIAGNOSIS — R10.9 ABDOMINAL WALL PAIN IN LEFT FLANK: Primary | ICD-10-CM

## 2022-11-08 DIAGNOSIS — M25.512 ACUTE PAIN OF LEFT SHOULDER: ICD-10-CM

## 2022-11-08 DIAGNOSIS — M54.6 ACUTE LEFT-SIDED THORACIC BACK PAIN: ICD-10-CM

## 2022-11-08 DIAGNOSIS — R07.81 RIB PAIN ON LEFT SIDE: ICD-10-CM

## 2022-11-08 NOTE — THERAPY DISCHARGE NOTE
Outpatient Physical Therapy Discharge Summary         Patient Name: Eliz Velazquez  : 1960  MRN: [id number]    Today's Date: 2022    Visit Dx:    ICD-10-CM ICD-9-CM   1. Abdominal wall pain in left flank  R10.9 789.09   2. Rib pain on left side  R07.81 786.50   3. Acute left-sided thoracic back pain  M54.6 724.1   4. Acute pain of left shoulder  M25.512 719.41     Client attended 11 PT visits for left rib/flank pain. At last visit 10/3/22 she was beginning to return to regular function and still experiencing some soreness with overhead activity. She made very good progress overall and was scheduled to return to work. PRognosis at IA is good.          Chase Mccormick, PT  2022

## 2022-11-21 ENCOUNTER — TELEPHONE (OUTPATIENT)
Dept: FAMILY MEDICINE CLINIC | Facility: CLINIC | Age: 62
End: 2022-11-21

## 2022-11-21 ENCOUNTER — OFFICE VISIT (OUTPATIENT)
Dept: FAMILY MEDICINE CLINIC | Facility: CLINIC | Age: 62
End: 2022-11-21

## 2022-11-21 VITALS
HEART RATE: 67 BPM | WEIGHT: 227.4 LBS | OXYGEN SATURATION: 97 % | SYSTOLIC BLOOD PRESSURE: 140 MMHG | DIASTOLIC BLOOD PRESSURE: 92 MMHG | BODY MASS INDEX: 40.29 KG/M2 | HEIGHT: 63 IN

## 2022-11-21 DIAGNOSIS — M79.605 CHRONIC PAIN OF LEFT LOWER EXTREMITY: ICD-10-CM

## 2022-11-21 DIAGNOSIS — I10 PRIMARY HYPERTENSION: ICD-10-CM

## 2022-11-21 DIAGNOSIS — G89.29 CHRONIC PAIN OF LEFT LOWER EXTREMITY: ICD-10-CM

## 2022-11-21 DIAGNOSIS — E11.65 TYPE 2 DIABETES MELLITUS WITH HYPERGLYCEMIA, WITHOUT LONG-TERM CURRENT USE OF INSULIN: Primary | ICD-10-CM

## 2022-11-21 LAB
EXPIRATION DATE: NORMAL
HBA1C MFR BLD: 8.1 %
Lab: NORMAL

## 2022-11-21 PROCEDURE — 99214 OFFICE O/P EST MOD 30 MIN: CPT | Performed by: FAMILY MEDICINE

## 2022-11-21 PROCEDURE — 83036 HEMOGLOBIN GLYCOSYLATED A1C: CPT | Performed by: FAMILY MEDICINE

## 2022-11-21 RX ORDER — PROCHLORPERAZINE 25 MG/1
1 SUPPOSITORY RECTAL DAILY
Qty: 1 EACH | Refills: 0 | Status: SHIPPED | OUTPATIENT
Start: 2022-11-21 | End: 2023-02-20

## 2022-11-21 RX ORDER — SITAGLIPTIN AND METFORMIN HYDROCHLORIDE 500; 50 MG/1; MG/1
1 TABLET, FILM COATED ORAL 2 TIMES DAILY WITH MEALS
Qty: 180 TABLET | Refills: 1 | Status: SHIPPED | OUTPATIENT
Start: 2022-11-21

## 2022-11-21 RX ORDER — LOSARTAN POTASSIUM 50 MG/1
50 TABLET ORAL DAILY
Qty: 90 TABLET | Refills: 1 | Status: SHIPPED | OUTPATIENT
Start: 2022-11-21

## 2022-11-21 RX ORDER — PROCHLORPERAZINE 25 MG/1
SUPPOSITORY RECTAL
Qty: 3 EACH | Refills: 4 | Status: SHIPPED | OUTPATIENT
Start: 2022-11-21 | End: 2023-02-20

## 2022-11-21 RX ORDER — PROCHLORPERAZINE 25 MG/1
1 SUPPOSITORY RECTAL
Qty: 1 EACH | Refills: 3 | Status: SHIPPED | OUTPATIENT
Start: 2022-11-21 | End: 2023-02-20

## 2022-11-21 RX ORDER — LOSARTAN POTASSIUM 50 MG/1
25 TABLET ORAL DAILY
Qty: 90 TABLET | Refills: 1 | Status: SHIPPED | OUTPATIENT
Start: 2022-11-21 | End: 2022-11-21 | Stop reason: SDUPTHER

## 2022-11-21 NOTE — PROGRESS NOTES
"Chief Complaint  Diabetes (8 week follow up) and Hypertension    Subjective        Eliz Velazquez presents to [facility]  Diabetes  She presents for her follow-up diabetic visit. She has type 2 diabetes mellitus. Current diabetic treatment includes oral agent (dual therapy). An ACE inhibitor/angiotensin II receptor blocker is being taken.   Hypertension  This is a chronic problem. The problem is uncontrolled. Past treatments include ACE inhibitors. Current antihypertension treatment includes angiotensin blockers.     Lots of changes. She loves bread. She has cut back to bread once a week. She is having Thanksgiving on Saturday instead and cutting back. She has goal of coming off diabetes medications. She makes hot water with jazzy and lemon.Trying not to guzman food.    No cough with stopping lisinopril. No missed doses of medication.     She still has left leg pain, cramping in back of knee and calf. Severe at night. She has h/o left leg pain and swelling many years ago. She had xrays and wore a shoe for 8 weeks.   No back pain. She still has pulled muscle in left abdomen.   She can't bend left foot, up/down stairs because of foot feeling heavy like flopping. Also foot pain at ball of left foot.     Objective   Vital Signs:  /92   Pulse 67   Ht 160 cm (62.99\")   Wt 103 kg (227 lb 6.4 oz)   SpO2 97%   BMI 40.29 kg/m²   Estimated body mass index is 40.29 kg/m² as calculated from the following:    Height as of this encounter: 160 cm (62.99\").    Weight as of this encounter: 103 kg (227 lb 6.4 oz).          Physical Exam  Vitals reviewed.   Constitutional:       General: She is not in acute distress.     Appearance: She is obese. She is not ill-appearing.   Cardiovascular:      Rate and Rhythm: Normal rate and regular rhythm.   Pulmonary:      Effort: Pulmonary effort is normal.      Breath sounds: Normal breath sounds.   Musculoskeletal:      Left lower leg: No edema.      " Comments: Left lower leg nontender to palpation.   Neurological:      Mental Status: She is alert.        Result Review :  The following data was reviewed by: Sunitha Tobin MD on 11/21/2022:  Common labs    Common Labs 8/5/22 8/5/22 8/5/22 8/10/22 11/21/22    1128 1240 1244     Glucose  368 (A)      BUN  11      Creatinine  0.68 0.50 (A)     Sodium  134 (A)      Potassium  4.1      Chloride  98      Calcium  8.6      Albumin  3.50      Total Bilirubin  0.4      Alkaline Phosphatase  135 (A)      AST (SGOT)  21      ALT (SGPT)  32      WBC 8.86       Hemoglobin 14.7       Hematocrit 42.8       Platelets 230       Hemoglobin A1C    11.1 8.1   (A) Abnormal value       Comments are available for some flowsheets but are not being displayed.           A1C Last 3 Results    HGBA1C Last 3 Results 8/10/22 11/21/22   Hemoglobin A1C 11.1 8.1                    Duplex Venous Lower Extremity - Left CAR (09/12/2022 11:26)  Doppler Ankle Brachial Index Single Level CAR (10/17/2022 13:54)        Assessment and Plan   Diagnoses and all orders for this visit:    1. Type 2 diabetes mellitus with hyperglycemia, without long-term current use of insulin (HCC) (Primary)  -     POC Glycosylated Hemoglobin (Hb A1C)  -     Discontinue: losartan (COZAAR) 50 MG tablet; Take 0.5 tablets by mouth Daily.  Dispense: 90 tablet; Refill: 1  -     sitaGLIPtin-metFORMIN (Janumet)  MG per tablet; Take 1 tablet by mouth 2 (Two) Times a Day With Meals.  Dispense: 180 tablet; Refill: 1  -     losartan (COZAAR) 50 MG tablet; Take 1 tablet by mouth Daily.  Dispense: 90 tablet; Refill: 1  -     Continuous Blood Gluc Sensor (Dexcom G6 Sensor); Every 10 (Ten) Days.  Dispense: 3 each; Refill: 4  -     Continuous Blood Gluc  (Dexcom G6 ) device; 1 each Daily.  Dispense: 1 each; Refill: 0  -     Continuous Blood Gluc Transmit (Dexcom G6 Transmitter) misc; 1 each Every 3 (Three) Months.  Dispense: 1 each; Refill: 3  A1c improved  dramatically through dietary changes and initiation of Janumet.  Patient has a future goal of coming off medication.  At this point continue current dosing of Janumet and continue to work on weight loss.  Recheck in 3 months.  She would like to try CGM instead of traditional glucometer.  2. Primary hypertension  -     losartan (COZAAR) 50 MG tablet; Take 1 tablet by mouth Daily.  Dispense: 90 tablet; Refill: 1  Uncontrolled.  Increase losartan to 50 mg.  3. Chronic pain of left lower extremity  -     EMG & Nerve Conduction Test; Future  Ultrasound arterial and vein both normal.  Further evaluation if potential neuropathy.           Follow Up   Return in about 3 months (around 2/21/2023) for Office visit diabetes, HTN .  Patient was given instructions and counseling regarding her condition or for health maintenance advice. Please see specific information pulled into the AVS if appropriate.     Electronically signed by Sunitha Tobin MD, 11/21/22, 11:04 AM EST.

## 2022-11-21 NOTE — TELEPHONE ENCOUNTER
"Patient would like to know if she may have the Dexcom glucometer rather than the \"old fashion\" glucometer that requires her to prick her finger multiple times a day. Please advise.  "

## 2023-01-13 ENCOUNTER — PATIENT OUTREACH (OUTPATIENT)
Dept: CASE MANAGEMENT | Facility: OTHER | Age: 63
End: 2023-01-13
Payer: COMMERCIAL

## 2023-01-13 NOTE — OUTREACH NOTE
AMBULATORY CASE MANAGEMENT NOTE    Name and Relationship of Patient/Support Person: Eliz Velazquez - Self        Education Documentation  Healthy Food Choices, taught by Vanna Constantino, RN at 1/13/2023  2:50 PM.  Learner: Patient  Readiness: Acceptance  Method: Explanation  Response: Verbalizes Understanding    Blood Glucose Monitor Use, taught by Vanna Constantino, RN at 1/13/2023  2:50 PM.  Learner: Patient  Readiness: Acceptance  Method: Explanation  Response: Verbalizes Understanding    A1C Goal, taught by Vanna Constantino, RN at 1/13/2023  2:50 PM.  Learner: Patient  Readiness: Acceptance  Method: Explanation  Response: Verbalizes Understanding      Patient Outreach    Call to patient for f/u. She was limited on time so reviewed a1c goal. Currently at 8.1. Next labs due next month at appt with MD. Hopes to be closer to 7. States she has made big changes to eating habits after attending diabetes education classes. States she has not noticed wt loss so much as wt changes due to way her clothes fit.  She is active at work and while goal is to walk more usually gets in about 20 min daily. Encouraged patient to continue to stay active and find small ways to make changes. She is not consistent with checking BS because of finger sticks. She tried to get CHM but not covered by insurance at this time. Encouraged use and reviewed a1c goal. No SDOH deficits noted at this time. Will f/u after MD appt in Feb    VANNA MAE  Ambulatory Case Management    1/13/2023, 15:03 EST

## 2023-02-13 ENCOUNTER — TRANSCRIBE ORDERS (OUTPATIENT)
Dept: MAMMOGRAPHY | Facility: HOSPITAL | Age: 63
End: 2023-02-13
Payer: COMMERCIAL

## 2023-02-13 ENCOUNTER — HOSPITAL ENCOUNTER (OUTPATIENT)
Dept: MAMMOGRAPHY | Facility: HOSPITAL | Age: 63
Discharge: HOME | End: 2023-02-13
Admitting: FAMILY MEDICINE
Payer: COMMERCIAL

## 2023-02-13 DIAGNOSIS — R92.8 ABNORMAL MAMMOGRAM: ICD-10-CM

## 2023-02-13 DIAGNOSIS — R92.8 ABNORMAL MAMMOGRAM: Primary | ICD-10-CM

## 2023-02-13 PROCEDURE — 77065 DX MAMMO INCL CAD UNI: CPT | Performed by: RADIOLOGY

## 2023-02-13 PROCEDURE — 77065 DX MAMMO INCL CAD UNI: CPT

## 2023-02-20 ENCOUNTER — OFFICE VISIT (OUTPATIENT)
Dept: FAMILY MEDICINE CLINIC | Facility: CLINIC | Age: 63
End: 2023-02-20
Payer: COMMERCIAL

## 2023-02-20 VITALS
SYSTOLIC BLOOD PRESSURE: 124 MMHG | OXYGEN SATURATION: 93 % | HEIGHT: 63 IN | HEART RATE: 76 BPM | DIASTOLIC BLOOD PRESSURE: 74 MMHG | BODY MASS INDEX: 40.15 KG/M2 | WEIGHT: 226.6 LBS

## 2023-02-20 DIAGNOSIS — B35.1 NAIL FUNGUS: ICD-10-CM

## 2023-02-20 DIAGNOSIS — E11.65 TYPE 2 DIABETES MELLITUS WITH HYPERGLYCEMIA, WITHOUT LONG-TERM CURRENT USE OF INSULIN: Primary | ICD-10-CM

## 2023-02-20 DIAGNOSIS — M17.11 PRIMARY OSTEOARTHRITIS OF RIGHT KNEE: ICD-10-CM

## 2023-02-20 DIAGNOSIS — I10 PRIMARY HYPERTENSION: ICD-10-CM

## 2023-02-20 LAB
EXPIRATION DATE: NORMAL
HBA1C MFR BLD: 8 %
Lab: NORMAL
POC CREATININE URINE: 8.8
POC MICROALBUMIN URINE: 30

## 2023-02-20 PROCEDURE — 83036 HEMOGLOBIN GLYCOSYLATED A1C: CPT | Performed by: FAMILY MEDICINE

## 2023-02-20 PROCEDURE — 82044 UR ALBUMIN SEMIQUANTITATIVE: CPT | Performed by: FAMILY MEDICINE

## 2023-02-20 PROCEDURE — 99214 OFFICE O/P EST MOD 30 MIN: CPT | Performed by: FAMILY MEDICINE

## 2023-02-20 RX ORDER — TERBINAFINE HYDROCHLORIDE 250 MG/1
250 TABLET ORAL DAILY
Qty: 30 TABLET | Refills: 0 | Status: SHIPPED | OUTPATIENT
Start: 2023-02-20 | End: 2023-03-22

## 2023-02-20 RX ORDER — CELECOXIB 200 MG/1
200 CAPSULE ORAL DAILY PRN
Qty: 90 CAPSULE | Refills: 3 | Status: SHIPPED | OUTPATIENT
Start: 2023-02-20 | End: 2023-04-07 | Stop reason: SDUPTHER

## 2023-02-20 NOTE — PROGRESS NOTES
"Chief Complaint  Diabetes, Hypertension, and Knee Pain (right)    Subjective        Eliz Velazquez presents to [facility]  Diabetes  She presents for her follow-up diabetic visit. She has type 2 diabetes mellitus. Current diabetic treatment includes oral agent (dual therapy). An ACE inhibitor/angiotensin II receptor blocker is being taken.   Hypertension  This is a chronic problem. Current antihypertension treatment includes angiotensin blockers.     No home blood glucose monitoring, she won't poke herself and CGM too expensive. Home BP readings only 2 high readings. SBP elevated 5-9 points.     Right knee pain worse over past month. Tries to bend knee in bed, but excruciating pain. Sleeps sideways stretched out. Last night used Voltaren gel, she has swelling this morning.     She has used spray and nail polish for toe nail fungus. She used home remedy of vinegar.     Objective   Vital Signs:  /74   Pulse 76   Ht 160 cm (62.99\")   Wt 103 kg (226 lb 9.6 oz)   SpO2 93%   BMI 40.15 kg/m²   Estimated body mass index is 40.15 kg/m² as calculated from the following:    Height as of this encounter: 160 cm (62.99\").    Weight as of this encounter: 103 kg (226 lb 9.6 oz).             Physical Exam  Vitals reviewed.   Constitutional:       General: She is not in acute distress.     Appearance: She is obese. She is not ill-appearing.   Cardiovascular:      Rate and Rhythm: Normal rate and regular rhythm.   Pulmonary:      Effort: Pulmonary effort is normal.      Breath sounds: Normal breath sounds.   Musculoskeletal:      Right knee: Effusion present. No erythema or ecchymosis. Decreased range of motion (pain with flexion).   Feet:      Right foot:      Toenail Condition: Right toenails are abnormally thick. Fungal disease present.  Neurological:      Mental Status: She is alert.      Gait: Gait abnormal ( antalgic).        Result Review :  The following data was reviewed by: Sunitha DAVIS" MD Crista on 02/20/2023:  Common labs    Common Labs 8/10/22 11/21/22 2/20/23   Hemoglobin A1C 11.1 8.1 8.0           A1C Last 3 Results    HGBA1C Last 3 Results 8/10/22 11/21/22 2/20/23   Hemoglobin A1C 11.1 8.1 8.0                   XR Knee 3 View Right (12/18/2020 12:44)        Results for orders placed or performed in visit on 02/20/23   POC Glycosylated Hemoglobin (Hb A1C)    Specimen: Blood   Result Value Ref Range    Hemoglobin A1C 8.0 %    Lot Number [id number]     Expiration Date 11/21/2024    POC Microalbumin    Specimen: Urine   Result Value Ref Range    Microalbumin, Urine 30     Creatinine, Urine 8.8           Assessment and Plan   Diagnoses and all orders for this visit:    1. Type 2 diabetes mellitus with hyperglycemia, without long-term current use of insulin (HCC) (Primary)  -     POC Glycosylated Hemoglobin (Hb A1C)  -     POC Microalbumin  -     empagliflozin (Jardiance) 25 MG tablet tablet; Take 1 tablet by mouth Daily.  Dispense: 90 tablet; Refill: 1  Uncontrolled.  Goal A1c less than 6.5.  Continue Janumet twice a day.  Add Jardiance.  Discussed side effects to monitor for and make me aware if changes.  She is not doing home glucose monitoring.  Recheck in 3 months.  2. Primary hypertension  Improved with increased dose of losartan.  Continue losartan 50 mg.  3. Primary osteoarthritis of right knee  -     Ambulatory Referral to Orthopedic Surgery  -     celecoxib (CeleBREX) 200 MG capsule; Take 1 capsule by mouth Daily As Needed for Moderate Pain.  Dispense: 90 capsule; Refill: 3  New.  Start Celebrex for pain relief and advised of side effects and not to use with any other NSAIDs.  Refer to orthopedics for further care.  4. Nail fungus  -     terbinafine (lamiSIL) 250 MG tablet; Take 1 tablet by mouth Daily for 30 days.  Dispense: 30 tablet; Refill: 0  -     Comprehensive Metabolic Panel; Future  New.  Discussed with patient risks of side effects including liver disease.  She has fatty  liver disease.  Advised I will percent a 1 month prescription of the medication and then she would need to have labs before I can refill.           Follow Up   Return in about 3 months (around 5/20/2023) for Office visit diabetes.  Patient was given instructions and counseling regarding her condition or for health maintenance advice. Please see specific information pulled into the AVS if appropriate.     Electronically signed by Sunitha Tobin MD, 02/20/23, 8:18 AM EST.

## 2023-03-23 ENCOUNTER — PATIENT OUTREACH (OUTPATIENT)
Dept: CASE MANAGEMENT | Facility: OTHER | Age: 63
End: 2023-03-23
Payer: COMMERCIAL

## 2023-03-23 NOTE — OUTREACH NOTE
AMBULATORY CASE MANAGEMENT NOTE    Name and Relationship of Patient/Support Person: Caroline Eliz V - Self        Education Documentation  Monitoring Carbohydrate Intake, taught by Vanna Constantino, RN at 3/23/2023  2:08 PM.  Learner: Patient  Readiness: Acceptance  Method: Explanation  Response: Verbalizes Understanding    Healthy Food Choices, taught by Vanna Constantino, RN at 3/23/2023  2:08 PM.  Learner: Patient  Readiness: Acceptance  Method: Explanation  Response: Verbalizes Understanding    Blood Glucose Goal, taught by Vanna Constantino, RN at 3/23/2023  2:08 PM.  Learner: Patient  Readiness: Acceptance  Method: Explanation  Response: Verbalizes Understanding    A1C Goal, taught by Vanna Constantino, RN at 3/23/2023  2:08 PM.  Learner: Patient  Readiness: Acceptance  Method: Explanation  Response: Verbalizes Understanding      Patient Outreach    Call to patient for update. Patient reports BS 98-98 at most checks. She last a1c had lwered from 11.1 to 8.0. Due for labs again in May. She states MD added Jardiance 25mg daily to meds and it has helped with weight loss as well. Patient states she has lost about 13lbs so far. She is reading labels to watch for carb content as well as sodium. Trying to stay more active. Encouraged to continue to monitor and read labels and stay within guidelines. Meal plan to avoid last minute high fat/carb take out meals. No other concerns at this time. Will f/u after new labs    VANNA MAE  Ambulatory Case Management    3/23/2023, 14:10 EDT

## 2023-04-07 ENCOUNTER — TELEPHONE (OUTPATIENT)
Dept: FAMILY MEDICINE CLINIC | Facility: CLINIC | Age: 63
End: 2023-04-07

## 2023-04-07 DIAGNOSIS — M17.11 PRIMARY OSTEOARTHRITIS OF RIGHT KNEE: ICD-10-CM

## 2023-04-07 RX ORDER — CELECOXIB 200 MG/1
200 CAPSULE ORAL 2 TIMES DAILY
Qty: 180 CAPSULE | Refills: 3 | Status: SHIPPED | OUTPATIENT
Start: 2023-04-07

## 2023-04-07 NOTE — TELEPHONE ENCOUNTER
Caller: Eliz Velazquez    Relationship: Self    Best call back number: [phone]    What is the best time to reach you: ANYTIME     Who are you requesting to speak with (clinical staff, provider,  specific staff member): CLINICAL STAFF     What was the call regarding: PATIENT IS CALLING TO SEE ABOUT GETTING A CHANGE TO HER PRESCRIPTION FOR CELECOXIB. SHE SAYS THAT SHE HAS BEEN TAKING 2 PILLS A DAY TO HELP WITH THE PAIN AS SHE SLEEPS. SHE HAS RUN OUT OF THE MEDICATION AND IS NEEDING A REFILL SENT TO THE PHARMACY UNDER A NEW PRESCRIPTION.     Do you require a callback: YES

## 2023-05-17 ENCOUNTER — LAB (OUTPATIENT)
Dept: LAB | Facility: HOSPITAL | Age: 63
End: 2023-05-17
Payer: COMMERCIAL

## 2023-05-17 ENCOUNTER — OFFICE VISIT (OUTPATIENT)
Dept: FAMILY MEDICINE CLINIC | Facility: CLINIC | Age: 63
End: 2023-05-17
Payer: COMMERCIAL

## 2023-05-17 VITALS
DIASTOLIC BLOOD PRESSURE: 94 MMHG | OXYGEN SATURATION: 93 % | BODY MASS INDEX: 38.45 KG/M2 | HEIGHT: 63 IN | HEART RATE: 63 BPM | SYSTOLIC BLOOD PRESSURE: 130 MMHG | WEIGHT: 217 LBS

## 2023-05-17 DIAGNOSIS — B35.1 NAIL FUNGUS: ICD-10-CM

## 2023-05-17 DIAGNOSIS — K76.0 FATTY LIVER: ICD-10-CM

## 2023-05-17 DIAGNOSIS — E11.65 TYPE 2 DIABETES MELLITUS WITH HYPERGLYCEMIA, WITHOUT LONG-TERM CURRENT USE OF INSULIN: ICD-10-CM

## 2023-05-17 DIAGNOSIS — I10 PRIMARY HYPERTENSION: ICD-10-CM

## 2023-05-17 DIAGNOSIS — Z00.00 WELL ADULT EXAM: ICD-10-CM

## 2023-05-17 DIAGNOSIS — Z00.00 WELL ADULT EXAM: Primary | ICD-10-CM

## 2023-05-17 DIAGNOSIS — M25.562 CHRONIC PAIN OF LEFT KNEE: ICD-10-CM

## 2023-05-17 DIAGNOSIS — G89.29 CHRONIC PAIN OF LEFT KNEE: ICD-10-CM

## 2023-05-17 DIAGNOSIS — Z23 NEED FOR VACCINATION: ICD-10-CM

## 2023-05-17 LAB
ALBUMIN SERPL-MCNC: 4.4 G/DL (ref 3.5–5.2)
ALBUMIN/GLOB SERPL: 1.4 G/DL
ALP SERPL-CCNC: 113 U/L (ref 39–117)
ALT SERPL W P-5'-P-CCNC: 23 U/L (ref 1–33)
ANION GAP SERPL CALCULATED.3IONS-SCNC: 8 MMOL/L (ref 5–15)
AST SERPL-CCNC: 21 U/L (ref 1–32)
BILIRUB SERPL-MCNC: 0.3 MG/DL (ref 0–1.2)
BUN SERPL-MCNC: 21 MG/DL (ref 8–23)
BUN/CREAT SERPL: 27.3 (ref 7–25)
CALCIUM SPEC-SCNC: 9.1 MG/DL (ref 8.6–10.5)
CHLORIDE SERPL-SCNC: 105 MMOL/L (ref 98–107)
CHOLEST SERPL-MCNC: 196 MG/DL (ref 0–200)
CO2 SERPL-SCNC: 28 MMOL/L (ref 22–29)
CREAT SERPL-MCNC: 0.77 MG/DL (ref 0.57–1)
DEPRECATED RDW RBC AUTO: 39.5 FL (ref 37–54)
EGFRCR SERPLBLD CKD-EPI 2021: 86.8 ML/MIN/1.73
ERYTHROCYTE [DISTWIDTH] IN BLOOD BY AUTOMATED COUNT: 12.1 % (ref 12.3–15.4)
GLOBULIN UR ELPH-MCNC: 3.1 GM/DL
GLUCOSE SERPL-MCNC: 115 MG/DL (ref 65–99)
HBA1C MFR BLD: 6.7 % (ref 4.8–5.6)
HCT VFR BLD AUTO: 42.9 % (ref 34–46.6)
HDLC SERPL-MCNC: 40 MG/DL (ref 40–60)
HGB BLD-MCNC: 14.2 G/DL (ref 12–15.9)
LDLC SERPL CALC-MCNC: 138 MG/DL (ref 0–100)
LDLC/HDLC SERPL: 3.4 {RATIO}
MCH RBC QN AUTO: 29.5 PG (ref 26.6–33)
MCHC RBC AUTO-ENTMCNC: 33.1 G/DL (ref 31.5–35.7)
MCV RBC AUTO: 89 FL (ref 79–97)
PLATELET # BLD AUTO: 230 10*3/MM3 (ref 140–450)
PMV BLD AUTO: 13 FL (ref 6–12)
POTASSIUM SERPL-SCNC: 4.6 MMOL/L (ref 3.5–5.2)
PROT SERPL-MCNC: 7.5 G/DL (ref 6–8.5)
RBC # BLD AUTO: 4.82 10*6/MM3 (ref 3.77–5.28)
SODIUM SERPL-SCNC: 141 MMOL/L (ref 136–145)
TRIGL SERPL-MCNC: 100 MG/DL (ref 0–150)
TSH SERPL DL<=0.05 MIU/L-ACNC: 2.99 UIU/ML (ref 0.27–4.2)
VLDLC SERPL-MCNC: 18 MG/DL (ref 5–40)
WBC NRBC COR # BLD: 7.33 10*3/MM3 (ref 3.4–10.8)

## 2023-05-17 PROCEDURE — 83036 HEMOGLOBIN GLYCOSYLATED A1C: CPT

## 2023-05-17 PROCEDURE — 80050 GENERAL HEALTH PANEL: CPT

## 2023-05-17 PROCEDURE — 80061 LIPID PANEL: CPT

## 2023-05-17 RX ORDER — SITAGLIPTIN AND METFORMIN HYDROCHLORIDE 500; 50 MG/1; MG/1
1 TABLET, FILM COATED ORAL 2 TIMES DAILY WITH MEALS
Qty: 180 TABLET | Refills: 1 | Status: SHIPPED | OUTPATIENT
Start: 2023-05-17

## 2023-05-17 RX ORDER — LOSARTAN POTASSIUM 100 MG/1
100 TABLET ORAL DAILY
Qty: 90 TABLET | Refills: 1 | Status: SHIPPED | OUTPATIENT
Start: 2023-05-17

## 2023-05-17 NOTE — PROGRESS NOTES
"Chief Complaint  Annual Exam (Fasting for labs)    Subjective          Eliz Velazquez presents to [facility] for   History of Present Illness    She ate tejeda last night first time in a long while. She cut back on stuff. She has less desserts and sugar regularly. She had 3 bites of toffee bun. Sugar free cookies for coffee at home. Drinking lots of water.     She is scheduled for eye exam.     Last dental exam in November.     Pill for nail infection has cleared 5th toe and some of right great toenail. Left side still a problem.     She can't use her left leg like her right leg. She doesn't walk normally. She has limping. Pain behind left knee.     She got sick with Shingles vaccine and declined booster.     At night she sometimes has a choking sensation.  She feels a nerve type pain in the side of her neck.  She open and closes her jaw and do stretching exercises to help relieve the discomfort.        Objective   Vital Signs:   Vitals:    05/17/23 0803   BP: 130/94   Pulse: 63   SpO2: 93%   Weight: 98.4 kg (217 lb)   Height: 160 cm (63\")   PainSc: 10-Worst pain ever   PainLoc: Leg  Comment: left     Body mass index is 38.44 kg/m².    Class 2 Severe Obesity (BMI >=35 and <=39.9). Obesity-related health conditions include the following: hypertension, diabetes mellitus and osteoarthritis. Obesity is improving with lifestyle modifications. BMI is is above average; BMI management plan is completed. We discussed low calorie, low carb based diet program.          Physical Exam  Constitutional:       General: She is not in acute distress.     Appearance: She is obese.   HENT:      Right Ear: Tympanic membrane and ear canal normal.      Left Ear: Tympanic membrane and ear canal normal.      Nose: No congestion or rhinorrhea.      Mouth/Throat:      Mouth: Mucous membranes are moist.      Pharynx: No oropharyngeal exudate or posterior oropharyngeal erythema.   Eyes:      General:         " Right eye: No discharge.         Left eye: No discharge.      Conjunctiva/sclera: Conjunctivae normal.   Neck:      Thyroid: No thyromegaly.   Cardiovascular:      Rate and Rhythm: Normal rate and regular rhythm.      Pulses:           Dorsalis pedis pulses are 2+ on the right side and 2+ on the left side.        Posterior tibial pulses are 2+ on the right side and 2+ on the left side.   Pulmonary:      Effort: Pulmonary effort is normal.      Breath sounds: Normal breath sounds.   Abdominal:      Palpations: Abdomen is soft. There is no hepatomegaly.      Tenderness: There is no abdominal tenderness.   Musculoskeletal:      Cervical back: Neck supple.   Feet:      Right foot:      Protective Sensation: 6 sites tested. 6 sites sensed.      Skin integrity: Skin integrity normal.      Toenail Condition: Right toenails are abnormally thick. Fungal disease present.     Left foot:      Protective Sensation: 6 sites tested. 6 sites sensed.      Skin integrity: Skin integrity normal.      Toenail Condition: Left toenails are abnormally thick. Fungal disease present.     Comments: Diabetic Foot Exam Performed  Monofilament Test Performed        Lymphadenopathy:      Head:      Right side of head: No submandibular, preauricular or posterior auricular adenopathy.      Left side of head: No submandibular, preauricular or posterior auricular adenopathy.      Cervical: No cervical adenopathy.   Skin:     General: Skin is warm.   Neurological:      Mental Status: She is alert and oriented to person, place, and time.   Psychiatric:         Mood and Affect: Mood normal.         Behavior: Behavior normal.         Thought Content: Thought content normal.         Judgment: Judgment normal.        Result Review :   The following data was reviewed by: Sunitha Tobin MD on 05/17/2023:  Common labs        8/10/2022    09:32 11/21/2022    08:47 2/20/2023    08:10   Common Labs   Hemoglobin A1C 11.1   8.1   8.0                 Immunization History   Administered Date(s) Administered   • COVID-19 (PFIZER) Purple Cap Monovalent 12/21/2020, 01/11/2021, 10/08/2021   • Covid-19 (Pfizer) Gray Cap Monovalent 05/13/2022   • Hepatitis A 04/21/2019, 05/13/2022   • Pneumococcal Conjugate 20-Valent (PCV20) 05/17/2023   • Shingrix 05/18/2022   • Tdap 05/13/2022       Health Maintenance   Topic Date Due   • HEPATITIS C SCREENING  Never done   • DIABETIC EYE EXAM  Never done   • ZOSTER VACCINE (2 of 2) 07/13/2022   • COVID-19 Vaccine (5 - Booster for Pfizer series) 05/19/2024 (Originally 7/8/2022)   • HEMOGLOBIN A1C  05/20/2023   • INFLUENZA VACCINE  08/01/2023   • MAMMOGRAM  02/13/2024   • URINE MICROALBUMIN  02/20/2024   • ANNUAL PHYSICAL  05/17/2024   • DIABETIC FOOT EXAM  05/17/2024   • TDAP/TD VACCINES (2 - Td or Tdap) 05/13/2032   • COLORECTAL CANCER SCREENING  06/16/2032   • Pneumococcal Vaccine 0-64  Completed            Assessment and Plan    Diagnoses and all orders for this visit:    1. Well adult exam (Primary)  -     CBC (No Diff); Future  -     Comprehensive Metabolic Panel; Future  -     Lipid Panel; Future  -     TSH; Future  -     Hemoglobin A1c; Future  Check fasting labs today.  Colon cancer screening up-to-date.  Schedule for breast cancer screening mammogram 8/22/2023.  Recommend continued eye exams yearly.  2. Type 2 diabetes mellitus with hyperglycemia, without long-term current use of insulin  -     losartan (COZAAR) 100 MG tablet; Take 1 tablet by mouth Daily.  Dispense: 90 tablet; Refill: 1  -     sitaGLIPtin-metFORMIN (Janumet)  MG per tablet; Take 1 tablet by mouth 2 (Two) Times a Day With Meals.  Dispense: 180 tablet; Refill: 1  -     Comprehensive Metabolic Panel; Future  -     Lipid Panel; Future  -     Hemoglobin A1c; Future  Check A1c with labs today.  Patient has made dietary changes and Jardiance was added at last visit.  3. Primary hypertension  -     losartan (COZAAR) 100 MG tablet; Take 1 tablet by mouth  Daily.  Dispense: 90 tablet; Refill: 1  -     Comprehensive Metabolic Panel; Future  -     Lipid Panel; Future  Uncontrolled.  Increase losartan to 100 mg.  Recheck next month.  4. Fatty liver  -     Comprehensive Metabolic Panel; Future  Check follow-up liver enzymes.  5. Chronic pain of left knee  -     Ambulatory Referral to Orthopedic Surgery  Going pain recommend consultation with orthopedics.  6. Need for vaccination  -     Pneumococcal Conjugate Vaccine 20-Valent All  He declined Shingrix second dose.    Counseling/anticipatory guidance: Nutrition, dental health, eye exam, immunizations, screenings      Follow Up   Return in about 1 month (around 6/17/2023) for Office visit HTN and , Physical and fasting labs in year.  Patient was given instructions and counseling regarding her condition or for health maintenance advice. Please see specific information pulled into the AVS if appropriate.      Electronically signed by Sunitha Tobin MD, 05/17/23, 8:59 AM EDT.

## 2023-05-17 NOTE — LETTER
"VACCINE CONSENT FORM      Patient Name:  Eliz Velazquez    Patient :  1960      I/We have read, or have been explained, the information about the diseases and the vaccines listed below.  There was an opportunity to ask questions and any questions were answered satisfactorily.  I/We believe that I/we understand the benefits and risks of the vaccines(s) cited, and ask the vaccine(s) listed below be given to me/us or the person named above (for which i have authorized to make the request).      Vaccine(s) give:    Orders Placed This Encounter   Procedures   • Pneumococcal Conjugate Vaccine 20-Valent All         Medicare patients:    The only vaccine covered under your medical benefit is flu/pneumonia and hepatitis B.  All other may be covered under your \"Part D\" prescription plan and requires you to go to a pharmacy with a Physician orders for administration.  If you still prefer to have it administered at our office, you will receive a bill for the vaccine and administration cost.               Patient Initials                     Patient or Parent/Guardian Signature                    Date        A copy of the appropriate Centers for Disease Control and Prevention Vaccine Information Statements has been provided.   "

## 2023-05-18 ENCOUNTER — TELEPHONE (OUTPATIENT)
Dept: FAMILY MEDICINE CLINIC | Facility: CLINIC | Age: 63
End: 2023-05-18
Payer: COMMERCIAL

## 2023-05-18 PROBLEM — E78.00 PURE HYPERCHOLESTEROLEMIA: Status: ACTIVE | Noted: 2023-05-18

## 2023-05-18 RX ORDER — ATORVASTATIN CALCIUM 20 MG/1
20 TABLET, FILM COATED ORAL NIGHTLY
Qty: 90 TABLET | Refills: 3 | Status: SHIPPED | OUTPATIENT
Start: 2023-05-18

## 2023-05-18 NOTE — TELEPHONE ENCOUNTER
Patient returned call, relayed results. Verbalized understanding and agreed to follow medical advice.

## 2023-05-18 NOTE — TELEPHONE ENCOUNTER
Please call patient about abnormal results.  I recommend starting atorvastatin for cholesterol and if she is agreeable I will send it to the pharmacy.  Letter will also be mailed with full results.      The test results show that your blood sugar and A1c are elevated.  A1c is improving and I would like you to continue taking the Janumet and the Jardiance as well as dietary changes.  Goal A1c less than 6.5 and I would like to recheck your diabetes in 3 months.  Normal kidney function, electrolytes, and liver function.  Normal white blood cell count, no anemia, and normal platelets.  LDL bad cholesterol remains elevated with a goal less than 100.  I recommend atorvastatin cholesterol-lowering medication to improve cholesterol and lower risk for heart attack and stroke.  Normal thyroid function.

## 2023-06-19 NOTE — THERAPY PROGRESS REPORT/RE-CERT
Outpatient Physical Therapy Ortho Progress Note   Lori     Patient Name: Eliz Velazquez  : 1960  MRN: [id number]  Today's Date: 2022      Visit Date: 2022    Visit Dx:    ICD-10-CM ICD-9-CM   1. Abdominal wall pain in left flank  R10.9 789.09   2. Rib pain on left side  R07.81 786.50   3. Acute left-sided thoracic back pain  M54.6 724.1       Patient Active Problem List   Diagnosis   • Seasonal allergies   • Primary hypertension   • Fatty liver   • Type 2 diabetes mellitus with hyperglycemia, without long-term current use of insulin (HCC)        Past Medical History:   Diagnosis Date   • Hypertension    • Iron deficiency anemia secondary to blood loss (chronic)     heavy menses        Past Surgical History:   Procedure Laterality Date   • TONSILLECTOMY     • TOTAL ABDOMINAL HYSTERECTOMY WITH SALPINGO OOPHORECTOMY     • TUBAL ABDOMINAL LIGATION          PT Ortho     Row Name 22 1300       Posture/Observations    Posture/Observations Comments palpation: minimal anterior abdomen tenderness, mod tenderness lateral lower left rib cage, tenderness posterior left lower trap region.  -MM       Head/Neck/Trunk    Trunk Extension AROM 35  -MM    Trunk Flexion AROM 65 mod loss  -MM    Trunk Lt Lateral Flexion AROM min loss  -MM    Trunk Rt Lateral Flexion AROM mod loss  -MM    Trunk Lt Rotation AROM mod loss  -MM    Trunk Rt Rotation AROM mod loss  -MM       Left Upper Ext    Lt Shoulder Abduction AROM 150  -MM    Lt Shoulder Flexion AROM 150  -MM       Transfers    Comment, (Transfers) no difficulty with transfers sit/stand  -MM       Gait/Stairs (Locomotion)    Comment, (Gait/Stairs) normal gait  -MM          User Key  (r) = Recorded By, (t) = Taken By, (c) = Cosigned By    Initials Name Provider Type    Chase Tobias, PT Physical Therapist                 PT Assessment/Plan     Row Name 22 1300          PT Assessment    Functional Limitations Limitation in home  management;Limitations in community activities;Performance in leisure activities;Performance in self-care ADL;Limitations in functional capacity and performance  -MM     Impairments Muscle strength;Pain;Range of motion  -MM     Assessment Comments Client is making good progress overall. She has normal shoulder ROM now. She is now able to transfer sit/stand and walk without difficulty. She has to be careful when stooping and picking up items. She still has some pain left posterior rib cage and musculature. She uses pain medicine at night 50% of the time. She is now able to perform regular activity at home cooking and cleaning. 3 of 4 short term goals were achieved and each long term goal is ongoing. Modified Oswestry score improved from 58% to 30%. QuickDASH score improved from 82% to 23%. Further skilled care is required to minimize pain and improve overall function.  -MM     Please refer to paper survey for additional self-reported information Yes  -MM     Rehab Potential Good  -MM     Patient/caregiver participated in establishment of treatment plan and goals Yes  -MM     Patient would benefit from skilled therapy intervention Yes  -MM            PT Plan    PT Frequency 2x/week;1x/week  -MM     Predicted Duration of Therapy Intervention (PT) 1-2 months  -MM     Planned CPT's? PT THER PROC EA 15 MIN: 51403;PT THER ACT EA 15 MIN: 83866;PT MANUAL THERAPY EA 15 MIN: 64605  -MM     PT Plan Comments continue per plan of treatment with low level strengthening and mobility exercises. Continue ASTYM.  -MM           User Key  (r) = Recorded By, (t) = Taken By, (c) = Cosigned By    Initials Name Provider Type    MM Chase Mccormick, PT Physical Therapist               OP Exercises     Row Name 09/19/22 1300             Subjective Comments    Subjective Comments Client reports pain today at 7/10. She has been doing more lately. She was able to lift one load of laundry from the washer and put them in the dryer. she has been  doing her regular activity at home. She reports taking pain medicine at night time about 50% of the days in the week.  -MM              Subjective Pain    Able to rate subjective pain? yes  -MM      Pre-Treatment Pain Level 7  -MM      Post-Treatment Pain Level 6  -MM              Total Minutes    35210 - PT Therapeutic Exercise Minutes 28  -MM      93270 - PT Manual Therapy Minutes 15  -MM              Exercise 1    Exercise Name 1 UBE  -MM      Time 1 4 min  -MM              Exercise 2    Exercise Name 2 therapy bar: flexion, abduction  -MM      Reps 2 15/15  -MM      Additional Comments 3#  -MM              Exercise 4    Exercise Name 4 ball roll up wall  -MM      Reps 4 20  -MM              Exercise 5    Exercise Name 5 standing circles w ball  -MM      Reps 5 15 ea  -MM              Exercise 6    Exercise Name 6 thoracic extension w sheet and seated w foam roll  -MM      Reps 6 20/20  -MM              Exercise 7    Exercise Name 7 side glide and reach  -MM      Reps 7 10 ea  -MM              Exercise 8    Exercise Name 8 standing rotation w bar  -MM      Reps 8 15/15  -MM              Exercise 9    Exercise Name 9 band: shoulder extension, rows, pull aparts  -MM      Reps 9 15/15/15  -MM      Additional Comments green/green/red  -MM            User Key  (r) = Recorded By, (t) = Taken By, (c) = Cosigned By    Initials Name Provider Type    Chase Tobias, PT Physical Therapist                Manual Rx (last 36 hours)     Manual Treatments     Row Name 09/19/22 1300             Total Minutes    22293 - PT Manual Therapy Minutes 15  -MM              Manual Rx 1    Manual Rx 1 Location Left thoracic region  -MM      Manual Rx 1 Type Provided soft tissue mobilization using ASTYM technique with client seated. Light to moderate pressure used with AStYM.  -MM      Manual Rx 1 Duration 15  -MM            User Key  (r) = Recorded By, (t) = Taken By, (c) = Cosigned By    Initials Name Provider Type    PRO Mccormick  Chase SONI, PT Physical Therapist                 PT OP Goals     Row Name 09/19/22 1300          PT Short Term Goals    STG Date to Achieve 09/12/22  -MM     STG 1 Client will report 50% improvement in abdominal pain with daily activity.  -MM     STG 1 Progress Met  -MM     STG 2 Client will  improve to sitting with neutral posture without difficulty.  -MM     STG 2 Progress Met  -MM     STG 3 Client will be independent with home program to minimize pain.  -MM     STG 3 Progress Ongoing  -MM     STG 4 Client will transfer sit to stand without difficulty.  -MM     STG 4 Progress Met  -MM            Long Term Goals    LTG Date to Achieve 10/03/22  -MM     LTG 1 QuickDAsh score will improve to 50%.  -MM     LTG 1 Progress Ongoing  -MM     LTG 2 Modified Oswestry score will improve to 36%.  -MM     LTG 2 Progress Ongoing  -MM     LTG 3 Client will improve to sleeping in her bed without difficulty.  -MM     LTG 3 Progress Ongoing  -MM     LTG 4 Client will improve to picking up light items from floor to waist without difficulty.  -MM     LTG 4 Progress Ongoing  -MM            Time Calculation    PT Goal Re-Cert Due Date 11/20/22  -MM           User Key  (r) = Recorded By, (t) = Taken By, (c) = Cosigned By    Initials Name Provider Type    Chaes Tobias, PT Physical Therapist                 Outcome Measure Options: Modified Oswestry, Quick DASH  Quick DASH  Open a tight or new jar.: No Difficulty  Do heavy household chores (e.g., wash walls, wash floors): Moderate Difficulty  Carry a shopping bag or briefcase: Mild Difficulty  Wash your back: No Difficulty  Use a knife to cut food: No Difficulty  Recreational activities in which you take some force or impact through your arm, should or hand (e.g. golf, hammering, tennis, etc.): Moderate Difficulty  During the past week, to what extent has your arm, shoulder, or hand problem interfered with your normal social activites with family, friends, neighbors or groups?:  Not at all  During the past week, were you limited in your work or other regular daily activities as a result of your arm, shoulder or hand problem?: Moderately Limited  Arm, Shoulder, or hand pain: Mild  Tingling (pins and needles) in your arm, shoulder, or hand: None  During the past week, how much difficulty have you had sleeping because of the pain in your arm, shoulder or hand?: Moderate Difficiculty  Number of Questions Answered: 11  Quick DASH Score: 22.73         Time Calculation:   Start Time: 1300  Timed Charges  73041 - PT Therapeutic Exercise Minutes: 28  50972 - PT Manual Therapy Minutes: 15  Total Minutes  Timed Charges Total Minutes: 43   Total Minutes: 43  Therapy Charges for Today     Code Description Service Date Service Provider Modifiers Qty    19326481074 HC PT THER PROC EA 15 MIN 9/19/2022 Chase Mccormick, PT GP 2    84199249974 HC PT MANUAL THERAPY EA 15 MIN 9/19/2022 Chase Mccormick, PT GP 1          PT G-Codes  Outcome Measure Options: Modified Oswestry, Quick DASH  Quick DASH Score: 22.73         Chase Mccormick, PT  9/20/2022      Show Spray Paint Technique Variable?: Yes Detail Level: Detailed Post-Care Instructions: I reviewed with the patient in detail post-care instructions. Patient is to wear sunprotection, and avoid picking at any of the treated lesions. Pt may apply Vaseline to crusted or scabbing areas. Duration Of Freeze Thaw-Cycle (Seconds): 5 Spray Paint Text: The liquid nitrogen was applied to the skin utilizing a spray paint frosting technique. Add 52 Modifier (Optional): no Consent: The patient's consent was obtained including but not limited to risks of crusting, scabbing, blistering, scarring, darker or lighter pigmentary change, recurrence, incomplete removal and infection. Medical Necessity Information: It is in your best interest to select a reason for this procedure from the list below. All of these items fulfill various CMS LCD requirements except the new and changing color options. Number Of Freeze-Thaw Cycles: 2 freeze-thaw cycles Medical Necessity Clause: This procedure was medically necessary because the lesions that were treated were:

## 2023-08-18 DIAGNOSIS — E11.65 TYPE 2 DIABETES MELLITUS WITH HYPERGLYCEMIA, WITHOUT LONG-TERM CURRENT USE OF INSULIN: ICD-10-CM

## 2023-08-18 NOTE — TELEPHONE ENCOUNTER
Caller: Eliz Velazquez    Relationship: Self    Best call back number:     [phone]       Requested Prescriptions:   Requested Prescriptions     Pending Prescriptions Disp Refills    empagliflozin (Jardiance) 25 MG tablet tablet 90 tablet 1     Sig: Take 1 tablet by mouth Daily.        Pharmacy where request should be sent: [facility]     Last office visit with prescribing clinician: 6/22/2023   Last telemedicine visit with prescribing clinician: Visit date not found   Next office visit with prescribing clinician: 8/22/2023     Additional details provided by patient: PATIENT STATED SHE WILL BE OUT OF THE LISTED MEDICATION ON 08/19/23    Does the patient have less than a 3 day supply:  [x] Yes  [] No    Would you like a call back once the refill request has been completed: [] Yes [] No    If the office needs to give you a call back, can they leave a voicemail: [] Yes [] No    Dwayne Mcleod Rep   08/18/23 09:33 EDT

## 2023-08-18 NOTE — TELEPHONE ENCOUNTER
Rx Refill Note  Requested Prescriptions     Pending Prescriptions Disp Refills    empagliflozin (Jardiance) 25 MG tablet tablet 90 tablet 1     Sig: Take 1 tablet by mouth Daily.      Last office visit with prescribing clinician: 6/22/2023   Next office visit with prescribing clinician: 8/22/2023   Margaux Oliva MA  08/18/23, 13:53 EDT    Last fill: 02/20/2023    Sent electronically, pt notified & had no further questions.

## 2023-08-22 ENCOUNTER — OFFICE VISIT (OUTPATIENT)
Age: 63
End: 2023-08-22
Payer: COMMERCIAL

## 2023-08-22 VITALS
DIASTOLIC BLOOD PRESSURE: 80 MMHG | BODY MASS INDEX: 39.48 KG/M2 | OXYGEN SATURATION: 94 % | SYSTOLIC BLOOD PRESSURE: 130 MMHG | HEIGHT: 63 IN | HEART RATE: 73 BPM | WEIGHT: 222.8 LBS

## 2023-08-22 DIAGNOSIS — E11.65 TYPE 2 DIABETES MELLITUS WITH HYPERGLYCEMIA, WITHOUT LONG-TERM CURRENT USE OF INSULIN: Primary | ICD-10-CM

## 2023-08-22 DIAGNOSIS — E11.42 DIABETIC PERIPHERAL NEUROPATHY: ICD-10-CM

## 2023-08-22 LAB
EXPIRATION DATE: NORMAL
HBA1C MFR BLD: 7.1 %
Lab: NORMAL

## 2023-08-22 PROCEDURE — 83036 HEMOGLOBIN GLYCOSYLATED A1C: CPT | Performed by: FAMILY MEDICINE

## 2023-08-22 PROCEDURE — 99214 OFFICE O/P EST MOD 30 MIN: CPT | Performed by: FAMILY MEDICINE

## 2023-08-22 RX ORDER — GLIPIZIDE 5 MG/1
5 TABLET, FILM COATED, EXTENDED RELEASE ORAL DAILY
Qty: 90 TABLET | Refills: 1 | Status: SHIPPED | OUTPATIENT
Start: 2023-08-22

## 2023-08-22 NOTE — PROGRESS NOTES
"Chief Complaint  Diabetes    Subjective        Eliz Velazquez presents to [facility]  Diabetes  She presents for her follow-up diabetic visit. She has type 2 diabetes mellitus. Current diabetic treatment includes oral agent (triple therapy). She is following a diabetic diet.     No home blood sugar monitoring because she can't poke herself. She doesn't buy sweets at home and has cut back. She eats sugar free wafers and biscuits. She doesn't drink pop. Drinks diet reva green tea. Fresh ocasio with fruit.     4 days she couldn't sit down because getting up her left leg was dragging and she had to hold her lower back. She is taking OTC nerve supplement which applying at night helps her sleep.         Objective   Vital Signs:  /80   Pulse 73   Ht 160 cm (62.99\")   Wt 101 kg (222 lb 12.8 oz)   SpO2 94%   BMI 39.48 kg/mý   Estimated body mass index is 39.48 kg/mý as calculated from the following:    Height as of this encounter: 160 cm (62.99\").    Weight as of this encounter: 101 kg (222 lb 12.8 oz).               Physical Exam  Vitals reviewed.   Constitutional:       General: She is not in acute distress.     Appearance: She is obese. She is not ill-appearing.   Cardiovascular:      Rate and Rhythm: Normal rate and regular rhythm.   Pulmonary:      Effort: Pulmonary effort is normal.      Breath sounds: Normal breath sounds.   Neurological:      Mental Status: She is alert.      Result Review :  The following data was reviewed by: Sunitha Tobin MD on 08/22/2023:  Common labs          2/20/2023    08:10 5/17/2023    08:44 8/22/2023    09:18   Common Labs   Glucose  115     BUN  21     Creatinine  0.77     Sodium  141     Potassium  4.6     Chloride  105     Calcium  9.1     Albumin  4.4     Total Bilirubin  0.3     Alkaline Phosphatase  113     AST (SGOT)  21     ALT (SGPT)  23     WBC  7.33     Hemoglobin  14.2     Hematocrit  42.9     Platelets  230     Total " Cholesterol  196     Triglycerides  100     HDL Cholesterol  40     LDL Cholesterol   138     Hemoglobin A1C 8.0  6.70  7.1      A1C Last 3 Results          2/20/2023    08:10 5/17/2023    08:44 8/22/2023    09:18   HGBA1C Last 3 Results   Hemoglobin A1C 8.0  6.70  7.1                   Assessment and Plan   Diagnoses and all orders for this visit:    1. Type 2 diabetes mellitus with hyperglycemia, without long-term current use of insulin (Primary)  -     POC Glycosylated Hemoglobin (Hb A1C)  -     glipizide (glipiZIDE XL) 5 MG ER tablet; Take 1 tablet by mouth Daily.  Dispense: 90 tablet; Refill: 1  Uncontrolled.  Goal A1c less than 6.5.  Patient would like to and avoid injections.  Continue Janumet  twice a day and Jardiance 25 mg once a day.  Add glipizide 5 mg once a day.  Discussed side effects of hypoglycemia and need to eat regularly.  Also provided handout with hypoglycemia information.  Recheck in 3 months.  2. Diabetic peripheral neuropathy  Uncontrolled.  Discussed use of prescription medications including gabapentin, pregabalin, and duloxetine.  Discussed monitoring with controlled substances.  Informational handouts provided on medications.  Patient would like to consider options first.           Follow Up   Return in about 3 months (around 11/22/2023) for Office visit diabetes.  Patient was given instructions and counseling regarding her condition or for health maintenance advice. Please see specific information pulled into the AVS if appropriate.     Electronically signed by Sunitha Tobin MD, 08/22/23, 9:41 AM EDT.

## 2023-09-27 ENCOUNTER — TELEPHONE (OUTPATIENT)
Age: 63
End: 2023-09-27
Payer: COMMERCIAL

## 2023-09-27 NOTE — TELEPHONE ENCOUNTER
I reviewed the urgent care records.  Negative for strep, flu, and COVID.  She was also prescribed amoxicillin antibiotic as well as Promethazine DM cough syrup.  It is optional to take the prednisone.  I do not recommend a work note further than yesterday.

## 2023-09-27 NOTE — TELEPHONE ENCOUNTER
Caller: Eliz Velazquez    Relationship: Self    Best call back number: [phone]     What was the call regarding: PATIENT WAS SEEN IN THE [facility] FOR A BAD SINUS INFECTION, SHE WAS PRESCRIBED A STEROID THAT SHE WILL GET LATER TODAY IF ANY OTHER MEDICATION CAN NOT BE PRESCRIBED. SHE WAS WARNED THIS WILL RAISE HER BLOOD SUGAR SIGNIFICANTLY. PATIENT WOULD RATHER HAVE SOMETHING OTHER THAN THE STEROID. EARS ARE RED, LOTS OF MUCOUS, SHE FEELS LIKE THE LAST TIME SHE HAS COVID. PATIENT IS ALSO REQUESTING MORE TIME OFF OF WORK THIS WEEK.     Is it okay if the provider responds through MyChart: [facility] [phone]

## 2023-11-18 DIAGNOSIS — I10 PRIMARY HYPERTENSION: ICD-10-CM

## 2023-11-18 DIAGNOSIS — E11.65 TYPE 2 DIABETES MELLITUS WITH HYPERGLYCEMIA, WITHOUT LONG-TERM CURRENT USE OF INSULIN: ICD-10-CM

## 2023-11-20 RX ORDER — LOSARTAN POTASSIUM 100 MG/1
100 TABLET ORAL DAILY
Qty: 90 TABLET | Refills: 1 | Status: SHIPPED | OUTPATIENT
Start: 2023-11-20

## 2023-11-20 NOTE — TELEPHONE ENCOUNTER
Rx Refill Note  Requested Prescriptions     Pending Prescriptions Disp Refills    losartan (COZAAR) 100 MG tablet 90 tablet 1     Sig: Take 1 tablet by mouth Daily.      Last office visit with prescribing clinician: 8/22/2023   Last telemedicine visit with prescribing clinician: Visit date not found   Next office visit with prescribing clinician: 12/1/2023                         Would you like a call back once the refill request has been completed: [] Yes [] No    If the office needs to give you a call back, can they leave a voicemail: [] Yes [] No    Jyotsna Jennings MA  11/20/23, 08:37 EST

## 2023-12-12 DIAGNOSIS — E11.65 TYPE 2 DIABETES MELLITUS WITH HYPERGLYCEMIA, WITHOUT LONG-TERM CURRENT USE OF INSULIN: ICD-10-CM

## 2023-12-13 RX ORDER — SITAGLIPTIN AND METFORMIN HYDROCHLORIDE 500; 50 MG/1; MG/1
1 TABLET, FILM COATED ORAL 2 TIMES DAILY WITH MEALS
Qty: 180 TABLET | Refills: 1 | Status: SHIPPED | OUTPATIENT
Start: 2023-12-13

## 2023-12-15 ENCOUNTER — OFFICE VISIT (OUTPATIENT)
Age: 63
End: 2023-12-15
Payer: COMMERCIAL

## 2023-12-15 VITALS
BODY MASS INDEX: 39.09 KG/M2 | SYSTOLIC BLOOD PRESSURE: 134 MMHG | WEIGHT: 220.6 LBS | HEART RATE: 70 BPM | OXYGEN SATURATION: 96 % | DIASTOLIC BLOOD PRESSURE: 80 MMHG | HEIGHT: 63 IN

## 2023-12-15 DIAGNOSIS — L60.0 INGROWING TOENAIL: ICD-10-CM

## 2023-12-15 DIAGNOSIS — E11.9 TYPE 2 DIABETES MELLITUS WITHOUT COMPLICATION, WITHOUT LONG-TERM CURRENT USE OF INSULIN: Primary | ICD-10-CM

## 2023-12-15 DIAGNOSIS — G44.89 OTHER HEADACHE SYNDROME: ICD-10-CM

## 2023-12-15 DIAGNOSIS — I10 PRIMARY HYPERTENSION: ICD-10-CM

## 2023-12-15 PROBLEM — F40.298 NEEDLE PHOBIA: Status: ACTIVE | Noted: 2023-12-15

## 2023-12-15 LAB
EXPIRATION DATE: ABNORMAL
HBA1C MFR BLD: 6.4 % (ref 4.5–5.7)
Lab: ABNORMAL

## 2023-12-15 RX ORDER — GLIPIZIDE 5 MG/1
5 TABLET, FILM COATED, EXTENDED RELEASE ORAL DAILY
Qty: 90 TABLET | Refills: 1 | Status: SHIPPED | OUTPATIENT
Start: 2023-12-15

## 2023-12-15 NOTE — PROGRESS NOTES
"Chief Complaint  Diabetes and Hypertension    Subjective        Eliz Velazquez presents to [facility]  Diabetes  She presents for her follow-up diabetic visit. She has type 2 diabetes mellitus. Current diabetic treatments: 4 oral agents. An ACE inhibitor/angiotensin II receptor blocker is being taken.   Hypertension  This is a chronic problem. The problem is controlled. Current antihypertension treatment includes angiotensin blockers.     No home blood sugar monitoring because she is afraid to poke herself.   She is careful what she eats, even at Thanksgiving.     She occasionally has tingling feeling left side of her scalp and sharp headache. To the touch she has sensation of pressure and it soothes it down. No vision or ear problems. She has had sensitive hair since she was a child.      She has h/o epistaxis from left side only. When she was sick recently she had daily epistaxis.     Left great toenail ingrown and she has tried to cut it out twice.      Objective   Vital Signs:  /80   Pulse 70   Ht 160 cm (62.99\")   Wt 100 kg (220 lb 9.6 oz)   SpO2 96%   BMI 39.09 kg/m²   Estimated body mass index is 39.09 kg/m² as calculated from the following:    Height as of this encounter: 160 cm (62.99\").    Weight as of this encounter: 100 kg (220 lb 9.6 oz).               Physical Exam  Vitals reviewed.   Constitutional:       General: She is not in acute distress.     Appearance: She is obese. She is not ill-appearing.   HENT:      Nose: No congestion or rhinorrhea.   Cardiovascular:      Rate and Rhythm: Normal rate and regular rhythm.   Pulmonary:      Effort: Pulmonary effort is normal.      Breath sounds: Normal breath sounds.   Musculoskeletal:        Feet:    Neurological:      Mental Status: She is alert.        Result Review :  The following data was reviewed by: Sunitha Tobin MD on 12/15/2023:  Common labs          5/17/2023    08:44 8/22/2023    09:18 12/15/2023 "    08:51   Common Labs   Glucose 115      BUN 21      Creatinine 0.77      Sodium 141      Potassium 4.6      Chloride 105      Calcium 9.1      Albumin 4.4      Total Bilirubin 0.3      Alkaline Phosphatase 113      AST (SGOT) 21      ALT (SGPT) 23      WBC 7.33      Hemoglobin 14.2      Hematocrit 42.9      Platelets 230      Total Cholesterol 196      Triglycerides 100      HDL Cholesterol 40      LDL Cholesterol  138      Hemoglobin A1C 6.70  7.1  6.4      A1C Last 3 Results          5/17/2023    08:44 8/22/2023    09:18 12/15/2023    08:51   HGBA1C Last 3 Results   Hemoglobin A1C 6.70  7.1  6.4                   Assessment and Plan   Diagnoses and all orders for this visit:    1. Type 2 diabetes mellitus without complication, without long-term current use of insulin (Primary)  -     POC Glycosylated Hemoglobin (Hb A1C)  -     empagliflozin (Jardiance) 25 MG tablet tablet; Take 1 tablet by mouth Daily.  Dispense: 90 tablet; Refill: 1  -     glipizide (glipiZIDE XL) 5 MG ER tablet; Take 1 tablet by mouth Daily.  Dispense: 90 tablet; Refill: 1  Improved.  Continue Janumet, Jardiance, and glipizide.  Recheck in 3 months.  2. Primary hypertension  Stable.  Continue losartan.  3. Other headache syndrome  Possible neuralgia.  4. Ingrowing toenail  -     Ambulatory Referral to Podiatry  No signs of acute infection or need for antibiotics.  Patient is interested in toenail removal.           Follow Up   Return in about 3 months (around 3/15/2024) for Office visit diabetes.  Patient was given instructions and counseling regarding her condition or for health maintenance advice. Please see specific information pulled into the AVS if appropriate.       Electronically signed by Sunitha Tobin MD, 12/15/23, 9:24 AM EST.

## 2024-01-15 ENCOUNTER — PATIENT OUTREACH (OUTPATIENT)
Dept: CASE MANAGEMENT | Facility: OTHER | Age: 64
End: 2024-01-15
Payer: COMMERCIAL

## 2024-01-15 NOTE — OUTREACH NOTE
AMBULATORY CASE MANAGEMENT NOTE    Name and Relationship of Patient/Support Person: Eliz Velazquez JAYNA - Self  Self        Education Documentation  Follow-Up Care, taught by Vanna Constantino, RN at 1/15/2024  1:58 PM.  Learner: Patient  Readiness: Acceptance  Method: Explanation  Response: Verbalizes Understanding    Consistent Eating Pattern, taught by Vanna Constantino, RN at 1/15/2024  1:58 PM.  Learner: Patient  Readiness: Acceptance  Method: Explanation  Response: Verbalizes Understanding    Monitoring Carbohydrate Intake, taught by Vanna Constantino, RN at 1/15/2024  1:58 PM.  Learner: Patient  Readiness: Acceptance  Method: Explanation  Response: Verbalizes Understanding      Patient Outreach    Call to patient for f/u. States she just saw MD last month and A1c had decreased to 6.4. She was very excited about the results and states MD plans to recheck again in March and if still in good range, plans to decrease the amount of medications she is taking for her diabetes. She states she has been working hard to decrease the amount of carbs she is eating and kinds carbs. We discussed a consistent eating plan to not skip meal and importance of proteins particularly at breakfast. She drinks lots of water and avoids sweeteners in drinks like tea. She does not drink soft drinks. Discussed good foot care since she is on her feet a lot with work. Pt v/u. No concerns at this time and pt has not SDOH deficits.She has ECM contact information if needed    VANNA MAE  Ambulatory Case Management    1/15/2024, 13:59 EST

## 2024-02-20 ENCOUNTER — TELEPHONE (OUTPATIENT)
Age: 64
End: 2024-02-20
Payer: COMMERCIAL

## 2024-02-20 NOTE — TELEPHONE ENCOUNTER
I spoke to the patient a few weeks ago and gave her the phone number for [facility] after they were unable to contact her to scheduled. Since then, I have not been able to reach her. Is it ok to close the referral?

## 2024-03-15 ENCOUNTER — OFFICE VISIT (OUTPATIENT)
Age: 64
End: 2024-03-15
Payer: COMMERCIAL

## 2024-03-15 VITALS
HEIGHT: 63 IN | WEIGHT: 224.2 LBS | SYSTOLIC BLOOD PRESSURE: 130 MMHG | HEART RATE: 65 BPM | DIASTOLIC BLOOD PRESSURE: 84 MMHG | OXYGEN SATURATION: 94 % | BODY MASS INDEX: 39.73 KG/M2

## 2024-03-15 DIAGNOSIS — E11.9 TYPE 2 DIABETES MELLITUS WITHOUT COMPLICATION, WITHOUT LONG-TERM CURRENT USE OF INSULIN: Primary | ICD-10-CM

## 2024-03-15 DIAGNOSIS — E66.01 CLASS 2 SEVERE OBESITY DUE TO EXCESS CALORIES WITH SERIOUS COMORBIDITY AND BODY MASS INDEX (BMI) OF 39.0 TO 39.9 IN ADULT: ICD-10-CM

## 2024-03-15 DIAGNOSIS — E11.9 TYPE 2 DIABETES MELLITUS WITHOUT COMPLICATION, WITHOUT LONG-TERM CURRENT USE OF INSULIN: ICD-10-CM

## 2024-03-15 DIAGNOSIS — M62.838 MUSCLE SPASM: ICD-10-CM

## 2024-03-15 LAB
EXPIRATION DATE: ABNORMAL
HBA1C MFR BLD: 6.3 % (ref 4.5–5.7)
Lab: ABNORMAL

## 2024-03-15 PROCEDURE — 82043 UR ALBUMIN QUANTITATIVE: CPT | Performed by: FAMILY MEDICINE

## 2024-03-15 RX ORDER — CYCLOBENZAPRINE HCL 10 MG
10 TABLET ORAL NIGHTLY PRN
Qty: 30 TABLET | Refills: 0 | Status: SHIPPED | OUTPATIENT
Start: 2024-03-15

## 2024-03-15 NOTE — PROGRESS NOTES
"Chief Complaint  Diabetes and Muscle Pain (Started last night on left side of neck )    Subjective        Eliz Velazquez presents to [facility]  Diabetes  She presents for her follow-up diabetic visit. She has type 2 diabetes mellitus. Current diabetic treatment includes oral agent (dual therapy).   Muscle Pain    Neck Pain   This is a new problem. The current episode started yesterday. The pain is associated with nothing. Quality: pinch.     She limits bread intake. Tries not to eat dessert. Sugar free desserts at home. She tries not to bake but she loves to bake. She has Naan bread.      Last night laying in bed she had spasms in upper back. She did stretches and had to change sleep position. Pain while driving. Exhaling deep and doesn't feel like her lung.          Objective   Vital Signs:  /84   Pulse 65   Ht 160 cm (62.99\")   Wt 102 kg (224 lb 3.2 oz)   SpO2 94%   BMI 39.73 kg/m²   Estimated body mass index is 39.73 kg/m² as calculated from the following:    Height as of this encounter: 160 cm (62.99\").    Weight as of this encounter: 102 kg (224 lb 3.2 oz).               Physical Exam  Vitals reviewed.   Constitutional:       General: She is not in acute distress.     Appearance: She is obese. She is not ill-appearing.   Cardiovascular:      Rate and Rhythm: Normal rate and regular rhythm.   Pulmonary:      Effort: Pulmonary effort is normal.      Breath sounds: Normal breath sounds.   Musculoskeletal:      Cervical back: Normal range of motion.      Thoracic back: Spasms present. No bony tenderness. Normal range of motion.        Back:    Neurological:      Mental Status: She is alert.        Result Review :    The following data was reviewed by: Sunitha Tobin MD on 03/15/2024:  Common labs          8/22/2023    09:18 12/15/2023    08:51 3/15/2024    08:53   Common Labs   Hemoglobin A1C 7.1  6.4  6.3      A1C Last 3 Results          8/22/2023    09:18 " 12/15/2023    08:51 3/15/2024    08:53   HGBA1C Last 3 Results   Hemoglobin A1C 7.1  6.4  6.3                   Assessment and Plan     Diagnoses and all orders for this visit:    1. Type 2 diabetes mellitus without complication, without long-term current use of insulin (Primary)  -     POC Glycosylated Hemoglobin (Hb A1C)  -     MicroAlbumin, Urine, Random - Urine, Clean Catch; Future  Stable. Continue Janumet, glipizide and Jardiance. Recheck in 3 months.   2. Muscle spasm  -     cyclobenzaprine (FLEXERIL) 10 MG tablet; Take 1 tablet by mouth At Night As Needed for Muscle Spasms.  Dispense: 30 tablet; Refill: 0  New.  Start Flexeril at night.  Ice, heat, and massage.  3. Class 2 severe obesity due to excess calories with serious comorbidity and body mass index (BMI) of 39.0 to 39.9 in adult  Class 2 Severe Obesity (BMI >=35 and <=39.9). Obesity-related health conditions include the following: hypertension, diabetes mellitus, dyslipidemias, and osteoarthritis. Obesity is unchanged. BMI is is above average; BMI management plan is completed. We discussed low calorie, low carb based diet program.             Follow Up     Return in about 3 months (around 6/15/2024) for Physical and fasting labs.  Patient was given instructions and counseling regarding her condition or for health maintenance advice. Please see specific information pulled into the AVS if appropriate.     Electronically signed by Sunitha Tobin MD, 03/15/24, 9:05 AM EDT.

## 2024-03-16 LAB — ALBUMIN UR-MCNC: <1.2 MG/DL

## 2024-05-13 DIAGNOSIS — E11.65 TYPE 2 DIABETES MELLITUS WITH HYPERGLYCEMIA, WITHOUT LONG-TERM CURRENT USE OF INSULIN: ICD-10-CM

## 2024-05-13 DIAGNOSIS — E11.9 TYPE 2 DIABETES MELLITUS WITHOUT COMPLICATION, WITHOUT LONG-TERM CURRENT USE OF INSULIN: ICD-10-CM

## 2024-05-13 RX ORDER — SITAGLIPTIN AND METFORMIN HYDROCHLORIDE 500; 50 MG/1; MG/1
1 TABLET, FILM COATED ORAL 2 TIMES DAILY WITH MEALS
Qty: 180 TABLET | Refills: 1 | Status: SHIPPED | OUTPATIENT
Start: 2024-05-13

## 2024-05-13 NOTE — TELEPHONE ENCOUNTER
Rx Refill Note  Requested Prescriptions     Pending Prescriptions Disp Refills    sitaGLIPtin-metFORMIN (Janumet)  MG per tablet 180 tablet 1     Sig: Take 1 tablet by mouth 2 (Two) Times a Day With Meals.    empagliflozin (Jardiance) 25 MG tablet tablet 90 tablet 1     Sig: Take 1 tablet by mouth Daily.      Last office visit with prescribing clinician: 3/15/2024   Last telemedicine visit with prescribing clinician: Visit date not found   Next office visit with prescribing clinician: 7/3/2024       Monika Blair MA  05/13/24, 10:29 EDT

## 2024-05-13 NOTE — TELEPHONE ENCOUNTER
Caller: Eliz Velazquez    Relationship: Self    Best call back number: [phone]     Requested Prescriptions:   Requested Prescriptions     Pending Prescriptions Disp Refills    sitaGLIPtin-metFORMIN (Janumet)  MG per tablet 180 tablet 1     Sig: Take 1 tablet by mouth 2 (Two) Times a Day With Meals.    empagliflozin (Jardiance) 25 MG tablet tablet 90 tablet 1     Sig: Take 1 tablet by mouth Daily.        Pharmacy where request should be sent: [facility]     Last office visit with prescribing clinician: 3/15/2024   Last telemedicine visit with prescribing clinician: Visit date not found   Next office visit with prescribing clinician: 7/3/2024     Additional details provided by patient: PATIENT HAS CALLED REQUESTING REFILLS ON ABOVE MEDICATIONS.    Does the patient have less than a 3 day supply:  [x] Yes  [] No    Would you like a call back once the refill request has been completed: [] Yes [x] No    If the office needs to give you a call back, can they leave a voicemail: [] Yes [x] No    Sofie Pandey   05/13/24 09:56 EDT

## 2024-05-22 DIAGNOSIS — E11.9 TYPE 2 DIABETES MELLITUS WITHOUT COMPLICATION, WITHOUT LONG-TERM CURRENT USE OF INSULIN: ICD-10-CM

## 2024-05-22 DIAGNOSIS — I10 PRIMARY HYPERTENSION: ICD-10-CM

## 2024-05-22 DIAGNOSIS — E11.65 TYPE 2 DIABETES MELLITUS WITH HYPERGLYCEMIA, WITHOUT LONG-TERM CURRENT USE OF INSULIN: ICD-10-CM

## 2024-05-22 RX ORDER — LOSARTAN POTASSIUM 100 MG/1
100 TABLET ORAL DAILY
Qty: 90 TABLET | Refills: 1 | Status: SHIPPED | OUTPATIENT
Start: 2024-05-22

## 2024-05-22 RX ORDER — GLIPIZIDE 5 MG/1
5 TABLET, FILM COATED, EXTENDED RELEASE ORAL DAILY
Qty: 90 TABLET | Refills: 1 | Status: SHIPPED | OUTPATIENT
Start: 2024-05-22

## 2024-05-22 NOTE — TELEPHONE ENCOUNTER
Rx Refill Note  Requested Prescriptions     Pending Prescriptions Disp Refills    glipizide (glipiZIDE XL) 5 MG ER tablet 90 tablet 1     Sig: Take 1 tablet by mouth Daily.    losartan (COZAAR) 100 MG tablet 90 tablet 1     Sig: Take 1 tablet by mouth Daily.      Last office visit with prescribing clinician: 3/15/2024   Last telemedicine visit with prescribing clinician: Visit date not found   Next office visit with prescribing clinician: 7/3/2024       Monika Blair MA  05/22/24, 10:04 EDT

## 2024-05-22 NOTE — TELEPHONE ENCOUNTER
Caller: Eliz Velazquez    Relationship: Self    Best call back number: [phone]     Requested Prescriptions:   Requested Prescriptions     Pending Prescriptions Disp Refills    glipizide (glipiZIDE XL) 5 MG ER tablet 90 tablet 1     Sig: Take 1 tablet by mouth Daily.    losartan (COZAAR) 100 MG tablet 90 tablet 1     Sig: Take 1 tablet by mouth Daily.        Pharmacy where request should be sent: [facility]     Last office visit with prescribing clinician: 3/15/2024   Last telemedicine visit with prescribing clinician: Visit date not found   Next office visit with prescribing clinician: 7/3/2024         Does the patient have less than a 3 day supply:  [x] Yes  [] No    Would you like a call back once the refill request has been completed: [] Yes [x] No    If the office needs to give you a call back, can they leave a voicemail: [] Yes [x] No    Dwayne Suero Rep   05/22/24 10:00 EDT

## 2024-07-03 ENCOUNTER — OFFICE VISIT (OUTPATIENT)
Age: 64
End: 2024-07-03
Payer: COMMERCIAL

## 2024-07-03 ENCOUNTER — LAB (OUTPATIENT)
Age: 64
End: 2024-07-03
Payer: COMMERCIAL

## 2024-07-03 VITALS
WEIGHT: 228 LBS | HEART RATE: 68 BPM | SYSTOLIC BLOOD PRESSURE: 132 MMHG | DIASTOLIC BLOOD PRESSURE: 82 MMHG | RESPIRATION RATE: 16 BRPM | HEIGHT: 61 IN | BODY MASS INDEX: 43.05 KG/M2 | OXYGEN SATURATION: 98 %

## 2024-07-03 DIAGNOSIS — L60.8 TOENAIL DEFORMITY: ICD-10-CM

## 2024-07-03 DIAGNOSIS — Z00.00 WELL ADULT EXAM: ICD-10-CM

## 2024-07-03 DIAGNOSIS — E78.00 PURE HYPERCHOLESTEROLEMIA: ICD-10-CM

## 2024-07-03 DIAGNOSIS — Z51.81 THERAPEUTIC DRUG MONITORING: ICD-10-CM

## 2024-07-03 DIAGNOSIS — M17.11 PRIMARY OSTEOARTHRITIS OF RIGHT KNEE: ICD-10-CM

## 2024-07-03 DIAGNOSIS — M65.4 DE QUERVAIN'S TENOSYNOVITIS, LEFT: ICD-10-CM

## 2024-07-03 DIAGNOSIS — I10 PRIMARY HYPERTENSION: ICD-10-CM

## 2024-07-03 DIAGNOSIS — E11.65 TYPE 2 DIABETES MELLITUS WITH HYPERGLYCEMIA, WITHOUT LONG-TERM CURRENT USE OF INSULIN: ICD-10-CM

## 2024-07-03 DIAGNOSIS — E66.01 CLASS 3 SEVERE OBESITY DUE TO EXCESS CALORIES WITH SERIOUS COMORBIDITY AND BODY MASS INDEX (BMI) OF 40.0 TO 44.9 IN ADULT: ICD-10-CM

## 2024-07-03 DIAGNOSIS — R92.8 ABNORMAL MAMMOGRAM: ICD-10-CM

## 2024-07-03 DIAGNOSIS — Z00.00 WELL ADULT EXAM: Primary | ICD-10-CM

## 2024-07-03 LAB
ALBUMIN SERPL-MCNC: 4.2 G/DL (ref 3.5–5.2)
ALBUMIN/GLOB SERPL: 1.4 G/DL
ALP SERPL-CCNC: 111 U/L (ref 39–117)
ALT SERPL W P-5'-P-CCNC: 20 U/L (ref 1–33)
ANION GAP SERPL CALCULATED.3IONS-SCNC: 7 MMOL/L (ref 5–15)
AST SERPL-CCNC: 18 U/L (ref 1–32)
BILIRUB SERPL-MCNC: 0.2 MG/DL (ref 0–1.2)
BUN SERPL-MCNC: 16 MG/DL (ref 8–23)
BUN/CREAT SERPL: 23.2 (ref 7–25)
CALCIUM SPEC-SCNC: 9.7 MG/DL (ref 8.6–10.5)
CHLORIDE SERPL-SCNC: 107 MMOL/L (ref 98–107)
CHOLEST SERPL-MCNC: 165 MG/DL (ref 0–200)
CO2 SERPL-SCNC: 28 MMOL/L (ref 22–29)
CREAT SERPL-MCNC: 0.69 MG/DL (ref 0.57–1)
DEPRECATED RDW RBC AUTO: 41.1 FL (ref 37–54)
EGFRCR SERPLBLD CKD-EPI 2021: 97.1 ML/MIN/1.73
ERYTHROCYTE [DISTWIDTH] IN BLOOD BY AUTOMATED COUNT: 12.4 % (ref 12.3–15.4)
EXPIRATION DATE: ABNORMAL
GLOBULIN UR ELPH-MCNC: 3 GM/DL
GLUCOSE SERPL-MCNC: 118 MG/DL (ref 65–99)
HBA1C MFR BLD: 6.6 % (ref 4.5–5.7)
HCT VFR BLD AUTO: 43.1 % (ref 34–46.6)
HDLC SERPL-MCNC: 40 MG/DL (ref 40–60)
HGB BLD-MCNC: 13.9 G/DL (ref 12–15.9)
LDLC SERPL CALC-MCNC: 102 MG/DL (ref 0–100)
LDLC/HDLC SERPL: 2.49 {RATIO}
Lab: ABNORMAL
MCH RBC QN AUTO: 29.3 PG (ref 26.6–33)
MCHC RBC AUTO-ENTMCNC: 32.3 G/DL (ref 31.5–35.7)
MCV RBC AUTO: 90.7 FL (ref 79–97)
PLATELET # BLD AUTO: 200 10*3/MM3 (ref 140–450)
PMV BLD AUTO: 13.6 FL (ref 6–12)
POTASSIUM SERPL-SCNC: 4.8 MMOL/L (ref 3.5–5.2)
PROT SERPL-MCNC: 7.2 G/DL (ref 6–8.5)
RBC # BLD AUTO: 4.75 10*6/MM3 (ref 3.77–5.28)
SODIUM SERPL-SCNC: 142 MMOL/L (ref 136–145)
TRIGL SERPL-MCNC: 128 MG/DL (ref 0–150)
TSH SERPL DL<=0.05 MIU/L-ACNC: 3.87 UIU/ML (ref 0.27–4.2)
VLDLC SERPL-MCNC: 23 MG/DL (ref 5–40)
WBC NRBC COR # BLD AUTO: 6.47 10*3/MM3 (ref 3.4–10.8)

## 2024-07-03 PROCEDURE — 99396 PREV VISIT EST AGE 40-64: CPT | Performed by: FAMILY MEDICINE

## 2024-07-03 PROCEDURE — 80061 LIPID PANEL: CPT | Performed by: FAMILY MEDICINE

## 2024-07-03 PROCEDURE — 99214 OFFICE O/P EST MOD 30 MIN: CPT | Performed by: FAMILY MEDICINE

## 2024-07-03 PROCEDURE — 83036 HEMOGLOBIN GLYCOSYLATED A1C: CPT | Performed by: FAMILY MEDICINE

## 2024-07-03 PROCEDURE — 80050 GENERAL HEALTH PANEL: CPT | Performed by: FAMILY MEDICINE

## 2024-07-03 PROCEDURE — 36415 COLL VENOUS BLD VENIPUNCTURE: CPT | Performed by: FAMILY MEDICINE

## 2024-07-03 RX ORDER — LOSARTAN POTASSIUM 100 MG/1
100 TABLET ORAL DAILY
Qty: 90 TABLET | Refills: 1 | Status: SHIPPED | OUTPATIENT
Start: 2024-07-03

## 2024-07-03 RX ORDER — CELECOXIB 200 MG/1
200 CAPSULE ORAL 2 TIMES DAILY
Qty: 180 CAPSULE | Refills: 3 | Status: SHIPPED | OUTPATIENT
Start: 2024-07-03

## 2024-07-03 RX ORDER — TERBINAFINE HYDROCHLORIDE 250 MG/1
250 TABLET ORAL DAILY
Qty: 90 TABLET | Refills: 0 | Status: SHIPPED | OUTPATIENT
Start: 2024-07-03 | End: 2024-10-04

## 2024-07-03 RX ORDER — ATORVASTATIN CALCIUM 20 MG/1
20 TABLET, FILM COATED ORAL NIGHTLY
Qty: 90 TABLET | Refills: 3 | Status: SHIPPED | OUTPATIENT
Start: 2024-07-03 | End: 2024-07-08 | Stop reason: DRUGHIGH

## 2024-07-03 NOTE — PROGRESS NOTES
"Chief Complaint  Annual Exam    Subjective          Eliz Velazquez presents to [facility] for   History of Present Illness      No breast symptoms or pain.     She us due in October for dilated eye exam.     She is not getting a booster of Shingrix.     She has dinner late. She is home at 745. She eats once at work. She gained 6 lbs. Low carb diet. She avoids breads.     She continues to have upper back pain. Affecting her sleep and has to change positions. Shoulder blade muscle are.     Left foot pain really bad. Has trouble tolerating wearing a shoe. Feels like rocks in her shoes. She hasn't been back to podiatrist and cancelled appointment.     Left thumb pain and radiates into forearm. Tingling in arm.     Right third toenail discolored and thickened.     Objective   Vital Signs:   Vitals:    07/03/24 0823   BP: 132/82   BP Location: Right arm   Patient Position: Sitting   Cuff Size: Large Adult   Pulse: 68   Resp: 16   SpO2: 98%   Weight: 103 kg (228 lb)   Height: 155.6 cm (61.25\")     Body mass index is 42.73 kg/m².                Physical Exam  Constitutional:       General: She is not in acute distress.     Appearance: She is morbidly obese.   HENT:      Right Ear: Tympanic membrane and ear canal normal.      Left Ear: Tympanic membrane and ear canal normal.      Nose: No congestion or rhinorrhea.      Mouth/Throat:      Mouth: Mucous membranes are moist.      Pharynx: No oropharyngeal exudate or posterior oropharyngeal erythema.   Eyes:      General:         Right eye: No discharge.         Left eye: No discharge.      Conjunctiva/sclera: Conjunctivae normal.   Neck:      Thyroid: No thyromegaly.   Cardiovascular:      Rate and Rhythm: Normal rate and regular rhythm.   Pulmonary:      Effort: Pulmonary effort is normal.      Breath sounds: Normal breath sounds.   Abdominal:      Palpations: Abdomen is soft. There is no hepatomegaly.      Tenderness: There is no abdominal " tenderness.   Musculoskeletal:      Left wrist: No swelling, deformity or tenderness. Normal range of motion.      Cervical back: Neck supple.        Feet:       Comments: Positive Finkelstein's test left   Feet:      Right foot:      Protective Sensation: 6 sites tested.  6 sites sensed.      Toenail Condition: Right toenails are abnormally thick.      Left foot:      Protective Sensation: 6 sites tested.  6 sites sensed.      Toenail Condition: Left toenails are normal.      Comments: Diabetic Foot Exam Performed and Monofilament Test Performed            Lymphadenopathy:      Head:      Right side of head: No submandibular, preauricular or posterior auricular adenopathy.      Left side of head: No submandibular, preauricular or posterior auricular adenopathy.      Cervical: No cervical adenopathy.   Skin:     General: Skin is warm.   Neurological:      Mental Status: She is alert and oriented to person, place, and time.   Psychiatric:         Mood and Affect: Mood normal.         Behavior: Behavior normal.         Thought Content: Thought content normal.         Judgment: Judgment normal.        Result Review :   The following data was reviewed by: Sunitha Tobin MD on 07/03/2024:  Common labs          12/15/2023    08:51 3/15/2024    08:53 3/15/2024    09:47 7/3/2024    08:30   Common Labs   Hemoglobin A1C 6.4  6.3   6.6    Microalbumin, Urine   <1.2       A1C Last 3 Results          12/15/2023    08:51 3/15/2024    08:53 7/3/2024    08:30   HGBA1C Last 3 Results   Hemoglobin A1C 6.4  6.3  6.6               Immunization History   Administered Date(s) Administered    COVID-19 (PFIZER) Purple Cap Monovalent 12/21/2020, 01/11/2021, 10/08/2021    Covid-19 (Pfizer) Gray Cap Monovalent 05/13/2022    Fluzone (or Fluarix & Flulaval for VFC) >6mos 11/02/2023    Hepatitis A 04/21/2019, 05/13/2022    Pneumococcal Conjugate 20-Valent (PCV20) 05/17/2023    Shingrix 05/18/2022    Tdap 05/13/2022       Health Maintenance    Topic Date Due    DIABETIC EYE EXAM  Never done    RSV Vaccine - Adults (1 - 1-dose 60+ series) Never done    HEPATITIS C SCREENING  Never done    ZOSTER VACCINE (2 of 2) 07/13/2022    COVID-19 Vaccine (5 - 2023-24 season) 09/01/2023    MAMMOGRAM  02/13/2024    ANNUAL PHYSICAL  05/17/2024    LIPID PANEL  05/17/2024    HEMOGLOBIN A1C  10/03/2024    INFLUENZA VACCINE  08/01/2024    URINE MICROALBUMIN  03/15/2025    BMI FOLLOWUP  03/15/2025    DIABETIC FOOT EXAM  07/03/2025    TDAP/TD VACCINES (2 - Td or Tdap) 05/13/2032    COLORECTAL CANCER SCREENING  06/16/2032    Pneumococcal Vaccine 0-64  Completed            Assessment and Plan    Diagnoses and all orders for this visit:    1. Well adult exam (Primary)  -     CBC (No Diff); Future  -     Comprehensive Metabolic Panel; Future  -     Lipid Panel; Future  -     TSH Rfx On Abnormal To Free T4; Future  Check fasting labs today.  Requested diabetic eye exam records.  She declines further Shingrix booster.  Recommend RSV vaccine at local pharmacy.  She is overdue for mammogram.  Colon cancer screening colonoscopy up-to-date.  2. Type 2 diabetes mellitus with hyperglycemia, without long-term current use of insulin  -     POC Glycosylated Hemoglobin (Hb A1C)  -     atorvastatin (LIPITOR) 20 MG tablet; Take 1 tablet by mouth Every Night.  Dispense: 90 tablet; Refill: 3  -     losartan (COZAAR) 100 MG tablet; Take 1 tablet by mouth Daily.  Dispense: 90 tablet; Refill: 1  -     Comprehensive Metabolic Panel; Future  -     Lipid Panel; Future  Uncontrolled.  Recommend increasing medication but she declined at this time.  Continue Janumet, Jardiance, and glipizide.  Recommend continue diabetic diet.  Recheck in 3 months and if still elevated increase glipizide dose.  3. Class 3 severe obesity due to excess calories with serious comorbidity and body mass index (BMI) of 40.0 to 44.9 in adult  Class 3 Severe Obesity (BMI >=40). Obesity-related health conditions include the  following: hypertension, diabetes mellitus, dyslipidemias, and osteoarthritis. Obesity is worsening. BMI is is above average; BMI management plan is completed. We discussed  diabetic diet .    4. Primary hypertension  -     losartan (COZAAR) 100 MG tablet; Take 1 tablet by mouth Daily.  Dispense: 90 tablet; Refill: 1  -     Comprehensive Metabolic Panel; Future  -     Lipid Panel; Future  Stable.  Continue losartan.  5. Pure hypercholesterolemia  -     atorvastatin (LIPITOR) 20 MG tablet; Take 1 tablet by mouth Every Night.  Dispense: 90 tablet; Refill: 3  -     Comprehensive Metabolic Panel; Future  -     Lipid Panel; Future  LDL goal less than 100.  Continue statin.  6. Primary osteoarthritis of right knee  -     celecoxib (CeleBREX) 200 MG capsule; Take 1 capsule by mouth 2 (Two) Times a Day.  Dispense: 180 capsule; Refill: 3  Stable.  7. Abnormal mammogram  -     Mammo Diagnostic Digital Tomosynthesis Bilateral With CAD; Future  Patient is overdue for follow-up diagnostic mammogram.  8. De Quervain's tenosynovitis, left  -     Diclofenac Sodium (VOLTAREN) 1 % gel gel; Apply 4 g topically to the appropriate area as directed 4 (Four) Times a Day As Needed (arm pain).  Dispense: 150 g; Refill: 3  New.  Treat with Voltaren.  Celebrex as above.  9. Toenail deformity  -     terbinafine (lamiSIL) 250 MG tablet; Take 1 tablet by mouth Daily for 90 days.  Dispense: 90 tablet; Refill: 0  -     Comprehensive Metabolic Panel; Future  New.  Treat with terbinafine.  Return in 1 month for liver function test.  10. Therapeutic drug monitoring  -     Comprehensive Metabolic Panel; Future        Counseling/anticipatory guidance: Nutrition, eye exam, immunizations, screenings      Follow Up   Return in about 3 months (around 10/3/2024) for Office visit diabetes AND , Physical and fasting labs 1 year.  Patient was given instructions and counseling regarding her condition or for health maintenance advice. Please see specific  information pulled into the AVS if appropriate.      Electronically signed by Sunitha Tobin MD, 07/03/24, 8:58 AM EDT.

## 2024-07-08 DIAGNOSIS — E78.00 PURE HYPERCHOLESTEROLEMIA: ICD-10-CM

## 2024-07-08 DIAGNOSIS — E11.65 TYPE 2 DIABETES MELLITUS WITH HYPERGLYCEMIA, WITHOUT LONG-TERM CURRENT USE OF INSULIN: ICD-10-CM

## 2024-07-08 RX ORDER — ATORVASTATIN CALCIUM 40 MG/1
40 TABLET, FILM COATED ORAL NIGHTLY
Qty: 90 TABLET | Refills: 0 | Status: SHIPPED | OUTPATIENT
Start: 2024-07-08

## 2024-10-03 ENCOUNTER — OFFICE VISIT (OUTPATIENT)
Age: 64
End: 2024-10-03
Payer: COMMERCIAL

## 2024-10-03 ENCOUNTER — PRIOR AUTHORIZATION (OUTPATIENT)
Age: 64
End: 2024-10-03
Payer: COMMERCIAL

## 2024-10-03 VITALS
BODY MASS INDEX: 43.07 KG/M2 | OXYGEN SATURATION: 98 % | HEART RATE: 76 BPM | WEIGHT: 228.13 LBS | DIASTOLIC BLOOD PRESSURE: 76 MMHG | SYSTOLIC BLOOD PRESSURE: 130 MMHG | RESPIRATION RATE: 18 BRPM | HEIGHT: 61 IN

## 2024-10-03 DIAGNOSIS — E11.65 TYPE 2 DIABETES MELLITUS WITH HYPERGLYCEMIA, WITHOUT LONG-TERM CURRENT USE OF INSULIN: Primary | ICD-10-CM

## 2024-10-03 DIAGNOSIS — Z23 NEED FOR VACCINATION: ICD-10-CM

## 2024-10-03 DIAGNOSIS — Z12.31 VISIT FOR SCREENING MAMMOGRAM: ICD-10-CM

## 2024-10-03 LAB
EXPIRATION DATE: ABNORMAL
HBA1C MFR BLD: 6.8 % (ref 4.5–5.7)
Lab: ABNORMAL

## 2024-10-03 PROCEDURE — 90471 IMMUNIZATION ADMIN: CPT | Performed by: FAMILY MEDICINE

## 2024-10-03 PROCEDURE — 90656 IIV3 VACC NO PRSV 0.5 ML IM: CPT | Performed by: FAMILY MEDICINE

## 2024-10-03 PROCEDURE — 83036 HEMOGLOBIN GLYCOSYLATED A1C: CPT | Performed by: FAMILY MEDICINE

## 2024-10-03 PROCEDURE — 99214 OFFICE O/P EST MOD 30 MIN: CPT | Performed by: FAMILY MEDICINE

## 2024-10-03 RX ORDER — METFORMIN HCL 500 MG
1000 TABLET, EXTENDED RELEASE 24 HR ORAL 2 TIMES DAILY
Qty: 360 TABLET | Refills: 1 | Status: SHIPPED | OUTPATIENT
Start: 2024-10-03

## 2024-10-03 RX ORDER — ORAL SEMAGLUTIDE 3 MG/1
3 TABLET ORAL
Qty: 30 TABLET | Refills: 0 | Status: SHIPPED | OUTPATIENT
Start: 2024-10-03

## 2024-10-03 NOTE — PROGRESS NOTES
"Chief Complaint  Diabetes    Subjective        Eliz Velazquez presents to [facility]  Diabetes    Follow-up  Conditions present:  Diabetes    Diabetes: Diabetes type: type 2.Current diabetic treatment: oral medications and diet. Home blood testing: not monitored. Meal planning: low carbohydrate diet and avoidance of concentrated sweets.     She declines home glucose monitoring. She is not interested in injectable medication.     She doesn't eat pasta for a year. Not baking at home.     No side effects on medications.         Objective   Vital Signs:  /76 (BP Location: Left arm, Patient Position: Sitting, Cuff Size: Adult)   Pulse 76   Resp 18   Ht 155.6 cm (61.26\")   Wt 103 kg (228 lb 2 oz)   SpO2 98%   BMI 42.74 kg/m²   Estimated body mass index is 42.74 kg/m² as calculated from the following:    Height as of this encounter: 155.6 cm (61.26\").    Weight as of this encounter: 103 kg (228 lb 2 oz).            Physical Exam  Vitals reviewed.   Constitutional:       General: She is not in acute distress.     Appearance: She is obese. She is not ill-appearing.   Cardiovascular:      Rate and Rhythm: Normal rate and regular rhythm.   Pulmonary:      Effort: Pulmonary effort is normal.      Breath sounds: Normal breath sounds.   Neurological:      Mental Status: She is alert.   Psychiatric:         Mood and Affect: Mood normal.        Result Review :  The following data was reviewed by: Sunitha Tobin MD on 10/03/2024:  Common labs          3/15/2024    08:53 3/15/2024    09:47 7/3/2024    08:30 7/3/2024    08:56 10/3/2024    08:47   Common Labs   Glucose    118     BUN    16     Creatinine    0.69     Sodium    142     Potassium    4.8     Chloride    107     Calcium    9.7     Albumin    4.2     Total Bilirubin    0.2     Alkaline Phosphatase    111     AST (SGOT)    18     ALT (SGPT)    20     WBC    6.47     Hemoglobin    13.9     Hematocrit    43.1     Platelets    200 "     Total Cholesterol    165     Triglycerides    128     HDL Cholesterol    40     LDL Cholesterol     102     Hemoglobin A1C 6.3   6.6   6.8    Microalbumin, Urine  <1.2         A1C Last 3 Results          3/15/2024    08:53 7/3/2024    08:30 10/3/2024    08:47   HGBA1C Last 3 Results   Hemoglobin A1C 6.3  6.6  6.8           Mammo Diagnostic Right With CAD (02/13/2023 10:35)     Immunization History   Administered Date(s) Administered    COVID-19 (PFIZER) Purple Cap Monovalent 12/21/2020, 01/11/2021, 10/08/2021    Covid-19 (Pfizer) Gray Cap Monovalent 05/13/2022    Fluzone  >6mos 10/03/2024    Fluzone (or Fluarix & Flulaval for VFC) >6mos 11/02/2023    Hepatitis A 04/21/2019, 05/13/2022    Pneumococcal Conjugate 20-Valent (PCV20) 05/17/2023    Shingrix 05/18/2022    Tdap 05/13/2022          Assessment and Plan   Diagnoses and all orders for this visit:    1. Type 2 diabetes mellitus with hyperglycemia, without long-term current use of insulin (Primary)  -     POC Glycosylated Hemoglobin (Hb A1C)  -     Semaglutide (Rybelsus) 3 MG tablet; Take 1 tablet by mouth Every Morning Before Breakfast. On empty stomach 30 min before food or other medication. For 30 days  Dispense: 30 tablet; Refill: 0  -     empagliflozin (Jardiance) 25 MG tablet tablet; Take 1 tablet by mouth Daily.  Dispense: 90 tablet; Refill: 1  -     metFORMIN ER (GLUCOPHAGE-XR) 500 MG 24 hr tablet; Take 2 tablets by mouth 2 (Two) Times a Day.  Dispense: 360 tablet; Refill: 1    2. Visit for screening mammogram    3. Need for vaccination  -     Fluzone >6mos ([id number])          Please call [phone] to schedule your mammogram.    Stop glipizide and Janumet. New diabetes pill Rybelsus 3mg in the morning on empty stomach. Wait 30 minutes before taking other medications or food. Continue Jardiance. New medication Metformin twice a day.     Next month plan to increase Rybelsus to 7mg once a day. The third month increase to Rybelsus 14mg once a day.        Discussed GI side effects to monitor for while initiating Rybelsus.       Follow Up   Return in about 3 months (around 1/3/2025) for Office visit diabetes.  Patient was given instructions and counseling regarding her condition or for health maintenance advice. Please see specific information pulled into the AVS if appropriate.         Electronically signed by Sunitha Tobin MD, 10/03/24, 8:59 AM EDT.

## 2024-10-03 NOTE — TELEPHONE ENCOUNTER
(Key: [id number])  Rybelsus 3MG tablets  Sent to [facility]-Awaiting response  10/3/24

## 2024-10-14 ENCOUNTER — TELEPHONE (OUTPATIENT)
Age: 64
End: 2024-10-14
Payer: COMMERCIAL

## 2024-10-14 DIAGNOSIS — E11.65 TYPE 2 DIABETES MELLITUS WITH HYPERGLYCEMIA, WITHOUT LONG-TERM CURRENT USE OF INSULIN: ICD-10-CM

## 2024-10-14 NOTE — TELEPHONE ENCOUNTER
Caller: Eliz Velazquez    Relationship: Self    Best call back number: [phone]     Which medication are you concerned about: REBELSALANNAH    Who prescribed you this medication: DR. GRIFFIN    When did you start taking this medication: NOT STARTED    What are your concerns: THIS IS NOT COVERED UNDER PATIENT'S INSURANCE. SHE WOULD LIKE TO GET AN ALTERNATIVE.    How long have you had these concerns: 1 DAY

## 2024-10-15 RX ORDER — PIOGLITAZONEHYDROCHLORIDE 15 MG/1
15 TABLET ORAL DAILY
Qty: 90 TABLET | Refills: 1 | Status: SHIPPED | OUTPATIENT
Start: 2024-10-15 | End: 2024-10-22

## 2024-10-15 NOTE — TELEPHONE ENCOUNTER
Name: Eliz Velazquez      Relationship: Self      Best Callback Number: [phone]       HUB PROVIDED THE RELAY MESSAGE FROM THE OFFICE      PATIENT: HAS FURTHER QUESTIONS AND WOULD LIKE A CALL BACK AT THE FOLLOWING PHONE [phone]    ADDITIONAL INFORMATION: PATIENT IS REQUESTING TO SPEAK TO DR COURTNEY PERSONALLY AND SHE WANTS TO GET THE ISSUE RESOLVED WITH HER INSURANCE CONCERNING THE RYBELSUS. SHE DID NOT WANT ALTERNATIVES.

## 2024-10-15 NOTE — TELEPHONE ENCOUNTER
Other medication options are limited.  I would like you to continue taking the metformin ER and the Jardiance.  I am prescribing Januvia and pioglitazone to the pharmacy.  Keep appointment on 1/3/2025 and follow-up sooner if experiencing side effects.

## 2024-10-15 NOTE — TELEPHONE ENCOUNTER
Relay    Other medication options are limited.  I would like you to continue taking the metformin ER and the Jardiance.  I am prescribing Januvia and pioglitazone to the pharmacy.  Keep appointment on 1/3/2025 and follow-up sooner if experiencing side effects.

## 2024-10-16 NOTE — TELEPHONE ENCOUNTER
Left voicemail for patient.    Upon chart review it looks as though the prior authorization was approved for Rybelsus.  If she able to get the Rybelsus from the pharmacy?

## 2024-10-17 NOTE — TELEPHONE ENCOUNTER
Sunitha Howell MD       Upon chart review it looks as though the prior authorization was approved for Rybelsus.  If she able to get the Rybelsus from the pharmacy?

## 2024-10-18 NOTE — TELEPHONE ENCOUNTER
2nd attempt     Left voicemail for patient.     Upon chart review it looks as though the prior authorization was approved for Rybelsus.  If she able to get the Rybelsus from the pharmacy?

## 2024-10-22 RX ORDER — ORAL SEMAGLUTIDE 3 MG/1
3 TABLET ORAL
Qty: 30 TABLET | Refills: 0 | Status: SHIPPED | OUTPATIENT
Start: 2024-10-22

## 2024-10-22 NOTE — TELEPHONE ENCOUNTER
Diabetes medications now will include Rybelsus 3 mg every morning, Jardiance 25 mg daily, and metformin ER 1000 mg 2 times daily.

## 2024-10-22 NOTE — TELEPHONE ENCOUNTER
Diabetes medications now will include Rybelsus 3 mg every morning, Jardiance 25 mg daily, and metformin ER 1000 mg 2 times daily.  Relayed message to pt. Pt verbalized understanding

## 2024-10-30 ENCOUNTER — APPOINTMENT (OUTPATIENT)
Dept: CT IMAGING | Facility: HOSPITAL | Age: 64
End: 2024-10-30
Payer: COMMERCIAL

## 2024-10-30 ENCOUNTER — APPOINTMENT (OUTPATIENT)
Dept: GENERAL RADIOLOGY | Facility: HOSPITAL | Age: 64
End: 2024-10-30
Payer: COMMERCIAL

## 2024-10-30 ENCOUNTER — HOSPITAL ENCOUNTER (EMERGENCY)
Facility: HOSPITAL | Age: 64
Discharge: HOME | End: 2024-10-30
Attending: EMERGENCY MEDICINE | Admitting: EMERGENCY MEDICINE
Payer: COMMERCIAL

## 2024-10-30 VITALS
TEMPERATURE: 98.1 F | RESPIRATION RATE: 18 BRPM | SYSTOLIC BLOOD PRESSURE: 139 MMHG | HEIGHT: 61 IN | DIASTOLIC BLOOD PRESSURE: 74 MMHG | WEIGHT: 228 LBS | OXYGEN SATURATION: 95 % | HEART RATE: 66 BPM | BODY MASS INDEX: 43.05 KG/M2
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
## 2024-10-30 DIAGNOSIS — S32.000A LUMBAR COMPRESSION FRACTURE, CLOSED, INITIAL ENCOUNTER: Primary | ICD-10-CM

## 2024-10-30 LAB
ALBUMIN SERPL-MCNC: 4.2 G/DL (ref 3.5–5.2)
ALBUMIN/GLOB SERPL: 1.3 G/DL
ALP SERPL-CCNC: 121 U/L (ref 39–117)
ALT SERPL W P-5'-P-CCNC: 33 U/L (ref 1–33)
ANION GAP SERPL CALCULATED.3IONS-SCNC: 13 MMOL/L (ref 5–15)
AST SERPL-CCNC: 32 U/L (ref 1–32)
BASOPHILS # BLD AUTO: 0.03 10*3/MM3 (ref 0–0.2)
BASOPHILS NFR BLD AUTO: 0.5 % (ref 0–1.5)
BILIRUB SERPL-MCNC: 0.4 MG/DL (ref 0–1.2)
BUN SERPL-MCNC: 13 MG/DL (ref 8–23)
BUN/CREAT SERPL: 22.8 (ref 7–25)
CALCIUM SPEC-SCNC: 9.2 MG/DL (ref 8.6–10.5)
CHLORIDE SERPL-SCNC: 104 MMOL/L (ref 98–107)
CO2 SERPL-SCNC: 24 MMOL/L (ref 22–29)
CREAT SERPL-MCNC: 0.57 MG/DL (ref 0.57–1)
DEPRECATED RDW RBC AUTO: 41.7 FL (ref 37–54)
EGFRCR SERPLBLD CKD-EPI 2021: 101.6 ML/MIN/1.73
EOSINOPHIL # BLD AUTO: 0.08 10*3/MM3 (ref 0–0.4)
EOSINOPHIL NFR BLD AUTO: 1.3 % (ref 0.3–6.2)
ERYTHROCYTE [DISTWIDTH] IN BLOOD BY AUTOMATED COUNT: 12.8 % (ref 12.3–15.4)
GLOBULIN UR ELPH-MCNC: 3.3 GM/DL
GLUCOSE SERPL-MCNC: 148 MG/DL (ref 65–99)
HCT VFR BLD AUTO: 44.1 % (ref 34–46.6)
HGB BLD-MCNC: 14.6 G/DL (ref 12–15.9)
IMM GRANULOCYTES # BLD AUTO: 0.11 10*3/MM3 (ref 0–0.05)
IMM GRANULOCYTES NFR BLD AUTO: 1.8 % (ref 0–0.5)
LYMPHOCYTES # BLD AUTO: 1.28 10*3/MM3 (ref 0.7–3.1)
LYMPHOCYTES NFR BLD AUTO: 21.3 % (ref 19.6–45.3)
MCH RBC QN AUTO: 29.4 PG (ref 26.6–33)
MCHC RBC AUTO-ENTMCNC: 33.1 G/DL (ref 31.5–35.7)
MCV RBC AUTO: 88.7 FL (ref 79–97)
MONOCYTES # BLD AUTO: 0.42 10*3/MM3 (ref 0.1–0.9)
MONOCYTES NFR BLD AUTO: 7 % (ref 5–12)
NEUTROPHILS NFR BLD AUTO: 4.08 10*3/MM3 (ref 1.7–7)
NEUTROPHILS NFR BLD AUTO: 68.1 % (ref 42.7–76)
NRBC BLD AUTO-RTO: 0 /100 WBC (ref 0–0.2)
PLATELET # BLD AUTO: 221 10*3/MM3 (ref 140–450)
PMV BLD AUTO: 11.4 FL (ref 6–12)
POTASSIUM SERPL-SCNC: 4.6 MMOL/L (ref 3.5–5.2)
PROT SERPL-MCNC: 7.5 G/DL (ref 6–8.5)
RBC # BLD AUTO: 4.97 10*6/MM3 (ref 3.77–5.28)
SODIUM SERPL-SCNC: 141 MMOL/L (ref 136–145)
WBC NRBC COR # BLD AUTO: 6 10*3/MM3 (ref 3.4–10.8)

## 2024-10-30 PROCEDURE — 99285 EMERGENCY DEPT VISIT HI MDM: CPT

## 2024-10-30 PROCEDURE — 36415 COLL VENOUS BLD VENIPUNCTURE: CPT

## 2024-10-30 PROCEDURE — 72125 CT NECK SPINE W/O DYE: CPT

## 2024-10-30 PROCEDURE — 71045 X-RAY EXAM CHEST 1 VIEW: CPT

## 2024-10-30 PROCEDURE — 25010000002 ONDANSETRON PER 1 MG: Performed by: EMERGENCY MEDICINE

## 2024-10-30 PROCEDURE — 96374 THER/PROPH/DIAG INJ IV PUSH: CPT

## 2024-10-30 PROCEDURE — 96375 TX/PRO/DX INJ NEW DRUG ADDON: CPT

## 2024-10-30 PROCEDURE — 72131 CT LUMBAR SPINE W/O DYE: CPT

## 2024-10-30 PROCEDURE — 25510000001 IOPAMIDOL 61 % SOLUTION: Performed by: EMERGENCY MEDICINE

## 2024-10-30 PROCEDURE — 80053 COMPREHEN METABOLIC PANEL: CPT | Performed by: EMERGENCY MEDICINE

## 2024-10-30 PROCEDURE — 25010000002 HYDROMORPHONE PER 4 MG: Performed by: EMERGENCY MEDICINE

## 2024-10-30 PROCEDURE — 74177 CT ABD & PELVIS W/CONTRAST: CPT

## 2024-10-30 PROCEDURE — 85025 COMPLETE CBC W/AUTO DIFF WBC: CPT | Performed by: EMERGENCY MEDICINE

## 2024-10-30 RX ORDER — IOPAMIDOL 612 MG/ML
100 INJECTION, SOLUTION INTRAVASCULAR
Status: COMPLETED | OUTPATIENT
Start: 2024-10-30 | End: 2024-10-30

## 2024-10-30 RX ORDER — SODIUM CHLORIDE 0.9 % (FLUSH) 0.9 %
10 SYRINGE (ML) INJECTION AS NEEDED
Status: DISCONTINUED | OUTPATIENT
Start: 2024-10-30 | End: 2024-10-30 | Stop reason: HOSPADM

## 2024-10-30 RX ORDER — HYDROCODONE BITARTRATE AND ACETAMINOPHEN 5; 325 MG/1; MG/1
1 TABLET ORAL ONCE
Status: COMPLETED | OUTPATIENT
Start: 2024-10-30 | End: 2024-10-30

## 2024-10-30 RX ORDER — ONDANSETRON 2 MG/ML
4 INJECTION INTRAMUSCULAR; INTRAVENOUS ONCE
Status: COMPLETED | OUTPATIENT
Start: 2024-10-30 | End: 2024-10-30

## 2024-10-30 RX ORDER — HYDROMORPHONE HYDROCHLORIDE 1 MG/ML
0.5 INJECTION, SOLUTION INTRAMUSCULAR; INTRAVENOUS; SUBCUTANEOUS ONCE
Status: COMPLETED | OUTPATIENT
Start: 2024-10-30 | End: 2024-10-30

## 2024-10-30 RX ORDER — HYDROCODONE BITARTRATE AND ACETAMINOPHEN 5; 325 MG/1; MG/1
1 TABLET ORAL EVERY 6 HOURS PRN
Qty: 12 TABLET | Refills: 0 | Status: SHIPPED | OUTPATIENT
Start: 2024-10-30 | End: 2024-11-01

## 2024-10-30 RX ADMIN — HYDROMORPHONE HYDROCHLORIDE 0.5 MG: 1 INJECTION, SOLUTION INTRAMUSCULAR; INTRAVENOUS; SUBCUTANEOUS at 10:31

## 2024-10-30 RX ADMIN — ONDANSETRON 4 MG: 2 INJECTION INTRAMUSCULAR; INTRAVENOUS at 10:31

## 2024-10-30 RX ADMIN — HYDROCODONE BITARTRATE AND ACETAMINOPHEN 1 TABLET: 5; 325 TABLET ORAL at 13:20

## 2024-10-30 RX ADMIN — IOPAMIDOL 85 ML: 612 INJECTION, SOLUTION INTRAVENOUS at 11:19
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
## 2024-10-30 NOTE — Clinical Note
[facility] EMERGENCY DEPARTMENT  [address]  [facility] [id number]  Phone: [phone]    Eliz Velazquez was seen and treated in our emergency department on 10/30/2024.  She may return to work on 11/01/2024.         Thank you for choosing [facility].    Onel Sung MD

## 2024-10-31 ENCOUNTER — TELEPHONE (OUTPATIENT)
Age: 64
End: 2024-10-31
Payer: COMMERCIAL

## 2024-11-01 ENCOUNTER — OFFICE VISIT (OUTPATIENT)
Age: 64
End: 2024-11-01
Payer: COMMERCIAL

## 2024-11-01 VITALS
BODY MASS INDEX: 42.55 KG/M2 | HEIGHT: 61 IN | HEART RATE: 74 BPM | OXYGEN SATURATION: 98 % | WEIGHT: 225.38 LBS | SYSTOLIC BLOOD PRESSURE: 140 MMHG | DIASTOLIC BLOOD PRESSURE: 88 MMHG | RESPIRATION RATE: 18 BRPM

## 2024-11-01 DIAGNOSIS — R22.1 THROAT SWELLING: ICD-10-CM

## 2024-11-01 DIAGNOSIS — M54.50 ACUTE MIDLINE LOW BACK PAIN WITHOUT SCIATICA: Primary | ICD-10-CM

## 2024-11-01 DIAGNOSIS — S32.030D CLOSED COMPRESSION FRACTURE OF L3 LUMBAR VERTEBRA WITH ROUTINE HEALING, SUBSEQUENT ENCOUNTER: ICD-10-CM

## 2024-11-01 PROBLEM — S32.030A CLOSED COMPRESSION FRACTURE OF THIRD LUMBAR VERTEBRA: Status: ACTIVE | Noted: 2024-11-01

## 2024-11-01 PROCEDURE — 99214 OFFICE O/P EST MOD 30 MIN: CPT | Performed by: FAMILY MEDICINE

## 2024-11-01 RX ORDER — HYDROCODONE BITARTRATE AND ACETAMINOPHEN 7.5; 325 MG/1; MG/1
1 TABLET ORAL EVERY 6 HOURS PRN
Qty: 28 TABLET | Refills: 0 | Status: SHIPPED | OUTPATIENT
Start: 2024-11-01
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
## 2024-11-04 ENCOUNTER — TELEPHONE (OUTPATIENT)
Age: 64
End: 2024-11-04
Payer: COMMERCIAL

## 2024-11-04 NOTE — TELEPHONE ENCOUNTER
Spoke with Heydi she stated that they were unable to get patient in for 1 week after provider reviews records. Was not able to adivse to go to ER due to not being seen there       Spoke with patient , she stated her pain is increasing . Spoke with pcp she stated if pain is increasing to go to ER .and see if the neurosurgery can put her on a cancellation list.   Informed patient she stated if her gets treated like she did before. She didn't watnt to go . She is waiting on a ride for someone to take her

## 2024-11-04 NOTE — TELEPHONE ENCOUNTER
Called Heydi santacruz [facility] to return call     Relay   Heydi from [facility] called. She left a VM for them over the weekend about being in pain but since they havent seen her yet, they can't do anything for her yet      Please call Heydi santacruz at [phone]

## 2024-11-04 NOTE — TELEPHONE ENCOUNTER
Heydi from [facility] called. She left a VM for them over the weekend about being in pain but since they havent seen her yet, they can't do anything for her yet     Please call Heydi back at [phone]

## 2024-11-05 ENCOUNTER — TELEPHONE (OUTPATIENT)
Age: 64
End: 2024-11-05
Payer: COMMERCIAL

## 2024-11-05 NOTE — TELEPHONE ENCOUNTER
Spoke with Heydi at Neurosurgery   Was asking if spoke with patient. Inform I have not had a chance to speak with her today.   She stated as soon as she get the review from the doctor she can get patient in for appt.

## 2024-11-12 ENCOUNTER — OFFICE VISIT (OUTPATIENT)
Dept: NEUROSURGERY | Facility: CLINIC | Age: 64
End: 2024-11-12
Payer: COMMERCIAL

## 2024-11-12 VITALS
SYSTOLIC BLOOD PRESSURE: 142 MMHG | HEIGHT: 61 IN | BODY MASS INDEX: 42.86 KG/M2 | DIASTOLIC BLOOD PRESSURE: 90 MMHG | TEMPERATURE: 98 F | WEIGHT: 227 LBS

## 2024-11-12 DIAGNOSIS — S32.030A CLOSED COMPRESSION FRACTURE OF L3 VERTEBRA, INITIAL ENCOUNTER: Primary | ICD-10-CM

## 2024-11-12 PROCEDURE — 99204 OFFICE O/P NEW MOD 45 MIN: CPT | Performed by: PHYSICIAN ASSISTANT

## 2024-11-12 RX ORDER — TRAMADOL HYDROCHLORIDE 50 MG/1
50 TABLET ORAL EVERY 6 HOURS PRN
Qty: 30 TABLET | Refills: 0 | Status: SHIPPED | OUTPATIENT
Start: 2024-11-12
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
## 2024-11-15 ENCOUNTER — OFFICE VISIT (OUTPATIENT)
Age: 64
End: 2024-11-15
Payer: COMMERCIAL

## 2024-11-15 VITALS
BODY MASS INDEX: 43.43 KG/M2 | RESPIRATION RATE: 16 BRPM | HEIGHT: 61 IN | DIASTOLIC BLOOD PRESSURE: 82 MMHG | WEIGHT: 230 LBS | OXYGEN SATURATION: 98 % | SYSTOLIC BLOOD PRESSURE: 140 MMHG | HEART RATE: 82 BPM

## 2024-11-15 DIAGNOSIS — S32.030D CLOSED COMPRESSION FRACTURE OF L3 LUMBAR VERTEBRA WITH ROUTINE HEALING, SUBSEQUENT ENCOUNTER: ICD-10-CM

## 2024-11-15 DIAGNOSIS — M54.50 ACUTE MIDLINE LOW BACK PAIN WITHOUT SCIATICA: Primary | ICD-10-CM

## 2024-11-15 DIAGNOSIS — M25.551 RIGHT HIP PAIN: ICD-10-CM

## 2024-11-15 DIAGNOSIS — Z51.81 THERAPEUTIC DRUG MONITORING: ICD-10-CM

## 2024-11-15 DIAGNOSIS — Z79.899 CONTROLLED SUBSTANCE AGREEMENT SIGNED: ICD-10-CM

## 2024-11-15 RX ORDER — HYDROCODONE BITARTRATE AND ACETAMINOPHEN 7.5; 325 MG/1; MG/1
1 TABLET ORAL EVERY 6 HOURS PRN
Qty: 120 TABLET | Refills: 0 | Status: SHIPPED | OUTPATIENT
Start: 2024-11-15

## 2024-11-15 NOTE — PROGRESS NOTES
"Chief Complaint  Back Pain    Subjective        Eliz Velazquez presents to [facility]  History of Present Illness    History of Present Illness  The patient is a 63-year-old female following up for her back pain.    She reports that hydrocodone acetaminophen 7.5/325 mg has been effective in managing her pain. However, she experiences difficulty bending over and requires assistance with personal hygiene tasks such as wiping after using the bathroom and showering. She uses a rollator walker for support when standing up.    Her sleep is disturbed due to the need to sleep on her right side, as sleeping on her left side causes discomfort in her right hip. She also struggles with lifting her right leg, which hinders her ability to get into a car or lift objects.    Objective   Vital Signs:  /82 (BP Location: Right arm, Patient Position: Sitting, Cuff Size: Adult)   Pulse 82   Resp 16   Ht 154.9 cm (61\")   Wt 104 kg (230 lb)   SpO2 98%   BMI 43.46 kg/m²   Estimated body mass index is 43.46 kg/m² as calculated from the following:    Height as of this encounter: 154.9 cm (61\").    Weight as of this encounter: 104 kg (230 lb).            The following portions of the patient's history were reviewed and updated as appropriate: allergies, current medications, past family history, past medical history, past social history, past surgical history, and problem list.       Physical Exam  Vitals reviewed.   Constitutional:       General: She is not in acute distress.     Appearance: She is not ill-appearing, toxic-appearing or diaphoretic.   Cardiovascular:      Rate and Rhythm: Normal rate and regular rhythm.   Pulmonary:      Effort: Pulmonary effort is normal.      Breath sounds: Normal breath sounds.   Musculoskeletal:      Comments: Increased pain moving from standing to sitting   Neurological:      Mental Status: She is alert.      Gait: Gait abnormal (slow and steady). "   Psychiatric:         Mood and Affect: Mood normal.        Result Review :  The following data was reviewed by: Sunitha Tobin MD on 11/15/2024:         Office Visit with Scar Medina PA-C (11/12/2024)      Assessment and Plan   Diagnoses and all orders for this visit:    1. Acute midline low back pain without sciatica (Primary)  -     HYDROcodone-acetaminophen (Norco) 7.5-325 MG per tablet; Take 1 tablet by mouth Every 6 (Six) Hours As Needed for Severe Pain.  Dispense: 120 tablet; Refill: 0    2. Closed compression fracture of L3 lumbar vertebra with routine healing, subsequent encounter  -     HYDROcodone-acetaminophen (Norco) 7.5-325 MG per tablet; Take 1 tablet by mouth Every 6 (Six) Hours As Needed for Severe Pain.  Dispense: 120 tablet; Refill: 0    3. Therapeutic drug monitoring  -     Cancel: ToxAssure Flex 23, Ur -; Future  -     Cancel: Drug Analysis,Comp,Oral Fluid - Saliva,; Future  -     ToxAssure Flex 23, Ur -; Future    4. Controlled substance agreement signed    5. Right hip pain  -     XR Hip With or Without Pelvis 2 - 3 View Right; Future             Assessment & Plan  1. Back pain.  The patient is following up for back pain due to a compression fracture. She reports that the hydrocodone acetaminophen 7.5/325 mg every 6 hours as needed for pain has been effective. The prescription will be renewed for another month.  Brian report reviewed.  Urine drug screen collected.  She has difficulty with daily activities such as bending, using the toilet, and showering, and requires assistance. She uses a rollator walker for support and has trouble sleeping due to pain. A drug screening test will be conducted, and she has been advised to abstain from alcohol while on narcotics. A controlled substance agreement was signed. If back pain persists after a month, a follow-up appointment will be scheduled for further evaluation and potential renewal of the prescription.  [facility] paperwork will be extended for  an additional 1 month.    2. Right hip pain.  The patient reports difficulty raising her right leg and requires assistance to get into a car. An x-ray of the right hip will be ordered to evaluate the pain.    Follow-up  Return in 1 month for follow-up.      Follow Up   Return in about 1 month (around 12/15/2024) for Office visit back pain.  Patient was given instructions and counseling regarding her condition or for health maintenance advice. Please see specific information pulled into the AVS if appropriate.         Patient or patient representative verbalized consent for the use of Ambient Listening during the visit with  Sunitha Tobin MD for chart documentation. 11/15/2024  10:38 EST    Electronically signed by Sunitha Tobin MD, 11/15/24, 10:48 AM EST.

## 2024-11-19 ENCOUNTER — TELEPHONE (OUTPATIENT)
Dept: NEUROSURGERY | Facility: CLINIC | Age: 64
End: 2024-11-19
Payer: COMMERCIAL

## 2024-11-19 ENCOUNTER — PATIENT OUTREACH (OUTPATIENT)
Dept: CASE MANAGEMENT | Facility: OTHER | Age: 64
End: 2024-11-19
Payer: COMMERCIAL

## 2024-11-19 LAB
1OH-MIDAZOLAM UR QL SCN: NOT DETECTED NG/MG CREAT
6MAM UR QL SCN: NEGATIVE NG/ML
7AMINOCLONAZEPAM/CREAT UR: NOT DETECTED NG/MG CREAT
A-OH ALPRAZ/CREAT UR: NOT DETECTED NG/MG CREAT
A-OH-TRIAZOLAM/CREAT UR CFM: NOT DETECTED NG/MG CREAT
ACP UR QL CFM: NOT DETECTED
ALPRAZ/CREAT UR CFM: NOT DETECTED NG/MG CREAT
AMPHETAMINES UR QL SCN: NEGATIVE NG/ML
APAP UR QL SCN: NORMAL UG/ML
APAP UR QL: NORMAL
APAP UR-MCNC: PRESENT UG/ML
BARBITURATES UR QL SCN: NEGATIVE NG/ML
BENZODIAZ SCN METH UR: NEGATIVE
BUPRENORPHINE UR QL SCN: NEGATIVE
BUPRENORPHINE/CREAT UR: NOT DETECTED NG/MG CREAT
CANNABINOIDS UR QL SCN: NEGATIVE NG/ML
CARISOPRODOL UR QL: NEGATIVE NG/ML
CLONAZEPAM/CREAT UR CFM: NOT DETECTED NG/MG CREAT
COCAINE+BZE UR QL SCN: NEGATIVE NG/ML
CODEINE UR CFM-MCNC: NOT DETECTED NG/MG CREAT
CREAT UR-MCNC: 80 MG/DL
D-METHORPHAN UR-MCNC: NOT DETECTED NG/ML
D-METHORPHAN+LEVORPHANOL UR QL: NOT DETECTED
DESALKYLFLURAZ/CREAT UR: NOT DETECTED NG/MG CREAT
DHC/CREAT UR: NOT DETECTED NG/MG CREAT
DIAZEPAM/CREAT UR: NOT DETECTED NG/MG CREAT
ETHANOL UR QL SCN: NEGATIVE G/DL
ETHANOL UR QL SCN: NEGATIVE NG/ML
FENTANYL CTO UR SCN-MCNC: NEGATIVE NG/ML
FENTANYL/CREAT UR: NOT DETECTED NG/MG CREAT
FLUNITRAZEPAM UR QL SCN: NOT DETECTED NG/MG CREAT
GABAPENTIN UR-MCNC: NEGATIVE UG/ML
HALLUCINOGENS UR: NEGATIVE
HYDROCODONE UR CFM-MCNC: NOT DETECTED NG/MG CREAT
HYDROMORPHONE UR CFM-MCNC: NOT DETECTED NG/MG CREAT
HYPNOTICS UR QL SCN: NEGATIVE
KETAMINE UR QL: NOT DETECTED
LORAZEPAM/CREAT UR: NOT DETECTED NG/MG CREAT
MEPERIDINE UR QL SCN: NEGATIVE NG/ML
METHADONE UR QL SCN: NEGATIVE NG/ML
METHADONE+METAB UR QL SCN: NEGATIVE NG/ML
MIDAZOLAM/CREAT UR CFM: NOT DETECTED NG/MG CREAT
MISCELLANEOUS, UR: NEGATIVE
MORPHINE UR CFM-MCNC: NOT DETECTED NG/MG CREAT
NORBUPRENORPHINE/CREAT UR: NOT DETECTED NG/MG CREAT
NORCODEINE/CREAT UR CFM: NOT DETECTED NG/MG CREAT
NORDIAZEPAM/CREAT UR: NOT DETECTED NG/MG CREAT
NORFENTANYL/CREAT UR: NOT DETECTED NG/MG CREAT
NORFLUNITRAZEPAM UR-MCNC: NOT DETECTED NG/MG CREAT
NORHYDROCODONE UR CFM-MCNC: NOT DETECTED NG/MG CREAT
NORKETAMINE UR-MCNC: NOT DETECTED UG/ML
NORMORPHINE UR-MCNC: NOT DETECTED NG/MG CREAT
NOROXYCODONE UR CFM-MCNC: 3461 NG/MG CREAT
NOROXYMORPHONE/CREAT UR CFM: 396 NG/MG CREAT
OPIATES UR QL CFM: NEGATIVE
OPIATES UR SCN-MCNC: NORMAL NG/ML
OXAZEPAM/CREAT UR: NOT DETECTED NG/MG CREAT
OXYCODONE CTO UR SCN-MCNC: NORMAL NG/ML
OXYCODONE UR CFM-MCNC: 2154 NG/MG CREAT
OXYCODONE UR QL CFM: NORMAL
OXYMORPHONE UR CFM-MCNC: 973 NG/MG CREAT
PCP UR QL SCN: NEGATIVE NG/ML
PRESCRIBED MEDICATIONS: NORMAL
PROPOXYPH UR QL SCN: NEGATIVE NG/ML
TAPENTADOL CTO UR SCN-MCNC: NEGATIVE NG/ML
TEMAZEPAM/CREAT UR: NOT DETECTED NG/MG CREAT
TRAMADOL UR QL SCN: NEGATIVE NG/ML
ZALEPLON UR-MCNC: NOT DETECTED NG/ML
ZOLPIDEM PHENYL-4-CARB UR QL SCN: NOT DETECTED
ZOLPIDEM UR QL SCN: NOT DETECTED
ZOPICLONE-N-OXIDE UR-MCNC: NOT DETECTED NG/ML

## 2024-11-19 NOTE — PLAN OF CARE
Problem: Nonspecific Low Back Pain  Goal: Stay Active and Independent  Outcome: Not Progressing  Goal: Return to Work or School  Outcome: Not Progressing  Intervention: My Return to Work or School To Do List  Description:   Why is this important?  You might be worried about going back to work after a back injury.  You may wonder if your back will get worse if you do.  You might also think that you can't do what you used to do.  Going back to work is possible.  You, your doctor and employer will need to work together to figure how to make it OK for you.    Flowsheets (Taken 11/19/2024 1410)  My Return to Work or School To Do List:   avoid bending at the waist   set an alarm to remind myself to move     Problem: Nonspecific Low Back Pain  Goal: [place] with Pain  Outcome: Progressing  Intervention: My Low Back Pain Coping To Do List  Description:   Why is this important?  Living with back pain and enjoying your life may be hard.  Feelings, such as depression or anger, can make your pain worse.  Learning ways to cope may help you find some relief from the pain.    Flowsheets (Taken 11/19/2024 1410)  My Low Back Pain Coping To Do List:   use relaxation during pain   use distraction techniques  Goal: Keep Pain Under Control  Outcome: Progressing  Intervention: My Low Back Pain Control To Do List  Description:   Why is this important?  Day-to-day life can be hard when you have back pain.  Pain medicine is just one piece of the treatment puzzle. There are many things you can do to manage pain and keep your back strong.  Lifestyle changes, like stopping smoking and eating foods with Vitamin D and calcium, keep your bones and muscles healthy. Your back is better when it is supported by strong muscles.  You can try these action steps to help you manage your pain.    Flowsheets (Taken 11/19/2024 1410)  My Low Back Pain Control To Do List:   call for medicine refill 2 or 3 days before it runs out   start a pain diary   track  what makes the pain worse and what makes it better  Goal: Optimal Care Coordination of a Patient Experiencing Nonspecific Low Back Pain  Outcome: Progressing  Intervention: Partner to Develop Acute Low Back Pain Plan  Flowsheets (Taken 11/19/2024 1410)  Partner to Develop Acute Low Back Pain Plan:   effectiveness of pharmacologic therapy monitored   pain assessed  Intervention: Support Psychosocial Adjustment to Low Back Pain  Flowsheets (Taken 11/19/2024 1410)  Support Psychosocial Adjustment to Low Back Pain:   active listening utilized   positive reinforcement provided   relaxation techniques promoted  Intervention: Partner to Develop Chronic Low Back Pain Plan  Flowsheets (Taken 11/19/2024 1410)  Partner to Develop Chronic Low Back Pain Plan: pain assessed  Intervention: Maintain or Improve Strength and Functional Ability  Flowsheets (Taken 11/19/2024 1410)  Maintain or Improve Strength and Functional Ability: assistive or adaptive device use encouraged

## 2024-11-19 NOTE — TELEPHONE ENCOUNTER
I spoke to the PT and let her know that her PCP is within [facility] so she will be able to see the notes from our office.She was thankful for the call
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
## 2024-11-19 NOTE — TELEPHONE ENCOUNTER
Caller: PATIENT    Relationship: SELF    Best call back number: [phone]    What form or medical record are you requesting: LAST OFFICE NOTES-TIME OFF NEEDED, WORK RECOMMENDATIONS    Who is requesting this form or medical record from you: PCP OFFICE     How would you like to receive the form or medical records (pick-up, mail, fax):     If fax, what is the fax number:     f mail, what is the address:   If pick-up, provide patient with address and location details    Timeframe paperwork needed:     Additional notes: PATIENT CALLED AND STATES SHE IS NEEDING OUR OFFICE TO FAX LAST OFFICE NOTE AND WORK RECOMMENDATIONS AND TIME OFF TO HER PCP OFFICE AS THEY ARE WHO 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

## 2024-11-20 ENCOUNTER — TELEPHONE (OUTPATIENT)
Age: 64
End: 2024-11-20
Payer: COMMERCIAL

## 2024-11-20 NOTE — TELEPHONE ENCOUNTER
Spoke with patient informed paperwork from [facility] completed and faxed.    She stated there was paperwork scanned in from neurosurgery to review in the chart.

## 2024-12-12 ENCOUNTER — OFFICE VISIT (OUTPATIENT)
Dept: NEUROSURGERY | Facility: CLINIC | Age: 64
End: 2024-12-12
Payer: COMMERCIAL

## 2024-12-12 VITALS
DIASTOLIC BLOOD PRESSURE: 86 MMHG | HEIGHT: 61 IN | TEMPERATURE: 97 F | WEIGHT: 229 LBS | BODY MASS INDEX: 43.23 KG/M2 | SYSTOLIC BLOOD PRESSURE: 136 MMHG

## 2024-12-12 DIAGNOSIS — S32.030D CLOSED COMPRESSION FRACTURE OF L3 LUMBAR VERTEBRA WITH ROUTINE HEALING, SUBSEQUENT ENCOUNTER: Primary | ICD-10-CM

## 2024-12-12 PROCEDURE — 99213 OFFICE O/P EST LOW 20 MIN: CPT | Performed by: PHYSICIAN ASSISTANT
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
## 2024-12-17 ENCOUNTER — OFFICE VISIT (OUTPATIENT)
Age: 64
End: 2024-12-17
Payer: COMMERCIAL

## 2024-12-17 VITALS
OXYGEN SATURATION: 98 % | DIASTOLIC BLOOD PRESSURE: 80 MMHG | SYSTOLIC BLOOD PRESSURE: 132 MMHG | HEIGHT: 61 IN | BODY MASS INDEX: 43.23 KG/M2 | WEIGHT: 229 LBS | HEART RATE: 83 BPM

## 2024-12-17 DIAGNOSIS — S32.030D CLOSED COMPRESSION FRACTURE OF L3 LUMBAR VERTEBRA WITH ROUTINE HEALING, SUBSEQUENT ENCOUNTER: ICD-10-CM

## 2024-12-17 DIAGNOSIS — M54.50 ACUTE MIDLINE LOW BACK PAIN WITHOUT SCIATICA: Primary | ICD-10-CM

## 2024-12-17 PROCEDURE — 99214 OFFICE O/P EST MOD 30 MIN: CPT | Performed by: FAMILY MEDICINE

## 2024-12-17 RX ORDER — HYDROCODONE BITARTRATE AND ACETAMINOPHEN 7.5; 325 MG/1; MG/1
1 TABLET ORAL EVERY 12 HOURS PRN
Qty: 60 TABLET | Refills: 0 | Status: SHIPPED | OUTPATIENT
Start: 2024-12-17
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
## 2024-12-19 ENCOUNTER — TELEPHONE (OUTPATIENT)
Age: 64
End: 2024-12-19
Payer: COMMERCIAL

## 2024-12-19 ENCOUNTER — TELEPHONE (OUTPATIENT)
Dept: NEUROSURGERY | Facility: CLINIC | Age: 64
End: 2024-12-19
Payer: COMMERCIAL

## 2024-12-19 NOTE — TELEPHONE ENCOUNTER
Caller: PATIENT    Relationship to patient: SELF    Best call back number: [phone]    Patient is needing: PATIENT CALLED TO ADVISE THAT [employer] WILL BE SENDING A REQUEST FOR RECORDS FOR PATIENT-SENDING TO OFFICE TO ADVISE PER PATIENT THANK YOU

## 2024-12-26 ENCOUNTER — TELEMEDICINE (OUTPATIENT)
Dept: NEUROSURGERY | Facility: CLINIC | Age: 64
End: 2024-12-26
Payer: COMMERCIAL

## 2024-12-26 DIAGNOSIS — S32.030D CLOSED COMPRESSION FRACTURE OF L3 LUMBAR VERTEBRA WITH ROUTINE HEALING, SUBSEQUENT ENCOUNTER: Primary | ICD-10-CM

## 2024-12-26 PROCEDURE — 99213 OFFICE O/P EST LOW 20 MIN: CPT | Performed by: PHYSICIAN ASSISTANT

## 2024-12-26 NOTE — LETTER
December 26, 2024      [facility] NEUROSURGERY  [address]    [facility] [address]  [phone]          Patient: Eliz Velazquez   YOB: 1960   Date of Visit: 12/26/2024       To Whom It May Concern:    Eliz Velazquez was seen at [facility] NEUROS[facility]ERY on 12/26/2024.    Patient is doing better having less pain but still limited in the amount of bending lifting and twisting she can do.  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  Hopefully by February she would be able to get back to full duty.     Patient will return to light duty on January 15 after seeing her primary care provider.      We plan on seeing her back in February to see if she is able to do full duty.      Sincerely,       Sacr Medina PA-C
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
## 2025-01-03 ENCOUNTER — OFFICE VISIT (OUTPATIENT)
Age: 65
End: 2025-01-03
Payer: COMMERCIAL

## 2025-01-03 VITALS
BODY MASS INDEX: 42.69 KG/M2 | OXYGEN SATURATION: 96 % | SYSTOLIC BLOOD PRESSURE: 128 MMHG | WEIGHT: 226.13 LBS | HEIGHT: 61 IN | DIASTOLIC BLOOD PRESSURE: 82 MMHG | HEART RATE: 76 BPM

## 2025-01-03 DIAGNOSIS — E11.65 TYPE 2 DIABETES MELLITUS WITH HYPERGLYCEMIA, WITHOUT LONG-TERM CURRENT USE OF INSULIN: Primary | ICD-10-CM

## 2025-01-03 DIAGNOSIS — M54.2 ACUTE NECK PAIN: ICD-10-CM

## 2025-01-03 DIAGNOSIS — M62.838 MUSCLE SPASM: ICD-10-CM

## 2025-01-03 LAB
EXPIRATION DATE: ABNORMAL
HBA1C MFR BLD: 8.3 % (ref 4.5–5.7)
Lab: ABNORMAL

## 2025-01-03 PROCEDURE — 83036 HEMOGLOBIN GLYCOSYLATED A1C: CPT | Performed by: FAMILY MEDICINE

## 2025-01-03 PROCEDURE — 99214 OFFICE O/P EST MOD 30 MIN: CPT | Performed by: FAMILY MEDICINE

## 2025-01-03 RX ORDER — CYCLOBENZAPRINE HCL 10 MG
10 TABLET ORAL 2 TIMES DAILY PRN
Qty: 60 TABLET | Refills: 1 | Status: SHIPPED | OUTPATIENT
Start: 2025-01-03

## 2025-01-03 RX ORDER — METFORMIN HYDROCHLORIDE 500 MG/1
1000 TABLET, EXTENDED RELEASE ORAL 2 TIMES DAILY
Qty: 360 TABLET | Refills: 1 | Status: SHIPPED | OUTPATIENT
Start: 2025-01-03

## 2025-01-03 NOTE — PATIENT INSTRUCTIONS
Take Rybelsus 3mg in the morning without food or other medications. Wait 30 minutes and then take other medications and food. Continue Jardiance 25mg in the morning. Take metformin 500mg 2 pills 2 times a day.

## 2025-01-03 NOTE — PROGRESS NOTES
"Chief Complaint  Diabetes    Subjective        Eliz Velazquez presents to [facility]IMARY [facility]ARE  History of Present Illness    History of Present Illness  The patient is a 64-year-old female presenting for a diabetes follow-up.    She has been using metformin, taking two doses in the morning. Additionally, she administers Jardiance 25 mg during the morning hours. However, she has not initiated treatment with Rybelsus.    She reports experiencing cramping sensations on the left side of her neck and cheek, which she is unable to alleviate. She had an episode of this cramping three days ago, during which she experienced pain behind her ear, similar to a brain freeze. The area felt extremely tense, necessitating her to stretch her jaw for approximately one minute. The pain radiated up to the left side of her head, leaving her with a sensation akin to having her hair pulled back tightly once the episode subsided. She has not experienced such an episode for a significant period. Previously, she would occasionally experience these symptoms at night, disrupting her sleep and requiring her to inform her  about the discomfort in her neck.        Objective   Vital Signs:  /82   Pulse 76   Ht 154.9 cm (61\")   Wt 103 kg (226 lb 2 oz)   SpO2 96%   BMI 42.73 kg/m²   Estimated body mass index is 42.73 kg/m² as calculated from the following:    Height as of this encounter: 154.9 cm (61\").    Weight as of this encounter: 103 kg (226 lb 2 oz).            The following portions of the patient's history were reviewed and updated as appropriate: allergies, current medications, past family history, past medical history, past social history, past surgical history, and problem list.       Physical Exam  Vitals reviewed.   Constitutional:       General: She is not in acute distress.     Appearance: She is obese. She is not ill-appearing.   Cardiovascular:      Rate and Rhythm: Normal rate and regular " rhythm.   Pulmonary:      Effort: Pulmonary effort is normal.      Breath sounds: Normal breath sounds.   Musculoskeletal:      Cervical back: Normal range of motion.   Neurological:      Mental Status: She is alert.      Cranial Nerves: No facial asymmetry.        Result Review :  The following data was reviewed by: Sunitha Tobin MD on 01/03/2025:  Common labs          10/3/2024    08:47 10/30/2024    10:22 1/3/2025    10:24   Common Labs   Glucose  148     BUN  13     Creatinine  0.57     Sodium  141     Potassium  4.6     Chloride  104     Calcium  9.2     Albumin  4.2     Total Bilirubin  0.4     Alkaline Phosphatase  121     AST (SGOT)  32     ALT (SGPT)  33     WBC  6.00     Hemoglobin  14.6     Hematocrit  44.1     Platelets  221     Hemoglobin A1C 6.8   8.3      A1C Last 3 Results          7/3/2024    08:30 10/3/2024    08:47 1/3/2025    10:24   HGBA1C Last 3 Results   Hemoglobin A1C 6.6  6.8  8.3                Assessment and Plan   Diagnoses and all orders for this visit:    1. Type 2 diabetes mellitus with hyperglycemia, without long-term current use of insulin (Primary)  -     POC Glycosylated Hemoglobin (Hb A1C)  -     metFORMIN ER (GLUCOPHAGE-XR) 500 MG 24 hr tablet; Take 2 tablets by mouth 2 (Two) Times a Day.  Dispense: 360 tablet; Refill: 1  -     empagliflozin (Jardiance) 25 MG tablet tablet; Take 1 tablet by mouth Daily.  Dispense: 90 tablet; Refill: 1    2. Acute neck pain  -     cyclobenzaprine (FLEXERIL) 10 MG tablet; Take 1 tablet by mouth 2 (Two) Times a Day As Needed for Muscle Spasms.  Dispense: 60 tablet; Refill: 1    3. Muscle spasm  -     cyclobenzaprine (FLEXERIL) 10 MG tablet; Take 1 tablet by mouth 2 (Two) Times a Day As Needed for Muscle Spasms.  Dispense: 60 tablet; Refill: 1             Assessment & Plan  1. Diabetes Mellitus.  Her A1c levels have shown a significant increase from 6.8 to 8.3, indicating a need for more stringent glycemic control. She will begin metformin 1000  mg twice daily as prescribed instead of once daily and Jardiance 25 mg once daily. She is advised to start Rybelsus 3 mg daily, taken on an empty stomach first thing in the morning, followed by a 30-minute interval before consuming food or other medications. The dosage of Rybelsus will be gradually increased to 7 mg after one month, and subsequently to 14 mg, to mitigate potential side effects such as nausea, vomiting, abdominal pain, and changes in bowel habits. A prescription for a 30-day supply of Rybelsus has been provided. She is instructed to contact the clinic or have the pharmacy notify us when a refill is required, at which point the dosage will be increased to 7 mg. Her A1c levels will be reassessed in 3 months following these medication adjustments.    2. Muscle Spasms.  A prescription for cyclobenzaprine has been issued, to be taken as needed during episodes of muscle spasms.    Follow-up  The patient will follow up in 3 months.      Follow Up   Return in about 3 months (around 4/3/2025) for Office visit diabetes.  Patient was given instructions and counseling regarding her condition or for health maintenance advice. Please see specific information pulled into the AVS if appropriate.         Patient or patient representative verbalized consent for the use of Ambient Listening during the visit with  Sunitha Tobin MD for chart documentation. 1/3/2025  10:42 EST    Electronically signed by Sunitha Tobin MD, 01/03/25, 10:42 AM EST.

## 2025-01-17 ENCOUNTER — OFFICE VISIT (OUTPATIENT)
Age: 65
End: 2025-01-17
Payer: COMMERCIAL

## 2025-01-17 ENCOUNTER — TELEPHONE (OUTPATIENT)
Age: 65
End: 2025-01-17

## 2025-01-17 VITALS
WEIGHT: 226 LBS | HEIGHT: 61 IN | SYSTOLIC BLOOD PRESSURE: 130 MMHG | OXYGEN SATURATION: 97 % | HEART RATE: 88 BPM | BODY MASS INDEX: 42.67 KG/M2 | DIASTOLIC BLOOD PRESSURE: 76 MMHG

## 2025-01-17 DIAGNOSIS — S32.030D CLOSED COMPRESSION FRACTURE OF L3 LUMBAR VERTEBRA WITH ROUTINE HEALING, SUBSEQUENT ENCOUNTER: ICD-10-CM

## 2025-01-17 DIAGNOSIS — E11.65 TYPE 2 DIABETES MELLITUS WITH HYPERGLYCEMIA, WITHOUT LONG-TERM CURRENT USE OF INSULIN: ICD-10-CM

## 2025-01-17 DIAGNOSIS — M54.50 ACUTE MIDLINE LOW BACK PAIN WITHOUT SCIATICA: Primary | ICD-10-CM

## 2025-01-17 PROCEDURE — 99213 OFFICE O/P EST LOW 20 MIN: CPT | Performed by: FAMILY MEDICINE

## 2025-01-17 RX ORDER — ORAL SEMAGLUTIDE 3 MG/1
3 TABLET ORAL
Qty: 30 TABLET | Refills: 1 | Status: SHIPPED | OUTPATIENT
Start: 2025-01-17

## 2025-01-17 RX ORDER — HYDROCODONE BITARTRATE AND ACETAMINOPHEN 7.5; 325 MG/1; MG/1
.5-1 TABLET ORAL EVERY 12 HOURS PRN
Qty: 30 TABLET | Refills: 0 | Status: SHIPPED | OUTPATIENT
Start: 2025-01-17

## 2025-01-17 NOTE — LETTER
January 17, 2025     Patient: Eliz Velazquez   YOB: 1960   Date of Visit: 1/17/2025       To Whom It May Concern:    It is my medical opinion that Eliz Velazquez may return to work on 1/20/2025. 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          Sincerely,        Sunitha Tobin MD    CC: No Recipients
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
## 2025-01-20 ENCOUNTER — PRIOR AUTHORIZATION (OUTPATIENT)
Age: 65
End: 2025-01-20
Payer: COMMERCIAL

## 2025-01-20 NOTE — TELEPHONE ENCOUNTER
Key: [id number]    Drug:  HYDROcodone-Acetaminophen 7.5-325MG tablets    Faxed to [facility]-Awaiting response  1/20/25

## 2025-01-23 NOTE — TELEPHONE ENCOUNTER
Please submit prior authorization.  I have reviewed the prescription drug monitoring program on 1/17/2025 and 1/23/2025.  She has a controlled substance agreement on file with me from 11/15/2024 and has been following regularly with me for pain management.  She was seen and evaluated on 11/1/2024, 11/15/2024, 12/17/2024 and 1/17/2025 for pain management.

## 2025-01-23 NOTE — TELEPHONE ENCOUNTER
Drug:  HYDROcodone-Acetaminophen 7.5-325MG tablets has been denied by plan.    -See denial letter in chart

## 2025-01-27 DIAGNOSIS — S32.030D CLOSED COMPRESSION FRACTURE OF L3 LUMBAR VERTEBRA WITH ROUTINE HEALING, SUBSEQUENT ENCOUNTER: ICD-10-CM

## 2025-01-27 DIAGNOSIS — M54.50 ACUTE MIDLINE LOW BACK PAIN WITHOUT SCIATICA: ICD-10-CM

## 2025-01-27 RX ORDER — HYDROCODONE BITARTRATE AND ACETAMINOPHEN 7.5; 325 MG/1; MG/1
.5-1 TABLET ORAL EVERY 12 HOURS PRN
Qty: 60 TABLET | Refills: 0 | Status: SHIPPED | OUTPATIENT
Start: 2025-01-27

## 2025-01-27 NOTE — TELEPHONE ENCOUNTER
Drug:  HYDROcodone-Acetaminophen 7.5-325MG tablets has now been approved by plan.  See letter in chart.

## 2025-01-27 NOTE — TELEPHONE ENCOUNTER
Rx Refill Note  Requested Prescriptions     Pending Prescriptions Disp Refills    HYDROcodone-acetaminophen (Norco) 7.5-325 MG per tablet 30 tablet 0     Sig: Take 0.5-1 tablet by mouth Every 12 (Twelve) Hours As Needed for Severe Pain.      Last office visit with prescribing clinician: 1/17/2025   Last telemedicine visit with prescribing clinician: Visit date not found   Next office visit with prescribing clinician: 4/4/2025     Ariane Villalpando MA  01/27/25, 13:26 EST    CSA-11/15/24  UDS-11/15/24    This was sent in on 01/17/25, pharmacy is requesting a full month supply since PA was approved.

## 2025-01-31 DIAGNOSIS — M62.838 MUSCLE SPASM: ICD-10-CM

## 2025-01-31 DIAGNOSIS — E11.65 TYPE 2 DIABETES MELLITUS WITH HYPERGLYCEMIA, WITHOUT LONG-TERM CURRENT USE OF INSULIN: ICD-10-CM

## 2025-01-31 DIAGNOSIS — M54.2 ACUTE NECK PAIN: ICD-10-CM

## 2025-01-31 RX ORDER — CYCLOBENZAPRINE HCL 10 MG
10 TABLET ORAL 2 TIMES DAILY PRN
Qty: 60 TABLET | Refills: 1 | Status: SHIPPED | OUTPATIENT
Start: 2025-01-31

## 2025-01-31 RX ORDER — ORAL SEMAGLUTIDE 3 MG/1
3 TABLET ORAL
Qty: 30 TABLET | Refills: 1 | Status: SHIPPED | OUTPATIENT
Start: 2025-01-31

## 2025-01-31 NOTE — TELEPHONE ENCOUNTER
Rx Refill Note  Requested Prescriptions     Pending Prescriptions Disp Refills    cyclobenzaprine (FLEXERIL) 10 MG tablet 60 tablet 1     Sig: Take 1 tablet by mouth 2 (Two) Times a Day As Needed for Muscle Spasms.    Semaglutide (Rybelsus) 3 MG tablet 30 tablet 1     Sig: Take 1 tablet by mouth Every Morning Before Breakfast on empty stomach 30 min before food or other medication.      Last office visit with prescribing clinician: 1/17/2025   Last telemedicine visit with prescribing clinician: Visit date not found   Next office visit with prescribing clinician: 4/4/2025     Monika Blair MA  01/31/25, 12:10 EST

## 2025-01-31 NOTE — TELEPHONE ENCOUNTER
Caller: Eliz Velazquez    Relationship: Self    Best call back number: [phone]    Which medication are you concerned about: RYBELSUS AND FLEXERIL    Who prescribed you this medication: DR COURTNEY    What are your concerns: PHARMACY NEVER RECEIVED REFILL FOR RYBELSUS ON 01/17/2025 AND THEY ALSO NEVER RECEIVED REFILL ON FLEXERIL ON 01/03/2025. PLEASE RE SEND REFILLS TO [facility]  PHARMACY. PLEASE CALL PATIENT BACK WHEN THIS HAS BEEN SENT AGAIN.

## 2025-02-24 ENCOUNTER — PATIENT OUTREACH (OUTPATIENT)
Dept: CASE MANAGEMENT | Facility: OTHER | Age: 65
End: 2025-02-24
Payer: COMMERCIAL

## 2025-02-24 NOTE — OUTREACH NOTE
AMBULATORY CASE MANAGEMENT NOTE    Names and Relationships of Patient/Support Persons: Contact: Eliz Velazquez; Relationship: Self -         Education Documentation  sleep/rest, taught by Vanna Constantino RN at 2/24/2025  3:28 PM.  Learner: Patient  Readiness: Acceptance  Method: Explanation  Response: Verbalizes Understanding    Sick-Day Management, taught by Vanna Constantino RN at 2/24/2025  3:28 PM.  Learner: Patient  Readiness: Acceptance  Method: Explanation  Response: Verbalizes Understanding    A1C Goal, taught by Vanna Constantino RN at 2/24/2025  3:28 PM.  Learner: Patient  Readiness: Acceptance  Method: Explanation  Response: Verbalizes Understanding      Patient Outreach    Call to patient for f/u. States back pain is getting much better. She did returned to work with restricted duty but is tolerating current work well. She did miss the last couple of days but that is d/t flu. States her whole family has been sick and she finally got it. Reviewed sick day management as it pertained to diabetes management as well. Her last A1c was 8.3 up from 6.8 previously. She was started on Rybelsus and metformin to help get BS back under control. Pt states she has been taking for 2-3 months with no issues of side effects. Right now she is just dealing with the flu symptoms and reviewed medication precautions and when to seek emergent treatment. No other concerns at this time. Will continue to monitor    VANNA MEA  Ambulatory Case Management    2/24/2025, 15:28 EST

## 2025-02-24 NOTE — PLAN OF CARE
Problem: Nonspecific Low Back Pain  Goal: Stay Active and Independent  Outcome: Progressing  Intervention: My Activity and [place] To Do List  Description:   Why is this important?  Regular activity or exercise is important to managing back pain.  Activity helps to keep your muscles strong.  You will sleep better and feel more relaxed.  You will have more energy and feel less stressed.  If you are not active now, start slowly. Little changes make a big difference.  Rest, but not too much.  Stay as active as you can and listen to your body's signals.    Flowsheets (Taken 2/24/2025 1522)  My Activity and [place] To Do List:   get a stand-up desk for my workplace   walk indoors     Problem: Nonspecific Low Back Pain  Goal: [place] with Pain  Outcome: Met  Intervention: My Low Back Pain Coping To Do List  Description:   Why is this important?  Living with back pain and enjoying your life may be hard.  Feelings, such as depression or anger, can make your pain worse.  Learning ways to cope may help you find some relief from the pain.    Goal: Return to Work or School  Outcome: Met  Intervention: My Return to Work or School To Do List  Description:   Why is this important?  You might be worried about going back to work after a back injury.  You may wonder if your back will get worse if you do.  You might also think that you can't do what you used to do.  Going back to work is possible.  You, your doctor and employer will need to work together to figure how to make it OK for you.

## 2025-02-27 ENCOUNTER — OFFICE VISIT (OUTPATIENT)
Dept: NEUROSURGERY | Facility: CLINIC | Age: 65
End: 2025-02-27
Payer: COMMERCIAL

## 2025-02-27 VITALS
TEMPERATURE: 97.3 F | DIASTOLIC BLOOD PRESSURE: 110 MMHG | BODY MASS INDEX: 43.05 KG/M2 | HEIGHT: 61 IN | SYSTOLIC BLOOD PRESSURE: 180 MMHG | WEIGHT: 228 LBS

## 2025-02-27 DIAGNOSIS — S32.030A CLOSED COMPRESSION FRACTURE OF L3 VERTEBRA, INITIAL ENCOUNTER: ICD-10-CM

## 2025-02-27 DIAGNOSIS — S32.030D CLOSED COMPRESSION FRACTURE OF L3 LUMBAR VERTEBRA WITH ROUTINE HEALING, SUBSEQUENT ENCOUNTER: Primary | ICD-10-CM

## 2025-02-27 PROCEDURE — 99213 OFFICE O/P EST LOW 20 MIN: CPT | Performed by: PHYSICIAN ASSISTANT

## 2025-02-27 NOTE — PROGRESS NOTES
Patient: Eliz Velazquez  : 1960    Primary Care Provider: Sunitha Tobin MD    Chief Complaint: Low back pain    History of Present Illness:       Patient is a very nice 64-year-old female who works in [employer] as a .  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    Patient is doing well with the modifications of activity but still having issues with range of motion or bending forward.  I would encourage ongoing maintenance of her previously and stated restrictions.  Patient is actually looking to pursue the  job full-time.  She is working on that with her boss.    I be happy to help with that any way possible.    Review of Systems   Constitutional:  Negative for activity change, appetite change, chills, diaphoresis, fatigue, fever and unexpected weight change.   HENT:  Negative for congestion, dental problem, drooling, ear discharge, ear pain, facial swelling, hearing loss, mouth sores, nosebleeds, postnasal drip, rhinorrhea, sinus pressure, sinus pain, sneezing, sore throat, tinnitus, trouble swallowing and voice change.    Eyes:  Negative for photophobia, pain, discharge, redness, itching and visual disturbance.   Respiratory:  Negative for apnea, cough, choking, chest tightness, shortness of breath, wheezing and stridor.    Cardiovascular:  Negative for chest pain, palpitations and leg swelling.   Gastrointestinal:  Negative for abdominal distention, abdominal pain, anal bleeding, blood in stool, constipation, diarrhea, nausea, rectal pain and vomiting.   Endocrine: Negative for cold intolerance, heat intolerance, polydipsia, polyphagia and polyuria.   Genitourinary:  Negative for decreased urine volume, difficulty urinating, dyspareunia, dysuria, enuresis, flank pain, frequency, genital sores, hematuria, menstrual problem, pelvic pain, urgency, vaginal bleeding, vaginal discharge and vaginal pain.   Musculoskeletal:  Positive for back pain and gait problem. Negative  "for arthralgias, joint swelling, myalgias, neck pain and neck stiffness.   Skin:  Negative for color change, pallor, rash and wound.   Allergic/Immunologic: Negative for environmental allergies, food allergies and immunocompromised state.   Neurological:  Negative for dizziness, tremors, seizures, syncope, facial asymmetry, speech difficulty, weakness, light-headedness, numbness and headaches.   Hematological:  Negative for adenopathy. Does not bruise/bleed easily.   Psychiatric/Behavioral:  Negative for agitation, behavioral problems, confusion, decreased concentration, dysphoric mood, hallucinations, self-injury, sleep disturbance and suicidal ideas. The patient is not nervous/anxious and is not hyperactive.        Past Medical History:     Past Medical History:   Diagnosis Date    Diabetes mellitus     Fatty liver     Hypertension     Iron deficiency anemia secondary to blood loss (chronic)     heavy menses    Pure hypercholesterolemia     Seasonal allergies        Family History:     Family History   Problem Relation Age of Onset    Hyperlipidemia Mother     Hypertension Mother     Diabetes Mother     Obesity Mother     No Known Problems Father     Breast cancer Neg Hx     Ovarian cancer Neg Hx        Social History:    reports that she has never smoked. She has never been exposed to tobacco smoke. She has never used smokeless tobacco. She reports that she does not drink alcohol and does not use drugs.   SMOKING STATUS: Non-smoker    Surgical History:     Past Surgical History:   Procedure Laterality Date    TONSILLECTOMY      TOTAL ABDOMINAL HYSTERECTOMY WITH SALPINGO OOPHORECTOMY      TUBAL ABDOMINAL LIGATION         Allergies:   Lisinopril    Physical Exam:    Vital Signs:BP (!) 180/110 (BP Location: Left arm, Patient Position: Sitting, Cuff Size: Adult)   Temp 97.3 °F (36.3 °C) (Infrared)   Ht 154.9 cm (61\")   Wt 103 kg (228 lb)   BMI 43.08 kg/m²    BMI: Body mass index is 43.08 " kg/m².       Musculoskeletal:      Range of motion good in her lumbar spine.  Patient was able to show me her stretches she is able to do                                          Dorsiflexion is 5/5 Bilaterally                    Plantarflexion is 5/5 bilaterally                    Hip Flexion 5/5 bilaterally.                        Neurologic:                                         The patient is alert and oriented by 3.                       Sensation is equal bilaterally with no deficit.                        Reflexes:  2+ throughout       Medical Decision Making    Data Review:   No new films reviewed at this visit    Diagnosis:   L3 compression fracture    Treatment Options:   From a neurosurgical perspective patient is looking good and does not require any follow-up.  I be happy to help her out with limitations from her job duties.    She is back to work but working as a  and seems to be doing well with that.  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    If she ever wants to get back into that we will have a discussion and that I will re write her restrictions        Diagnosis Plan   1. Closed compression fracture of L3 lumbar vertebra with routine healing, subsequent encounter        2. Closed compression fracture of L3 vertebra, initial encounter

## 2025-03-06 ENCOUNTER — TELEPHONE (OUTPATIENT)
Dept: NEUROSURGERY | Facility: CLINIC | Age: 65
End: 2025-03-06
Payer: COMMERCIAL

## 2025-03-06 NOTE — TELEPHONE ENCOUNTER
Caller: MICHELLE    Relationship: SELF    Best call back number: [phone]      What form or medical record are you requesting: UPDATE LETTER STATING WHEN THE PATIENT CAN GO BACK TO WORK W/O RESTRICTIONS & IT ALSO NEEDS TO STATE WHAT RESTRICTIONS SHE IS CURRENTLY ON & FOR HOW LONG IT IS ANTICIPATED SHE WILL BE ON THE RESTRICTIONS    Who is requesting this form or medical record from you: EMPLOYER    How would you like to receive the form or medical records (pick-up, mail, fax): FAX    If fax, what is the fax number :[phone]    Timeframe paperwork needed: ASAP

## 2025-03-07 NOTE — TELEPHONE ENCOUNTER
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

## 2025-03-12 DIAGNOSIS — E11.65 TYPE 2 DIABETES MELLITUS WITH HYPERGLYCEMIA, WITHOUT LONG-TERM CURRENT USE OF INSULIN: ICD-10-CM

## 2025-03-12 DIAGNOSIS — I10 PRIMARY HYPERTENSION: ICD-10-CM

## 2025-03-12 RX ORDER — LOSARTAN POTASSIUM 100 MG/1
100 TABLET ORAL DAILY
Qty: 90 TABLET | Refills: 1 | Status: SHIPPED | OUTPATIENT
Start: 2025-03-12

## 2025-03-12 NOTE — TELEPHONE ENCOUNTER
Rx Refill Note  Requested Prescriptions     Pending Prescriptions Disp Refills    losartan (COZAAR) 100 MG tablet 90 tablet 1     Sig: Take 1 tablet by mouth Daily.      Last office visit with prescribing clinician: 1/17/2025   Last telemedicine visit with prescribing clinician: Visit date not found   Next office visit with prescribing clinician: 4/4/2025     Ariane Villalpando MA  03/12/25, 12:52 EDT    Rx sent

## 2025-03-19 ENCOUNTER — TELEPHONE (OUTPATIENT)
Dept: NEUROSURGERY | Facility: CLINIC | Age: 65
End: 2025-03-19
Payer: COMMERCIAL

## 2025-03-19 NOTE — TELEPHONE ENCOUNTER
Provider: MEGAN BHATTI    Caller: MICHELLE CAMMY      Phone Number: [phone]    Reason for Call: HER EMPLOYER WANTS TO KNOW WHEN THE RESTRICTIONS WILL BE LIFTED-THEY NEED AN END DATE FOR EVERYTHING.    2ND WEEK OF APRIL IS WHEN THE NEW SCHEDULE COMES OUT.    When was the patient last seen: 2-27-25

## 2025-04-04 ENCOUNTER — OFFICE VISIT (OUTPATIENT)
Age: 65
End: 2025-04-04
Payer: COMMERCIAL

## 2025-04-04 VITALS
HEIGHT: 61 IN | SYSTOLIC BLOOD PRESSURE: 138 MMHG | HEART RATE: 78 BPM | OXYGEN SATURATION: 96 % | BODY MASS INDEX: 42.91 KG/M2 | DIASTOLIC BLOOD PRESSURE: 80 MMHG | WEIGHT: 227.25 LBS

## 2025-04-04 DIAGNOSIS — E11.65 TYPE 2 DIABETES MELLITUS WITH HYPERGLYCEMIA, WITHOUT LONG-TERM CURRENT USE OF INSULIN: Primary | ICD-10-CM

## 2025-04-04 DIAGNOSIS — E66.01 CLASS 3 SEVERE OBESITY DUE TO EXCESS CALORIES WITH SERIOUS COMORBIDITY AND BODY MASS INDEX (BMI) OF 40.0 TO 44.9 IN ADULT: ICD-10-CM

## 2025-04-04 DIAGNOSIS — E66.813 CLASS 3 SEVERE OBESITY DUE TO EXCESS CALORIES WITH SERIOUS COMORBIDITY AND BODY MASS INDEX (BMI) OF 40.0 TO 44.9 IN ADULT: ICD-10-CM

## 2025-04-04 LAB
EXPIRATION DATE: ABNORMAL
HBA1C MFR BLD: 6.9 % (ref 4.5–5.7)
Lab: ABNORMAL

## 2025-04-04 PROCEDURE — 82570 ASSAY OF URINE CREATININE: CPT | Performed by: FAMILY MEDICINE

## 2025-04-04 PROCEDURE — 84156 ASSAY OF PROTEIN URINE: CPT | Performed by: FAMILY MEDICINE

## 2025-04-04 RX ORDER — ORAL SEMAGLUTIDE 3 MG/1
3 TABLET ORAL
Qty: 90 TABLET | Refills: 1 | Status: SHIPPED | OUTPATIENT
Start: 2025-04-04

## 2025-04-04 NOTE — PROGRESS NOTES
"Chief Complaint  Diabetes    Subjective        Eliz Velazquez presents to [facility]  History of Present Illness    History of Present Illness  The patient is a 64-year-old female presenting for a follow-up on diabetes.    She has been adhering to her prescribed regimen of metformin twice daily, Rybelsus, and Jardiance. However, she reports experiencing xerostomia as a side effect. She does not experience any nausea, abdominal pain, or diarrhea. She does not monitor her blood glucose levels at home. Her dietary habits include consuming two meals per day, breakfast and lunch, with a focus on protein intake. She typically consumes two hard-boiled egg whites for breakfast and a salad with meat, garbanzo beans, red bell peppers, and blueberries for lunch. Her dinner usually consists of small portions of grilled chicken or shrimp.       Objective   Vital Signs:  /80 (BP Location: Left arm, Patient Position: Sitting, Cuff Size: Adult)   Pulse 78   Ht 154.9 cm (61\")   Wt 103 kg (227 lb 4 oz)   SpO2 96%   BMI 42.94 kg/m²   Estimated body mass index is 42.94 kg/m² as calculated from the following:    Height as of this encounter: 154.9 cm (61\").    Weight as of this encounter: 103 kg (227 lb 4 oz).            The following portions of the patient's history were reviewed and updated as appropriate: allergies, current medications, past family history, past medical history, past social history, past surgical history, and problem list.       Physical Exam  Vitals reviewed.   Constitutional:       General: She is not in acute distress.     Appearance: She is morbidly obese. She is not ill-appearing.   Cardiovascular:      Rate and Rhythm: Normal rate and regular rhythm.   Pulmonary:      Effort: Pulmonary effort is normal.      Breath sounds: Normal breath sounds.   Neurological:      Mental Status: She is alert.   Psychiatric:         Mood and Affect: Mood normal.        Result Review " :  The following data was reviewed by: Sunitha Tobin MD on 04/04/2025:  Common labs          10/30/2024    10:22 1/3/2025    10:24 4/4/2025    09:17   Common Labs   Glucose 148      BUN 13      Creatinine 0.57      Sodium 141      Potassium 4.6      Chloride 104      Calcium 9.2      Albumin 4.2      Total Bilirubin 0.4      Alkaline Phosphatase 121      AST (SGOT) 32      ALT (SGPT) 33      WBC 6.00      Hemoglobin 14.6      Hematocrit 44.1      Platelets 221      Hemoglobin A1C  8.3  6.9      A1C Last 3 Results          10/3/2024    08:47 1/3/2025    10:24 4/4/2025    09:17   HGBA1C Last 3 Results   Hemoglobin A1C 6.8  8.3  6.9                Assessment and Plan   Diagnoses and all orders for this visit:    1. Type 2 diabetes mellitus with hyperglycemia, without long-term current use of insulin (Primary)  -     POC Glycosylated Hemoglobin (Hb A1C)  -     Protein / Creatinine Ratio, Urine - Urine, Clean Catch; Future  -     Semaglutide (Rybelsus) 3 MG tablet; Take 1 tablet by mouth Every Morning Before Breakfast on empty stomach 30 minutes before food or other medication.  Dispense: 90 tablet; Refill: 1  -     Protein / Creatinine Ratio, Urine - Urine, Clean Catch    2. Class 3 severe obesity due to excess calories with serious comorbidity and body mass index (BMI) of 40.0 to 44.9 in adult             Assessment & Plan  1. Diabetes Mellitus.  Her A1c level has improved from 8.3 in January to 6.9 today.  Goal A1c less than 7 based on her age and comorbidities.  She reports experiencing a very dry mouth as a side effect but has not experienced nausea, abdominal pain, or diarrhea. She does not monitor her blood sugar at home due to needle phobia. She is advised to maintain a regular eating schedule, including dinner, to prevent malnutrition and ensure adequate protein intake. The current diabetes medication regimen will be continued.  Recheck in 3 months.    2.  Weight management.  Class 3 Severe Obesity (BMI  >=40). Obesity-related health conditions include the following: hypertension, diabetes mellitus, dyslipidemias, and osteoarthritis. Obesity is unchanged. BMI is is above average; BMI management plan is completed. We discussed low calorie, low carb based diet program, portion control, and focus on protein intake, do not skip meals.      Follow-up  The patient will follow up on 07/07/2025.      Follow Up   Return in about 3 months (around 7/7/2025) for Physical, as scheduled.  Patient was given instructions and counseling regarding her condition or for health maintenance advice. Please see specific information pulled into the AVS if appropriate.         Patient or patient representative verbalized consent for the use of Ambient Listening during the visit with  Sunitha Tobin MD for chart documentation. 4/4/2025  10:02 EDT    Electronically signed by Sunitha Tobin MD, 04/04/25, 10:02 AM EDT.

## 2025-04-05 LAB
CREAT UR-MCNC: 41 MG/DL
PROT ?TM UR-MCNC: 4.3 MG/DL
PROT/CREAT UR: 104.9 MG/G CREA (ref 0–200)

## 2025-04-07 ENCOUNTER — HOSPITAL ENCOUNTER (EMERGENCY)
Facility: HOSPITAL | Age: 65
Discharge: HOME | End: 2025-04-07
Attending: EMERGENCY MEDICINE | Admitting: EMERGENCY MEDICINE
Payer: COMMERCIAL

## 2025-04-07 ENCOUNTER — APPOINTMENT (OUTPATIENT)
Dept: CT IMAGING | Facility: HOSPITAL | Age: 65
End: 2025-04-07
Payer: COMMERCIAL

## 2025-04-07 ENCOUNTER — APPOINTMENT (OUTPATIENT)
Dept: GENERAL RADIOLOGY | Facility: HOSPITAL | Age: 65
End: 2025-04-07
Payer: COMMERCIAL

## 2025-04-07 ENCOUNTER — RESULTS FOLLOW-UP (OUTPATIENT)
Age: 65
End: 2025-04-07
Payer: COMMERCIAL

## 2025-04-07 ENCOUNTER — APPOINTMENT (OUTPATIENT)
Dept: ULTRASOUND IMAGING | Facility: HOSPITAL | Age: 65
End: 2025-04-07
Payer: COMMERCIAL

## 2025-04-07 VITALS
TEMPERATURE: 99.1 F | OXYGEN SATURATION: 93 % | SYSTOLIC BLOOD PRESSURE: 178 MMHG | RESPIRATION RATE: 20 BRPM | DIASTOLIC BLOOD PRESSURE: 93 MMHG | HEART RATE: 101 BPM

## 2025-04-07 DIAGNOSIS — R11.2 NAUSEA AND VOMITING, UNSPECIFIED VOMITING TYPE: Primary | ICD-10-CM

## 2025-04-07 PROBLEM — E11.29 DIABETES MELLITUS WITH PROTEINURIA: Status: ACTIVE | Noted: 2025-04-07

## 2025-04-07 PROBLEM — R80.9 DIABETES MELLITUS WITH PROTEINURIA: Status: ACTIVE | Noted: 2025-04-07

## 2025-04-07 LAB
ALBUMIN SERPL-MCNC: 4.8 G/DL (ref 3.5–5.2)
ALBUMIN/GLOB SERPL: 1.3 G/DL
ALP SERPL-CCNC: 162 U/L (ref 39–117)
ALT SERPL W P-5'-P-CCNC: 65 U/L (ref 1–33)
ANION GAP SERPL CALCULATED.3IONS-SCNC: 14 MMOL/L (ref 5–15)
AST SERPL-CCNC: 44 U/L (ref 1–32)
BASOPHILS # BLD AUTO: 0.01 10*3/MM3 (ref 0–0.2)
BASOPHILS NFR BLD AUTO: 0.1 % (ref 0–1.5)
BILIRUB SERPL-MCNC: 0.6 MG/DL (ref 0–1.2)
BUN SERPL-MCNC: 18 MG/DL (ref 8–23)
BUN/CREAT SERPL: 23.7 (ref 7–25)
CALCIUM SPEC-SCNC: 9.7 MG/DL (ref 8.6–10.5)
CHLORIDE SERPL-SCNC: 102 MMOL/L (ref 98–107)
CO2 SERPL-SCNC: 24 MMOL/L (ref 22–29)
CREAT SERPL-MCNC: 0.76 MG/DL (ref 0.57–1)
D-LACTATE SERPL-SCNC: 1.7 MMOL/L (ref 0.5–2)
D-LACTATE SERPL-SCNC: 2.2 MMOL/L (ref 0.5–2)
DEPRECATED RDW RBC AUTO: 43.1 FL (ref 37–54)
EGFRCR SERPLBLD CKD-EPI 2021: 87.1 ML/MIN/1.73
EOSINOPHIL # BLD AUTO: 0.06 10*3/MM3 (ref 0–0.4)
EOSINOPHIL NFR BLD AUTO: 0.6 % (ref 0.3–6.2)
ERYTHROCYTE [DISTWIDTH] IN BLOOD BY AUTOMATED COUNT: 12.8 % (ref 12.3–15.4)
GLOBULIN UR ELPH-MCNC: 3.6 GM/DL
GLUCOSE SERPL-MCNC: 216 MG/DL (ref 65–99)
HCT VFR BLD AUTO: 50.9 % (ref 34–46.6)
HGB BLD-MCNC: 16.1 G/DL (ref 12–15.9)
HOLD SPECIMEN: NORMAL
IMM GRANULOCYTES # BLD AUTO: 0.05 10*3/MM3 (ref 0–0.05)
IMM GRANULOCYTES NFR BLD AUTO: 0.5 % (ref 0–0.5)
LIPASE SERPL-CCNC: 36 U/L (ref 13–60)
LYMPHOCYTES # BLD AUTO: 0.31 10*3/MM3 (ref 0.7–3.1)
LYMPHOCYTES NFR BLD AUTO: 3.1 % (ref 19.6–45.3)
MCH RBC QN AUTO: 29.2 PG (ref 26.6–33)
MCHC RBC AUTO-ENTMCNC: 31.6 G/DL (ref 31.5–35.7)
MCV RBC AUTO: 92.2 FL (ref 79–97)
MONOCYTES # BLD AUTO: 0.39 10*3/MM3 (ref 0.1–0.9)
MONOCYTES NFR BLD AUTO: 3.9 % (ref 5–12)
NEUTROPHILS NFR BLD AUTO: 9.07 10*3/MM3 (ref 1.7–7)
NEUTROPHILS NFR BLD AUTO: 91.8 % (ref 42.7–76)
NRBC BLD AUTO-RTO: 0 /100 WBC (ref 0–0.2)
PLATELET # BLD AUTO: 238 10*3/MM3 (ref 140–450)
PMV BLD AUTO: 11.2 FL (ref 6–12)
POTASSIUM SERPL-SCNC: 4.5 MMOL/L (ref 3.5–5.2)
PROT SERPL-MCNC: 8.4 G/DL (ref 6–8.5)
RBC # BLD AUTO: 5.52 10*6/MM3 (ref 3.77–5.28)
SODIUM SERPL-SCNC: 140 MMOL/L (ref 136–145)
WBC NRBC COR # BLD AUTO: 9.89 10*3/MM3 (ref 3.4–10.8)
WHOLE BLOOD HOLD COAG: NORMAL
WHOLE BLOOD HOLD SPECIMEN: NORMAL

## 2025-04-07 PROCEDURE — 83690 ASSAY OF LIPASE: CPT | Performed by: EMERGENCY MEDICINE

## 2025-04-07 PROCEDURE — 71045 X-RAY EXAM CHEST 1 VIEW: CPT

## 2025-04-07 PROCEDURE — 76705 ECHO EXAM OF ABDOMEN: CPT

## 2025-04-07 PROCEDURE — 71260 CT THORAX DX C+: CPT

## 2025-04-07 PROCEDURE — 96361 HYDRATE IV INFUSION ADD-ON: CPT

## 2025-04-07 PROCEDURE — 25810000003 SODIUM CHLORIDE 0.9 % SOLUTION: Performed by: PHYSICIAN ASSISTANT

## 2025-04-07 PROCEDURE — 85025 COMPLETE CBC W/AUTO DIFF WBC: CPT | Performed by: EMERGENCY MEDICINE

## 2025-04-07 PROCEDURE — 83605 ASSAY OF LACTIC ACID: CPT | Performed by: EMERGENCY MEDICINE

## 2025-04-07 PROCEDURE — 25010000002 MORPHINE PER 10 MG: Performed by: EMERGENCY MEDICINE

## 2025-04-07 PROCEDURE — 74177 CT ABD & PELVIS W/CONTRAST: CPT

## 2025-04-07 PROCEDURE — 25010000002 ONDANSETRON PER 1 MG: Performed by: PHYSICIAN ASSISTANT

## 2025-04-07 PROCEDURE — 36415 COLL VENOUS BLD VENIPUNCTURE: CPT

## 2025-04-07 PROCEDURE — 80053 COMPREHEN METABOLIC PANEL: CPT | Performed by: EMERGENCY MEDICINE

## 2025-04-07 PROCEDURE — 96375 TX/PRO/DX INJ NEW DRUG ADDON: CPT

## 2025-04-07 PROCEDURE — 99285 EMERGENCY DEPT VISIT HI MDM: CPT

## 2025-04-07 PROCEDURE — 96374 THER/PROPH/DIAG INJ IV PUSH: CPT

## 2025-04-07 PROCEDURE — 25510000001 IOPAMIDOL 61 % SOLUTION: Performed by: EMERGENCY MEDICINE

## 2025-04-07 RX ORDER — MORPHINE SULFATE 4 MG/ML
4 INJECTION, SOLUTION INTRAMUSCULAR; INTRAVENOUS ONCE
Status: COMPLETED | OUTPATIENT
Start: 2025-04-07 | End: 2025-04-07

## 2025-04-07 RX ORDER — ONDANSETRON 2 MG/ML
4 INJECTION INTRAMUSCULAR; INTRAVENOUS ONCE
Status: COMPLETED | OUTPATIENT
Start: 2025-04-07 | End: 2025-04-07

## 2025-04-07 RX ORDER — ONDANSETRON 4 MG/1
4 TABLET, FILM COATED ORAL EVERY 6 HOURS PRN
Qty: 15 TABLET | Refills: 0 | Status: SHIPPED | OUTPATIENT
Start: 2025-04-07

## 2025-04-07 RX ORDER — ONDANSETRON 4 MG/1
4 TABLET, FILM COATED ORAL EVERY 6 HOURS PRN
Qty: 15 TABLET | Refills: 0 | Status: SHIPPED | OUTPATIENT
Start: 2025-04-07 | End: 2025-04-07

## 2025-04-07 RX ORDER — SODIUM CHLORIDE 9 MG/ML
10 INJECTION, SOLUTION INTRAMUSCULAR; INTRAVENOUS; SUBCUTANEOUS AS NEEDED
Status: DISCONTINUED | OUTPATIENT
Start: 2025-04-07 | End: 2025-04-07 | Stop reason: HOSPADM

## 2025-04-07 RX ORDER — IOPAMIDOL 612 MG/ML
100 INJECTION, SOLUTION INTRAVASCULAR
Status: COMPLETED | OUTPATIENT
Start: 2025-04-07 | End: 2025-04-07

## 2025-04-07 RX ADMIN — IOPAMIDOL 85 ML: 612 INJECTION, SOLUTION INTRAVENOUS at 11:26

## 2025-04-07 RX ADMIN — SODIUM CHLORIDE 1000 ML: 900 INJECTION, SOLUTION INTRAVENOUS at 12:50

## 2025-04-07 RX ADMIN — MORPHINE SULFATE 4 MG: 4 INJECTION, SOLUTION INTRAMUSCULAR; INTRAVENOUS at 13:10

## 2025-04-07 RX ADMIN — ONDANSETRON 4 MG: 2 INJECTION INTRAMUSCULAR; INTRAVENOUS at 12:49

## 2025-04-07 NOTE — Clinical Note
[facility] EMERGENCY DEPARTMENT  [address]  [facility] [address]  Phone: [phone]    Eliz Velazquez was seen and treated in our emergency department on 4/7/2025.  She may return to work on 04/09/2025.         Thank you for choosing [facility].    Neville Roche Jr., DIONNA

## 2025-04-07 NOTE — ED PROVIDER NOTES
11/15/2022--72 y.o female scheduled for Removal of Mass Left Mastectomy Flap  VS: BP-159/78 P- 84  T- 97  SpO2- 100%  Plan  1. Stop all NSAIDS, herbal supplements and vitamins for 7 days. ASA & Clopidogrel per cardiology directions. Direction on Lantus /Insulin from Dr Hubbard, endocrinology   2. NPO at midnight.  3. Take the following medications Amlodipine, Hydralazine, Levothyroxine, Isosorbide, Gabapentin with small sips of water on the morning of your procedure/surgery.  4. Use EZ sponges as directed  5. Labs, EKG as per surgeon  6. PMD LULI Serrano, Dr Ibrahim cardiology, & Dr Hubbard endocrinology visit for optimization prior to surgery as per surgeon  7. COVID swab to be done 11/18/2022 Subjective  History of Present Illness:    Chief Complaint: Nausea, vomiting, and diarrhea  History of Present Illness: 64-year-old female presents with a complaint of nausea, vomiting, and diarrhea that started at 2 AM this morning.  While on the way to the ED, she began to feel extremely dizzy and now is keeping her eyes closed because of the dizziness.  She is also having some associated abdominal pain that began after symptom onset.  She denies fever or chills.  She has tried Tums and ibuprofen to help relieve her symptoms but no relief.  Patient reports that her son was having similar symptoms last week in which he went to urgent care and was told that it was a viral stomach illness.   Onset: Acute onset  Duration: Symptoms began at 2 AM this morning  Exacerbating / Alleviating factors: No alleviating factors  Associated symptoms: Dizziness      Nurses Notes reviewed and agree, including vitals, allergies, social history and prior medical history.     REVIEW OF SYSTEMS: All systems reviewed and not pertinent unless noted.    Review of Systems   Constitutional:  Positive for appetite change.   Gastrointestinal:  Positive for abdominal pain, diarrhea, nausea and vomiting.   Neurological:  Positive for dizziness.   All other systems reviewed and are negative.      Past Medical History:   Diagnosis Date    Diabetes mellitus     Fatty liver     Hypertension     Iron deficiency anemia secondary to blood loss (chronic)     heavy menses    Pure hypercholesterolemia     Seasonal allergies        Allergies:    Lisinopril      Past Surgical History:   Procedure Laterality Date    TONSILLECTOMY      TOTAL ABDOMINAL HYSTERECTOMY WITH SALPINGO OOPHORECTOMY      TUBAL ABDOMINAL LIGATION           Social History     Socioeconomic History    Marital status:    Tobacco Use    Smoking status: Never     Passive exposure: Never    Smokeless tobacco: Never   Vaping Use    Vaping status: Never Used   Substance and Sexual  Activity    Alcohol use: No    Drug use: No    Sexual activity: Defer         Family History   Problem Relation Age of Onset    Hyperlipidemia Mother     Hypertension Mother     Diabetes Mother     Obesity Mother     No Known Problems Father     Breast cancer Neg Hx     Ovarian cancer Neg Hx        Objective  Physical Exam:  /93   Pulse 101   Temp 99.1 °F (37.3 °C) (Oral)   Resp 20   SpO2 93%      Physical Exam  Vitals and nursing note reviewed.   Constitutional:       Appearance: She is ill-appearing.   Abdominal:      Tenderness: There is abdominal tenderness.      Comments: Mid epigastric tender to palpation            Procedures    ED Course:    ED Course as of 04/09/25 1003   Mon Apr 07, 2025   1105 Review of previous  non ED visits, prior labs, prior imaging, available notes from prior evaluations or visits with specialists, medication list, allergies, past medical history, past surgical history     [CS]   1105 I Personally reviewed all laboratory studies performed in the emergency department     Lactic 2.2, evaluate the patient, midepigastric pain, concerned about possible gallbladder disease or pancreatic with a low oxygen saturation, and midepigastric pain, will scan her chest to rule out a AAA [CS]   1455 Updated the patient on all results, on reevaluation her symptoms had improved   [CS]      ED Course User Index  [CS] Neville Roche Jr., DIONNA       Lab Results (last 24 hours)       Procedure Component Value Units Date/Time    POC Rapid Strep A [[id number]]  (Normal) Collected: 04/08/25 1738    Specimen: Swab Updated: 04/08/25 1738     Rapid Strep A Screen Negative     Internal Control Passed     Lot Number [id number]     Expiration Date 3-    Covid-19 + Flu A&B AG, Veritor [[id number]]  (Normal) Collected: 04/08/25 1807    Specimen: Swab Updated: 04/08/25 1807     COVID19 Not Detected     Influenza A Antigen LIAM Not Detected     Influenza B Antigen LIAM Not Detected     Internal Control  Passed     Lot Number [id number]     Expiration Date 31,327    CBC & Differential [[id number]]  (Abnormal) Collected: 04/08/25 1923    Specimen: Blood Updated: 04/08/25 2010    Narrative:      The following orders were created for panel order CBC & Differential.  Procedure                               Abnormality         Status                     ---------                               -----------         ------                     CBC Auto Differential[[id number]]        Abnormal            Final result               Scan Slide[[id number]]                                       Final result                 Please view results for these tests on the individual orders.    Lactic Acid, Plasma [[id number]]  (Normal) Collected: 04/08/25 1923    Specimen: Blood Updated: 04/08/25 2009     Lactate 1.5 mmol/L      Comment: Falsely depressed results may occur on samples drawn from patients receiving N-Acetylcysteine (NAC) or Metamizole.       CBC Auto Differential [[id number]]  (Abnormal) Collected: 04/08/25 1923    Specimen: Blood Updated: 04/08/25 2010     WBC 6.61 10*3/mm3      RBC 5.58 10*6/mm3      Hemoglobin 16.3 g/dL      Hematocrit 50.6 %      MCV 90.7 fL      MCH 29.2 pg      MCHC 32.2 g/dL      RDW 13.2 %      RDW-SD 44.3 fl      MPV 11.6 fL      Platelets 173 10*3/mm3      Neutrophil % 76.6 %      Lymphocyte % 15.0 %      Monocyte % 7.6 %      Eosinophil % 0.3 %      Basophil % 0.2 %      Immature Grans % 0.3 %      Neutrophils, Absolute 5.07 10*3/mm3      Lymphocytes, Absolute 0.99 10*3/mm3      Monocytes, Absolute 0.50 10*3/mm3      Eosinophils, Absolute 0.02 10*3/mm3      Basophils, Absolute 0.01 10*3/mm3      Immature Grans, Absolute 0.02 10*3/mm3      nRBC 0.0 /100 WBC     Scan Slide [[id number]] Collected: 04/08/25 1923    Specimen: Blood Updated: 04/08/25 2010     Yvette Cells Slight/1+     Polychromasia Slight/1+     WBC Morphology Normal     Platelet Estimate Adequate    Urinalysis With Microscopic If Indicated  (No Culture) - Urine, Clean Catch [[id number]]  (Abnormal) Collected: 04/08/25 1925    Specimen: Urine, Clean Catch Updated: 04/08/25 1950     Color, UA Dark Yellow     Appearance, UA Turbid     pH, UA <=5.0     Specific Gravity, UA 1.039     Glucose, UA >=1000 mg/dL (3+)     Ketones, UA 40 mg/dL (2+)     Bilirubin, UA Small (1+)     Blood, UA Trace     Protein,  mg/dL (2+)     Leuk Esterase, UA Small (1+)     Nitrite, UA Negative     Urobilinogen, UA 1.0 E.U./dL    Urinalysis, Microscopic Only - Urine, Clean Catch [[id number]]  (Abnormal) Collected: 04/08/25 1925    Specimen: Urine, Clean Catch Updated: 04/08/25 2011     RBC, UA None Seen /HPF      WBC, UA Too Numerous to Count /HPF      Bacteria, UA 4+ /HPF      Squamous Epithelial Cells, UA 21-30 /HPF      Hyaline Casts, UA None Seen /LPF      Mucus, UA Moderate/2+ /HPF      Methodology Manual Light Microscopy    Urine Culture - Urine, Urine, Clean Catch [[id number]] Collected: 04/08/25 1925    Specimen: Urine, Clean Catch Updated: 04/08/25 2136    Blood Culture - Blood, Arm, Left [[id number]] Collected: 04/08/25 2100    Specimen: Blood from Arm, Left Updated: 04/08/25 2139    Blood Culture - Blood, Arm, Right [[id number]] Collected: 04/08/25 2110    Specimen: Blood from Arm, Right Updated: 04/08/25 2138    Comprehensive Metabolic Panel [[id number]]  (Abnormal) Collected: 04/08/25 2114    Specimen: Blood Updated: 04/08/25 2153     Glucose 141 mg/dL      BUN 13 mg/dL      Creatinine 0.60 mg/dL      Sodium 135 mmol/L      Potassium 3.7 mmol/L      Comment: Slight hemolysis detected by analyzer. Result may be falsely elevated.        Chloride 101 mmol/L      CO2 22.0 mmol/L      Calcium 8.1 mg/dL      Total Protein 6.5 g/dL      Albumin 3.5 g/dL      ALT (SGPT) 56 U/L      AST (SGOT) 37 U/L      Alkaline Phosphatase 94 U/L      Total Bilirubin 0.6 mg/dL      Globulin 3.0 gm/dL      Comment: Calculated Result        A/G Ratio 1.2 g/dL      BUN/Creatinine Ratio 21.7  "    Anion Gap 12.0 mmol/L      eGFR 99.8 mL/min/1.73     Narrative:      GFR Categories in Chronic Kidney Disease (CKD)      GFR Category          GFR (mL/min/1.73)    Interpretation  G1                     90 or greater         Normal or high (1)  G2                      60-89                Mild decrease (1)  G3a                   45-59                Mild to moderate decrease  G3b                   30-44                Moderate to severe decrease  G4                    15-29                Severe decrease  G5                    14 or less           Kidney failure          (1)In the absence of evidence of kidney disease, neither GFR category G1 or G2 fulfill the criteria for CKD.    eGFR calculation 2021 CKD-EPI creatinine equation, which does not include race as a factor    Lipase [[id number]]  (Normal) Collected: 04/08/25 2114    Specimen: Blood Updated: 04/08/25 2153     Lipase 18 U/L     CK [[id number]]  (Normal) Collected: 04/08/25 2114    Specimen: Blood Updated: 04/08/25 2153     Creatine Kinase 92 U/L     TSH Rfx On Abnormal To Free T4 [[id number]]  (Normal) Collected: 04/08/25 2114    Specimen: Blood Updated: 04/08/25 2200     TSH 1.670 uIU/mL     Procalcitonin [[id number]]  (Normal) Collected: 04/08/25 2114    Specimen: Blood Updated: 04/08/25 2200     Procalcitonin 0.17 ng/mL     Narrative:      As a Marker for Sepsis (Non-Neonates):    1. <0.5 ng/mL represents a low risk of severe sepsis and/or septic shock.  2. >2 ng/mL represents a high risk of severe sepsis and/or septic shock.    As a Marker for Lower Respiratory Tract Infections that require antibiotic therapy:    PCT on Admission    Antibiotic Therapy       6-12 Hrs later    >0.5                Strongly Recommended  >0.25 - <0.5        Recommended   0.1 - 0.25          Discouraged              Remeasure/reassess PCT  <0.1                Strongly Discouraged     Remeasure/reassess PCT    As 28 day mortality risk marker: \"Change in Procalcitonin " "Result\" (>80% or <=80%) if Day 0 (or Day 1) and Day 4 values are available. Refer to http://[email or web address]-pct-[email or web address]    Change in PCT <=80%  A decrease of PCT levels below or equal to 80% defines a positive change in PCT test result representing a higher risk for 28-day all-cause mortality of patients diagnosed with severe sepsis for septic shock.    Change in PCT >80%  A decrease of PCT levels of more than 80% defines a negative change in PCT result representing a lower risk for 28-day all-cause mortality of patients diagnosed with severe sepsis or septic shock.       Magnesium [[id number]]  (Normal) Collected: 04/08/25 2114    Specimen: Blood Updated: 04/08/25 2255     Magnesium 1.8 mg/dL              XR Chest 1 View  Result Date: 4/8/2025  XR CHEST 1 VW Date of Exam: 4/8/2025 6:43 PM EDT Indication: weak Comparison: CT chest 4/7/2025 Findings: The cardiomediastinal silhouette is within normal limits. Lungs are clear. No focal consolidation, pneumothorax, or significant pleural effusion. Osseous structures grossly intact.     Impression: Impression: No acute process. Electronically Signed: Stewart Nielson MD  4/8/2025 7:07 PM EDT  Workstation ID: [id number]    US Gallbladder  Result Date: 4/7/2025  US GALLBLADDER Date of Exam: 4/7/2025 11:33 AM EDT Indication: epigastric pain. Comparison: CT abdomen pelvis from earlier today Technique: Grayscale and color Doppler ultrasound evaluation of the right upper quadrant was performed. Findings: The pancreas appears normal. The liver has a normal contour but diffuse increased echogenicity compatible with steatosis. No focal hepatic mass is identified. The hepatic vasculature appears within normal limits. There are several stones in the gallbladder, but no secondary findings of acute cholecystitis. There was a negative sonographic Ureña sign. The common bile duct is normal in caliber at 5.2 mm. The right kidney measures 10.3 cm and appears normal.     Impression: " Impression: 1.Cholelithiasis, but no secondary findings of acute cholecystitis. 2.Hepatic steatosis. Electronically Signed: Onel Herrmann MD  4/7/2025 12:23 PM EDT  Workstation ID: [id number]    CT Abdomen Pelvis With Contrast  Addendum Date: 4/7/2025  ADDENDUM #1 Fat-containing umbilical hernia is also present. Electronically Signed: Elian Lewis MD  4/7/2025 11:43 AM EDT  Workstation ID: [id number] ORIGINAL REPORT: CT ABDOMEN PELVIS W CONTRAST Date of Exam: 4/7/2025 11:15 AM EDT Indication: mid   epigastric pain. Comparison: CT of the abdomen and pelvis dated 10/30/2024 Technique: Axial CT images were obtained of the abdomen and pelvis following the uneventful intravenous administration of 85 mL Isovue-300. Reconstructed coronal and sagittal images were also obtained. Automated exposure control and iterative construction methods were used. Findings: Liver: The liver is enlarged and heterogeneously decreased in attenuation, compatible with hepatic steatosis. No focal liver lesion is seen. No biliary dilation is seen. Gallbladder: Unremarkable. Pancreas: Unremarkable. Spleen: Unremarkable. Adrenal glands: 1 cm low-density nodule of the right adrenal gland, similar since the prior study, likely an adenoma. Left adrenal gland is unremarkable. Genitourinary tract: Kidneys are unremarkable. No hydronephrosis is seen. Visualized portions of the ureters and urinary bladder appear unremarkable. Status post hysterectomy. Gastrointestinal tract: Colonic diverticulosis is present. Colon is mostly decompressed which limits its evaluation. Scattered fluid-filled, nondistended small bowel, nonspecific. No findings to suggest bowel obstruction. Duodenal diverticulum is seen. Appendix: No findings to suggest acute appendicitis. Other findings: No free air or free fluid is identified. No pathologically enlarged lymph nodes are seen. The abdominal aorta and IVC appear unremarkable. Bones and soft tissues: No acute osseous lesion  is identified. Moderate compression fracture of L3 with progression of height loss since 10/30/2024. Lung bases: The visualized lung bases are clear. Impression: 1.Scattered fluid-filled, nondistended small bowel, nonspecific. Findings could reflect a mild enteritis. 2.Colonic diverticulosis. 3.Hepatic steatosis. 4.Moderate compression fracture of L3 with progression of height loss since 10/30/2024. 5.Additional findings as detailed above. Electronically Signed: Elian Lewis MD  4/7/2025 11:38 AM EDT  Workstation ID: [id number]    Result Date: 4/7/2025  CT ABDOMEN PELVIS W CONTRAST Date of Exam: 4/7/2025 11:15 AM EDT Indication: mid   epigastric pain. Comparison: CT of the abdomen and pelvis dated 10/30/2024 Technique: Axial CT images were obtained of the abdomen and pelvis following the uneventful intravenous administration of 85 mL Isovue-300. Reconstructed coronal and sagittal images were also obtained. Automated exposure control and iterative construction methods were used. Findings: Liver: The liver is enlarged and heterogeneously decreased in attenuation, compatible with hepatic steatosis. No focal liver lesion is seen. No biliary dilation is seen. Gallbladder: Unremarkable. Pancreas: Unremarkable. Spleen: Unremarkable. Adrenal glands: 1 cm low-density nodule of the right adrenal gland, similar since the prior study, likely an adenoma. Left adrenal gland is unremarkable. Genitourinary tract: Kidneys are unremarkable. No hydronephrosis is seen. Visualized portions of the ureters and urinary bladder appear unremarkable. Status post hysterectomy. Gastrointestinal tract: Colonic diverticulosis is present. Colon is mostly decompressed which limits its evaluation. Scattered fluid-filled, nondistended small bowel, nonspecific. No findings to suggest bowel obstruction. Duodenal diverticulum is seen. Appendix: No findings to suggest acute appendicitis. Other findings: No free air or free fluid is identified. No  pathologically enlarged lymph nodes are seen. The abdominal aorta and IVC appear unremarkable. Bones and soft tissues: No acute osseous lesion is identified. Moderate compression fracture of L3 with progression of height loss since 10/30/2024. Lung bases: The visualized lung bases are clear.     Impression: Impression: 1.Scattered fluid-filled, nondistended small bowel, nonspecific. Findings could reflect a mild enteritis. 2.Colonic diverticulosis. 3.Hepatic steatosis. 4.Moderate compression fracture of L3 with progression of height loss since 10/30/2024. 5.Additional findings as detailed above. Electronically Signed: Elian Lewis MD  4/7/2025 11:38 AM EDT  Workstation ID: [id number]    CT Chest With Contrast Diagnostic  Result Date: 4/7/2025  CT CHEST W CONTRAST DIAGNOSTIC Date of Exam: 4/7/2025 11:15 AM EDT Indication: epigastric pain dizzy low sats. Comparison: None available. Technique: Axial CT images were obtained of the chest after the uneventful intravenous administration of 85 cc Isovue-300.  Reconstructed coronal and sagittal images were also obtained. Automated exposure control and iterative construction methods were used. Findings: MEDIASTINUM: Unremarkable. Aortic and heart size are normal. No mass nor pericardial effusion. CORONARY ARTERIES: No calcified atherosclerotic disease. LUNGS: Minimal gravity dependent atelectasis. No consolidation. No significant nodule nor interstitial changes. PLEURAL SPACE: No effusion, mass, nor pneumothorax. LYMPH NODES: There are no pathologically enlarged lymph nodes. UPPER ABDOMEN: Hepatic steatosis with some sparing along the gallbladder fossa. OSSEOUS STRUCTURES: Appropriate for age with no acute process identified.     Impression: Impression: 1.No acute cardiopulmonary findings. 2.Hepatic steatosis. Electronically Signed: Severo Humphrey MD  4/7/2025 11:37 AM EDT  Workstation ID: [id number]    XR Chest 1 View  Result Date: 4/7/2025  XR CHEST 1 VW Date of Exam:  4/7/2025 10:53 AM EDT Indication: low sats Comparison: 10/30/2024 Findings: Cardiomediastinal silhouette is unremarkable.  No airspace disease, pneumothorax, nor pleural effusion. No acute osseous abnormality identified.     Impression: Impression: No active disease. Electronically Signed: Severo Humphrey MD  4/7/2025 11:35 AM EDT  Workstation ID: [id number]                                                                    Medical Decision Making  Patient Presentation 64-year-old female with abdominal pain, nausea vomiting, intermittent dizziness    DDX. Differential would include biliary leak cholecystitis, cholelithiasis, cholangitis, colitis, diverticulitis, hepatic abscess, hepatic mass, possible right lower lobe pneumonia, nephrolithiasis, pyelonephritis, uti       Data Review/ Non ED Records /Analysis/Ordering unique tests  Review of previous  non ED visits, prior labs, prior imaging, available notes from prior evaluations or visits with specialists, medication list, allergies, past medical history, past surgical history        Independent Review Studies  I Personally reviewed all laboratory studies performed in the emergency department     Intervention/Re-evaluation intervention included fluids, IV pain medication and antiemetics    Independent Clinician no consultation    Risk Stratification tools/clinical decision rules 64-year-old female presented with abdominal pain, right upper quadrant, and epigastric pain, as well as nausea vomiting and intermittent dizziness.  I considered, gallbladder disease, pancreatitis, enteritis, colitis, labs were drawn, patient did have an incidental finding of cholelithiasis no acute cholecystitis, she had no pain with eating, her lactic was initially elevated, but returned to normal with fluid bolus.  On reevaluation after the patient received fluids, pain medicine and antiemetics, and considering that a family member had recently had a stomach virus, this seems the most likely  cause.  She was given signs symptoms look for to return    Shared Decision Making discussed this with patient and she was agreeable    Disposition patient stable for discharge    Problems Addressed:  Nausea and vomiting, unspecified vomiting type: complicated acute illness or injury    Amount and/or Complexity of Data Reviewed  External Data Reviewed: labs, radiology and notes.  Labs: ordered. Decision-making details documented in ED Course.  Radiology: ordered. Decision-making details documented in ED Course.    Risk  Prescription drug management.          Final diagnoses:   Nausea and vomiting, unspecified vomiting type           Disposition DISCHARGE    Patient discharged in stable condition.    Reviewed implications of results, diagnosis, meds, responsibility to follow up, warning signs and symptoms of possible worsening, potential complications and reasons to return to ER.    Patient/Family voiced understanding of above instructions.    Discussed plan for discharge, as there is no emergent indication for admission.  Pt/family is agreeable and understands need for follow up and possible repeat testing.  Pt/family is aware that discharge does not mean that nothing is wrong but that it indicates no emergency is currently present that requires admission and they must continue care with follow-up as given below or with a physician of their choice.     FOLLOW-UP  [facility] EMERGENCY DEPARTMENT  [address] [facility] [id number] [phone]    If symptoms worsen    Sunitha Tobin MD  [address] [address] [phone]    Schedule an appointment as soon as possible for a visit            Medication List        New Prescriptions      ondansetron 4 MG tablet  Commonly known as: ZOFRAN  Take 1 tablet by mouth Every 6 (Six) Hours As Needed for Nausea or Vomiting.               Where to Get Your Medications        These medications were sent to [facility]  [facility] - [place] [id number]      Hours: Monday to Friday 7 AM to 5:30 PM, Saturday & Sunday 8 AM to 4:30 PM Phone: [phone]   ondansetron 4 MG tablet             Neville Roche Jr., PA-C  04/09/25 [id number]

## 2025-04-07 NOTE — Clinical Note
[facility] EMERGENCY DEPARTMENT  [address]  [facility] [address]  Phone: [phone]    Eliz Velazquez was seen and treated in our emergency department on 4/7/2025.  She may return to work on 04/09/2025.         Thank you for choosing [facility].    Neville Roche Jr., DIONNA

## 2025-04-08 ENCOUNTER — HOSPITAL ENCOUNTER (EMERGENCY)
Facility: HOSPITAL | Age: 65
Discharge: HOME | End: 2025-04-08
Attending: EMERGENCY MEDICINE | Admitting: EMERGENCY MEDICINE
Payer: COMMERCIAL

## 2025-04-08 ENCOUNTER — APPOINTMENT (OUTPATIENT)
Dept: GENERAL RADIOLOGY | Facility: HOSPITAL | Age: 65
End: 2025-04-08
Payer: COMMERCIAL

## 2025-04-08 VITALS
HEIGHT: 63 IN | TEMPERATURE: 99 F | DIASTOLIC BLOOD PRESSURE: 72 MMHG | OXYGEN SATURATION: 98 % | SYSTOLIC BLOOD PRESSURE: 127 MMHG | WEIGHT: 226 LBS | BODY MASS INDEX: 40.04 KG/M2 | RESPIRATION RATE: 16 BRPM | HEART RATE: 73 BPM

## 2025-04-08 DIAGNOSIS — N12 PYELONEPHRITIS: Primary | ICD-10-CM

## 2025-04-08 DIAGNOSIS — E86.0 DEHYDRATION: ICD-10-CM

## 2025-04-08 DIAGNOSIS — M79.10 MYALGIA: ICD-10-CM

## 2025-04-08 LAB
ALBUMIN SERPL-MCNC: 3.5 G/DL (ref 3.5–5.2)
ALBUMIN/GLOB SERPL: 1.2 G/DL
ALP SERPL-CCNC: 94 U/L (ref 39–117)
ALT SERPL W P-5'-P-CCNC: 56 U/L (ref 1–33)
ANION GAP SERPL CALCULATED.3IONS-SCNC: 12 MMOL/L (ref 5–15)
AST SERPL-CCNC: 37 U/L (ref 1–32)
BACTERIA UR QL AUTO: ABNORMAL /HPF
BASOPHILS # BLD AUTO: 0.01 10*3/MM3 (ref 0–0.2)
BASOPHILS NFR BLD AUTO: 0.2 % (ref 0–1.5)
BILIRUB SERPL-MCNC: 0.6 MG/DL (ref 0–1.2)
BILIRUB UR QL STRIP: ABNORMAL
BUN SERPL-MCNC: 13 MG/DL (ref 8–23)
BUN/CREAT SERPL: 21.7 (ref 7–25)
BURR CELLS BLD QL SMEAR: NORMAL
CALCIUM SPEC-SCNC: 8.1 MG/DL (ref 8.6–10.5)
CHLORIDE SERPL-SCNC: 101 MMOL/L (ref 98–107)
CK SERPL-CCNC: 92 U/L (ref 20–180)
CLARITY UR: ABNORMAL
CO2 SERPL-SCNC: 22 MMOL/L (ref 22–29)
COLOR UR: ABNORMAL
CREAT SERPL-MCNC: 0.6 MG/DL (ref 0.57–1)
D-LACTATE SERPL-SCNC: 1.5 MMOL/L (ref 0.5–2)
DEPRECATED RDW RBC AUTO: 44.3 FL (ref 37–54)
EGFRCR SERPLBLD CKD-EPI 2021: 99.8 ML/MIN/1.73
EOSINOPHIL # BLD AUTO: 0.02 10*3/MM3 (ref 0–0.4)
EOSINOPHIL NFR BLD AUTO: 0.3 % (ref 0.3–6.2)
ERYTHROCYTE [DISTWIDTH] IN BLOOD BY AUTOMATED COUNT: 13.2 % (ref 12.3–15.4)
GLOBULIN UR ELPH-MCNC: 3 GM/DL
GLUCOSE SERPL-MCNC: 141 MG/DL (ref 65–99)
GLUCOSE UR STRIP-MCNC: ABNORMAL MG/DL
HCT VFR BLD AUTO: 50.6 % (ref 34–46.6)
HGB BLD-MCNC: 16.3 G/DL (ref 12–15.9)
HGB UR QL STRIP.AUTO: ABNORMAL
HOLD SPECIMEN: NORMAL
HYALINE CASTS UR QL AUTO: ABNORMAL /LPF
IMM GRANULOCYTES # BLD AUTO: 0.02 10*3/MM3 (ref 0–0.05)
IMM GRANULOCYTES NFR BLD AUTO: 0.3 % (ref 0–0.5)
KETONES UR QL STRIP: ABNORMAL
LEUKOCYTE ESTERASE UR QL STRIP.AUTO: ABNORMAL
LIPASE SERPL-CCNC: 18 U/L (ref 13–60)
LYMPHOCYTES # BLD AUTO: 0.99 10*3/MM3 (ref 0.7–3.1)
LYMPHOCYTES NFR BLD AUTO: 15 % (ref 19.6–45.3)
MAGNESIUM SERPL-MCNC: 1.8 MG/DL (ref 1.6–2.4)
MCH RBC QN AUTO: 29.2 PG (ref 26.6–33)
MCHC RBC AUTO-ENTMCNC: 32.2 G/DL (ref 31.5–35.7)
MCV RBC AUTO: 90.7 FL (ref 79–97)
MONOCYTES # BLD AUTO: 0.5 10*3/MM3 (ref 0.1–0.9)
MONOCYTES NFR BLD AUTO: 7.6 % (ref 5–12)
MUCOUS THREADS URNS QL MICRO: ABNORMAL /HPF
NEUTROPHILS NFR BLD AUTO: 5.07 10*3/MM3 (ref 1.7–7)
NEUTROPHILS NFR BLD AUTO: 76.6 % (ref 42.7–76)
NITRITE UR QL STRIP: NEGATIVE
NRBC BLD AUTO-RTO: 0 /100 WBC (ref 0–0.2)
PH UR STRIP.AUTO: <=5 [PH] (ref 5–8)
PLATELET # BLD AUTO: 173 10*3/MM3 (ref 140–450)
PMV BLD AUTO: 11.6 FL (ref 6–12)
POLYCHROMASIA BLD QL SMEAR: NORMAL
POTASSIUM SERPL-SCNC: 3.7 MMOL/L (ref 3.5–5.2)
PROCALCITONIN SERPL-MCNC: 0.17 NG/ML (ref 0–0.25)
PROT SERPL-MCNC: 6.5 G/DL (ref 6–8.5)
PROT UR QL STRIP: ABNORMAL
RBC # BLD AUTO: 5.58 10*6/MM3 (ref 3.77–5.28)
RBC # UR STRIP: ABNORMAL /HPF
REF LAB TEST METHOD: ABNORMAL
SMALL PLATELETS BLD QL SMEAR: ADEQUATE
SODIUM SERPL-SCNC: 135 MMOL/L (ref 136–145)
SP GR UR STRIP: 1.04 (ref 1–1.03)
SQUAMOUS #/AREA URNS HPF: ABNORMAL /HPF
TSH SERPL DL<=0.05 MIU/L-ACNC: 1.67 UIU/ML (ref 0.27–4.2)
UROBILINOGEN UR QL STRIP: ABNORMAL
WBC # UR STRIP: ABNORMAL /HPF
WBC MORPH BLD: NORMAL
WBC NRBC COR # BLD AUTO: 6.61 10*3/MM3 (ref 3.4–10.8)
WHOLE BLOOD HOLD COAG: NORMAL
WHOLE BLOOD HOLD SPECIMEN: NORMAL

## 2025-04-08 PROCEDURE — 81001 URINALYSIS AUTO W/SCOPE: CPT | Performed by: EMERGENCY MEDICINE

## 2025-04-08 PROCEDURE — 85007 BL SMEAR W/DIFF WBC COUNT: CPT | Performed by: EMERGENCY MEDICINE

## 2025-04-08 PROCEDURE — 71045 X-RAY EXAM CHEST 1 VIEW: CPT

## 2025-04-08 PROCEDURE — 83690 ASSAY OF LIPASE: CPT | Performed by: EMERGENCY MEDICINE

## 2025-04-08 PROCEDURE — 99283 EMERGENCY DEPT VISIT LOW MDM: CPT

## 2025-04-08 PROCEDURE — 25810000003 SODIUM CHLORIDE 0.9 % SOLUTION: Performed by: EMERGENCY MEDICINE

## 2025-04-08 PROCEDURE — 96375 TX/PRO/DX INJ NEW DRUG ADDON: CPT

## 2025-04-08 PROCEDURE — 87040 BLOOD CULTURE FOR BACTERIA: CPT | Performed by: EMERGENCY MEDICINE

## 2025-04-08 PROCEDURE — 87086 URINE CULTURE/COLONY COUNT: CPT | Performed by: EMERGENCY MEDICINE

## 2025-04-08 PROCEDURE — 25010000002 CEFTRIAXONE PER 250 MG: Performed by: EMERGENCY MEDICINE

## 2025-04-08 PROCEDURE — 36415 COLL VENOUS BLD VENIPUNCTURE: CPT

## 2025-04-08 PROCEDURE — 96365 THER/PROPH/DIAG IV INF INIT: CPT

## 2025-04-08 PROCEDURE — 82550 ASSAY OF CK (CPK): CPT | Performed by: EMERGENCY MEDICINE

## 2025-04-08 PROCEDURE — 80050 GENERAL HEALTH PANEL: CPT | Performed by: EMERGENCY MEDICINE

## 2025-04-08 PROCEDURE — 84145 PROCALCITONIN (PCT): CPT | Performed by: EMERGENCY MEDICINE

## 2025-04-08 PROCEDURE — 83735 ASSAY OF MAGNESIUM: CPT | Performed by: EMERGENCY MEDICINE

## 2025-04-08 PROCEDURE — 83605 ASSAY OF LACTIC ACID: CPT | Performed by: EMERGENCY MEDICINE

## 2025-04-08 PROCEDURE — 25010000002 KETOROLAC TROMETHAMINE PER 15 MG: Performed by: EMERGENCY MEDICINE

## 2025-04-08 RX ORDER — CEFUROXIME AXETIL 250 MG/1
250 TABLET ORAL 2 TIMES DAILY
Qty: 14 TABLET | Refills: 0 | Status: SHIPPED | OUTPATIENT
Start: 2025-04-08

## 2025-04-08 RX ORDER — ACETAMINOPHEN 500 MG
1000 TABLET ORAL ONCE
Status: COMPLETED | OUTPATIENT
Start: 2025-04-08 | End: 2025-04-08

## 2025-04-08 RX ORDER — KETOROLAC TROMETHAMINE 15 MG/ML
15 INJECTION, SOLUTION INTRAMUSCULAR; INTRAVENOUS ONCE
Status: COMPLETED | OUTPATIENT
Start: 2025-04-08 | End: 2025-04-08

## 2025-04-08 RX ORDER — SODIUM CHLORIDE 0.9 % (FLUSH) 0.9 %
10 SYRINGE (ML) INJECTION AS NEEDED
Status: DISCONTINUED | OUTPATIENT
Start: 2025-04-08 | End: 2025-04-09 | Stop reason: HOSPADM

## 2025-04-08 RX ADMIN — KETOROLAC TROMETHAMINE 15 MG: 15 INJECTION, SOLUTION INTRAMUSCULAR; INTRAVENOUS at 20:39

## 2025-04-08 RX ADMIN — SODIUM CHLORIDE 1000 ML: 900 INJECTION, SOLUTION INTRAVENOUS at 20:36

## 2025-04-08 RX ADMIN — ACETAMINOPHEN 1000 MG: 500 TABLET, FILM COATED ORAL at 20:38

## 2025-04-08 RX ADMIN — SODIUM CHLORIDE 1000 ML: 900 INJECTION, SOLUTION INTRAVENOUS at 21:51

## 2025-04-08 RX ADMIN — CEFTRIAXONE 2000 MG: 2 INJECTION, POWDER, FOR SOLUTION INTRAMUSCULAR; INTRAVENOUS at 21:51

## 2025-04-08 NOTE — ED PROVIDER NOTES
EMERGENCY DEPARTMENT ENCOUNTER    Pt Name: Eilz Velazquez  MRN: [id number]  Pt :   1960  Room Number:    Date of encounter:  2025  PCP: Sunitha Tobin MD  ED Provider: Rajiv Saeed MD    Historian: Patient and family      HPI:  Chief Complaint: Generalized body aches         Context: Eliz Velazquez is a 64 y.o. female who presents to the ED c/o generalized bodyaches mostly in her arms and legs.  She feels somewhat weak and just miserable all over.  She reports being in our emergency department yesterday and diagnosed with gallstones without signs of significant infection.  She was complaining of abdominal pain then and continues to have some right mid abdominal region discomfort.  She has been quite nauseated.  She states that the illness started off with some diarrhea roughly 36 hours ago which is pretty much resolved.  She denies vomiting.  She feels dehydrated.      PAST MEDICAL HISTORY  Past Medical History:   Diagnosis Date    Diabetes mellitus     Fatty liver     Hypertension     Iron deficiency anemia secondary to blood loss (chronic)     heavy menses    Pure hypercholesterolemia     Seasonal allergies          PAST SURGICAL HISTORY  Past Surgical History:   Procedure Laterality Date    TONSILLECTOMY      TOTAL ABDOMINAL HYSTERECTOMY WITH SALPINGO OOPHORECTOMY      TUBAL ABDOMINAL LIGATION           FAMILY HISTORY  Family History   Problem Relation Age of Onset    Hyperlipidemia Mother     Hypertension Mother     Diabetes Mother     Obesity Mother     No Known Problems Father     Breast cancer Neg Hx     Ovarian cancer Neg Hx          SOCIAL HISTORY  Social History     Socioeconomic History    Marital status:    Tobacco Use    Smoking status: Never     Passive exposure: Never    Smokeless tobacco: Never   Vaping Use    Vaping status: Never Used   Substance and Sexual Activity    Alcohol use: No    Drug use: No    Sexual activity: Defer          ALLERGIES  Lisinopril        REVIEW OF SYSTEMS  Review of Systems       All systems reviewed and negative except for those discussed in HPI.       PHYSICAL EXAM    I have reviewed the triage vital signs and nursing notes.    ED Triage Vitals [04/08/25 1740]   Temp Heart Rate Resp BP SpO2   100.2 °F (37.9 °C) 116 16 123/91 93 %      Temp src Heart Rate Source Patient Position BP Location FiO2 (%)   Oral Monitor Sitting Left arm --       Physical Exam  GENERAL:   Appears miserable as I evaluate her in the triage area..   HENT: Nares patent.  Dry mucous membrane  EYES: No scleral icterus.  CV: Regular rhythm, tachycardic rate.  No murmurs gallops rubs  RESPIRATORY: Normal effort.  No audible wheezes, rales or rhonchi.  Clear to auscultation  ABDOMEN: Soft, mildly tender in the right mid abdominal region not right upper quadrant.  Ureña sign is negative.  MUSCULOSKELETAL: No deformities.   NEURO: Alert, moves all extremities, follows commands.  SKIN: Warm, dry, no rash visualized.      LAB RESULTS  Recent Results (from the past 24 hours)   POC Rapid Strep A    Collection Time: 04/08/25  5:38 PM    Specimen: Swab   Result Value Ref Range    Rapid Strep A Screen Negative     Internal Control Passed     Lot Number [id number]     Expiration Date 3-    Covid-19 + Flu A&B AG, Veritor    Collection Time: 04/08/25  6:07 PM    Specimen: Swab   Result Value Ref Range    COVID19 Not Detected     Influenza A Antigen LIAM Not Detected     Influenza B Antigen LIAM Not Detected     Internal Control Passed     Lot Number [id number]     Expiration Date 31,327    Lactic Acid, Plasma    Collection Time: 04/08/25  7:23 PM    Specimen: Blood   Result Value Ref Range    Lactate 1.5 0.5 - 2.0 mmol/L   Green Top (Gel)    Collection Time: 04/08/25  7:23 PM   Result Value Ref Range    Extra Tube Hold for add-ons.    Lavender Top    Collection Time: 04/08/25  7:23 PM   Result Value Ref Range    Extra Tube hold for add-on    Gold Top -  SST    Collection Time: 04/08/25  7:23 PM   Result Value Ref Range    Extra Tube Hold for add-ons.    Gray Top    Collection Time: 04/08/25  7:23 PM   Result Value Ref Range    Extra Tube Hold for add-ons.    Light Blue Top    Collection Time: 04/08/25  7:23 PM   Result Value Ref Range    Extra Tube Hold for add-ons.    CBC Auto Differential    Collection Time: 04/08/25  7:23 PM    Specimen: Blood   Result Value Ref Range    WBC 6.61 3.40 - 10.80 10*3/mm3    RBC 5.58 (H) 3.77 - 5.28 10*6/mm3    Hemoglobin 16.3 (H) 12.0 - 15.9 g/dL    Hematocrit 50.6 (H) 34.0 - 46.6 %    MCV 90.7 79.0 - 97.0 fL    MCH 29.2 26.6 - 33.0 pg    MCHC 32.2 31.5 - 35.7 g/dL    RDW 13.2 12.3 - 15.4 %    RDW-SD 44.3 37.0 - 54.0 fl    MPV 11.6 6.0 - 12.0 fL    Platelets 173 140 - 450 10*3/mm3    Neutrophil % 76.6 (H) 42.7 - 76.0 %    Lymphocyte % 15.0 (L) 19.6 - 45.3 %    Monocyte % 7.6 5.0 - 12.0 %    Eosinophil % 0.3 0.3 - 6.2 %    Basophil % 0.2 0.0 - 1.5 %    Immature Grans % 0.3 0.0 - 0.5 %    Neutrophils, Absolute 5.07 1.70 - 7.00 10*3/mm3    Lymphocytes, Absolute 0.99 0.70 - 3.10 10*3/mm3    Monocytes, Absolute 0.50 0.10 - 0.90 10*3/mm3    Eosinophils, Absolute 0.02 0.00 - 0.40 10*3/mm3    Basophils, Absolute 0.01 0.00 - 0.20 10*3/mm3    Immature Grans, Absolute 0.02 0.00 - 0.05 10*3/mm3    nRBC 0.0 0.0 - 0.2 /100 WBC   Scan Slide    Collection Time: 04/08/25  7:23 PM    Specimen: Blood   Result Value Ref Range    Yvette Cells Slight/1+ None Seen    Polychromasia Slight/1+ None Seen    WBC Morphology Normal Normal    Platelet Estimate Adequate Normal   Urinalysis With Microscopic If Indicated (No Culture) - Urine, Clean Catch    Collection Time: 04/08/25  7:25 PM    Specimen: Urine, Clean Catch   Result Value Ref Range    Color, UA Dark Yellow (A) Yellow, Straw    Appearance, UA Turbid (A) Clear    pH, UA <=5.0 5.0 - 8.0    Specific Gravity, UA 1.039 (H) 1.001 - 1.030    Glucose, UA >=1000 mg/dL (3+) (A) Negative    Ketones, UA 40 mg/dL  (2+) (A) Negative    Bilirubin, UA Small (1+) (A) Negative    Blood, UA Trace (A) Negative    Protein,  mg/dL (2+) (A) Negative    Leuk Esterase, UA Small (1+) (A) Negative    Nitrite, UA Negative Negative    Urobilinogen, UA 1.0 E.U./dL 0.2 - 1.0 E.U./dL   Urinalysis, Microscopic Only - Urine, Clean Catch    Collection Time: 04/08/25  7:25 PM    Specimen: Urine, Clean Catch   Result Value Ref Range    RBC, UA None Seen None Seen, 0-2 /HPF    WBC, UA Too Numerous to Count (A) None Seen, 0-2 /HPF    Bacteria, UA 4+ (A) None Seen, Trace /HPF    Squamous Epithelial Cells, UA 21-30 (A) None Seen, 0-2 /HPF    Hyaline Casts, UA None Seen 0 - 6 /LPF    Mucus, UA Moderate/2+ (A) None Seen, Trace /HPF    Methodology Manual Light Microscopy    Comprehensive Metabolic Panel    Collection Time: 04/08/25  9:14 PM    Specimen: Blood   Result Value Ref Range    Glucose 141 (H) 65 - 99 mg/dL    BUN 13 8 - 23 mg/dL    Creatinine 0.60 0.57 - 1.00 mg/dL    Sodium 135 (L) 136 - 145 mmol/L    Potassium 3.7 3.5 - 5.2 mmol/L    Chloride 101 98 - 107 mmol/L    CO2 22.0 22.0 - 29.0 mmol/L    Calcium 8.1 (L) 8.6 - 10.5 mg/dL    Total Protein 6.5 6.0 - 8.5 g/dL    Albumin 3.5 3.5 - 5.2 g/dL    ALT (SGPT) 56 (H) 1 - 33 U/L    AST (SGOT) 37 (H) 1 - 32 U/L    Alkaline Phosphatase 94 39 - 117 U/L    Total Bilirubin 0.6 0.0 - 1.2 mg/dL    Globulin 3.0 gm/dL    A/G Ratio 1.2 g/dL    BUN/Creatinine Ratio 21.7 7.0 - 25.0    Anion Gap 12.0 5.0 - 15.0 mmol/L    eGFR 99.8 >60.0 mL/min/1.73   Lipase    Collection Time: 04/08/25  9:14 PM    Specimen: Blood   Result Value Ref Range    Lipase 18 13 - 60 U/L   CK    Collection Time: 04/08/25  9:14 PM    Specimen: Blood   Result Value Ref Range    Creatine Kinase 92 20 - 180 U/L   TSH Rfx On Abnormal To Free T4    Collection Time: 04/08/25  9:14 PM    Specimen: Blood   Result Value Ref Range    TSH 1.670 0.270 - 4.200 uIU/mL   Procalcitonin    Collection Time: 04/08/25  9:14 PM    Specimen: Blood    Result Value Ref Range    Procalcitonin 0.17 0.00 - 0.25 ng/mL   Magnesium    Collection Time: 04/08/25  9:14 PM    Specimen: Blood   Result Value Ref Range    Magnesium 1.8 1.6 - 2.4 mg/dL       If labs were ordered, I independently reviewed the results and considered them in treating the patient.        RADIOLOGY  XR Chest 1 View  Result Date: 4/8/2025  XR CHEST 1 VW Date of Exam: 4/8/2025 6:43 PM EDT Indication: weak Comparison: CT chest 4/7/2025 Findings: The cardiomediastinal silhouette is within normal limits. Lungs are clear. No focal consolidation, pneumothorax, or significant pleural effusion. Osseous structures grossly intact.     Impression: No acute process. Electronically Signed: Stewart Nielson MD  4/8/2025 7:07 PM EDT  Workstation ID: [id number]      I ordered and independently reviewed the above noted radiographic studies.      I viewed images of chest x-ray which showed no acute disease per my independent interpretation.    See radiologist's dictation for official interpretation.        PROCEDURES    Procedures    No orders to display       MEDICATIONS GIVEN IN ER    Medications   sodium chloride 0.9 % flush 10 mL (has no administration in time range)   ketorolac (TORADOL) injection 15 mg (15 mg Intravenous Given 4/8/25 2039)   sodium chloride 0.9 % bolus 1,000 mL (0 mL Intravenous Stopped 4/8/25 2134)   acetaminophen (TYLENOL) tablet 1,000 mg (1,000 mg Oral Given 4/8/25 2038)   cefTRIAXone (ROCEPHIN) 2,000 mg in sodium chloride 0.9 % 100 mL MBP (0 mg Intravenous Stopped 4/8/25 2226)   sodium chloride 0.9 % bolus 1,000 mL (1,000 mL Intravenous New Bag 4/8/25 2151)         MEDICAL DECISION MAKING, PROGRESS, and CONSULTS    All labs, if obtained, have been independently reviewed by me.  All radiology studies, if obtained, have been reviewed by me and the radiologist dictating the report.  All EKGs, if obtained, have been independently viewed and interpreted by me/my attending physician.      Discussion  below represents my analysis of pertinent findings related to patient's condition, differential diagnosis, treatment plan and final disposition.                                          Differential diagnosis:    Very nonspecific symptoms which could reflect viral infection, bacterial infection, biliary colic or cholecystitis, etc.      Additional sources:    - Discussed/ obtained information from independent historians: Patient's male family member was present and provided some information directly to me.    - External (non-ED) record review: I reviewed the urgent care note from earlier today.  This note was incomplete but did talk about the patient's muscle aches, sore throat, shortness of breath.    Outpatient right upper quadrant ultrasound from yesterday shows cholelithiasis without cholecystitis.    - Chronic or social conditions impacting care: Obesity with BMI 40.7        Orders placed during this visit:  Orders Placed This Encounter   Procedures    Blood Culture - Blood,    Blood Culture - Blood,    Urine Culture - Urine,    XR Chest 1 View    [place] Draw    Comprehensive Metabolic Panel    Lipase    Urinalysis With Microscopic If Indicated (No Culture) - Urine, Clean Catch    Lactic Acid, Plasma    CBC Auto Differential    CK    TSH Rfx On Abnormal To Free T4    Procalcitonin    Scan Slide    Urinalysis, Microscopic Only - Urine, Clean Catch    Magnesium    Insert Peripheral IV    CBC & Differential    Green Top (Gel)    Lavender Top    Gold Top - SST    Gray Top    Light Blue Top         Additional orders considered but not ordered:  Repeat CT scan of the abdomen pelvis.    ED Course:    Consultants:      ED Course as of 04/08/25 2300   Tue Apr 08, 2025   1812 100.4 by me. [MS]   2134 Bacteria, UA(!): 4+ [MS]   2134 WBC, UA(!): Too Numerous to Count [MS]   2134 Ketones, UA(!): 40 mg/dL (2+) [MS]   2215 Feeling much improved on recheck by myself.  I spoke with the patient about admission but she feels  strongly that she is fine to go home.  We are continuing to hydrate her and she has received her Rocephin.  She understands the need to return immediately if worse [MS]      ED Course User Index  [MS] Rajiv Saeed MD              Shared Decision Making:  After my consideration of clinical presentation and any laboratory/radiology studies obtained, I discussed the findings with the patient/patient representative who is in agreement with the treatment plan and the final disposition.   Risks and benefits of discharge and/or observation/admission were discussed.       AS OF 23:00 EDT VITALS:    BP - 127/72  HR - 75  TEMP - 99 °F (37.2 °C) (Oral)  O2 SATS - 98%                  DIAGNOSIS  Final diagnoses:   Pyelonephritis   Dehydration   Myalgia         DISPOSITION  DISCHARGE    Patient discharged in stable condition.    Reviewed implications of results, diagnosis, meds, responsibility to follow up, warning signs and symptoms of possible worsening, potential complications and reasons to return to ER.    Patient/Family voiced understanding of above instructions.    Discussed plan for discharge, as there is no emergent indication for admission.  Pt/family is agreeable and understands need for follow up and possible repeat testing.  Pt/family is aware that discharge does not mean that nothing is wrong but that it indicates no emergency is currently present that requires admission and they must continue care with follow-up as given below or with a physician of their choice.     FOLLOW-UP  Sunitha Tobin MD  [address]  [address] [address] [phone]      NEXT AVAILABLE APPOINTMENT - RECHECK OF CONDITION    [facility] EMERGENCY DEPARTMENT  [address] [facility] [address] [phone]    IF YOU HAVE ANY CONCERN OF WORSENING CONDITION         Medication List        New Prescriptions      cefuroxime 250 MG tablet  Commonly known as: CEFTIN  Take 1 tablet by mouth 2  (Two) Times a Day.               Where to Get Your Medications        These medications were sent to [facility] - [place] [address]      Hours: Monday to Friday 7 AM to 5:30 PM, Saturday & Sunday 8 AM to 4:30 PM Phone: [phone]   cefuroxime 250 MG tablet             Please note that portions of this document were completed with voice recognition software.        Rajiv Saeed MD  04/09/25 5939

## 2025-04-09 LAB — BACTERIA SPEC AEROBE CULT: NO GROWTH

## 2025-04-09 NOTE — DISCHARGE INSTRUCTIONS
We have given you a strong dose of antibiotics here in the emergency department prior to discharge.  I have sent in a prescription for more antibiotics, 1 week worth, to be started tomorrow morning.    Push fluids.  Utilize the Zofran that was written earlier for you if you become nauseated.  Do not worry about eating but stay well-hydrated.    If you have any concern or worsening condition please return emergency department.    Other than noted above,  Please review the medications you are supposed to be taking according to prior physician recommendations. I have not changed your home medications during this visit. If your discharge instructions indicate that I have changed your home medications, this is not the case, and you should disregard. If you have any questions about the medication you should be taking at home, please call your physician.

## 2025-04-10 ENCOUNTER — TELEPHONE (OUTPATIENT)
Age: 65
End: 2025-04-10

## 2025-04-10 ENCOUNTER — OFFICE VISIT (OUTPATIENT)
Age: 65
End: 2025-04-10
Payer: COMMERCIAL

## 2025-04-10 VITALS
DIASTOLIC BLOOD PRESSURE: 90 MMHG | HEIGHT: 63 IN | OXYGEN SATURATION: 96 % | WEIGHT: 215 LBS | HEART RATE: 70 BPM | BODY MASS INDEX: 38.09 KG/M2 | TEMPERATURE: 97.5 F | SYSTOLIC BLOOD PRESSURE: 130 MMHG

## 2025-04-10 DIAGNOSIS — G44.89 OTHER HEADACHE SYNDROME: ICD-10-CM

## 2025-04-10 DIAGNOSIS — A08.4 VIRAL GASTROENTERITIS: Primary | ICD-10-CM

## 2025-04-10 RX ORDER — KETOROLAC TROMETHAMINE 30 MG/ML
30 INJECTION, SOLUTION INTRAMUSCULAR; INTRAVENOUS ONCE
Status: COMPLETED | OUTPATIENT
Start: 2025-04-10 | End: 2025-04-10

## 2025-04-10 RX ADMIN — KETOROLAC TROMETHAMINE 30 MG: 30 INJECTION, SOLUTION INTRAMUSCULAR; INTRAVENOUS at 09:11

## 2025-04-10 NOTE — PROGRESS NOTES
Chief Complaint  Hospital Follow Up Visit, Abdominal Pain, and Headache    Subjective        Eliz Velazquez presents to [facility]  History of Present Illness    History of Present Illness  The patient is a 64-year-old female presenting for an emergency room follow-up visit and FMLA paperwork.    She has been to the ER twice. Her symptoms began on Monday, 04/07/2025, at approximately 2:00 AM. She experienced a sensation of gagging and nausea, accompanied by a burning sensation in her stomach, specifically around the umbilical region. Despite these symptoms, she managed to go back to sleep after hydrating with water. Upon waking at 5:00 AM, she prepared breakfast for her  but refrained from consuming coffee due to persistent stomach discomfort. She took her Rybelsus medication with half a bottle of water. While preparing for work, she experienced an urge to vomit while putting on her waistband brace, which she attributed to potential food poisoning from a meal consumed the previous day. She attempted to wear the brace at work but was unable to do so due to vomiting, which was characterized by the presence of mucus. She also experienced diarrhea upon arrival at work. Despite these symptoms, she continued to work, hydrating with Gatorade due to excessive thirst. Around 9:00 AM, she reported feeling dizzy and experiencing a severe headache, prompting her supervisor to take her to the ER. She was discharged without any medication, with a diagnosis of gallbladder stones. The following day, her condition deteriorated, leading to a visit to urgent care where she presented with slurred speech and confusion. She was advised to return to the emergency department. Upon arrival, she was found to have a fever and was kept in the hallway due to lack of bed availability. She was later discharged without a work note. Currently, she reports feeling fatigued and requires support for mobility.  "She is unable to stand for extended periods and reports a sensation of heat in her eyes. She has not experienced any vomiting or diarrhea in the past 24 hours. Her fluid intake consists of water, Gatorade, and tea. She reports no current fever and states that her oxygen levels were normal during her last check. She reports a persistent headache localized to the left side of her head. She has been taking ondansetron for nausea and does not require a refill. She has not taken Celebrex today. She does not feel worse than she did two days ago and believes her condition is gradually improving.        Objective   Vital Signs:  /90 (BP Location: Right arm, Patient Position: Sitting, Cuff Size: Large Adult)   Pulse 70   Temp 97.5 °F (36.4 °C) (Infrared)   Ht 158.8 cm (62.52\")   Wt 97.5 kg (215 lb)   SpO2 96%   BMI 38.67 kg/m²   Estimated body mass index is 38.67 kg/m² as calculated from the following:    Height as of this encounter: 158.8 cm (62.52\").    Weight as of this encounter: 97.5 kg (215 lb).            The following portions of the patient's history were reviewed and updated as appropriate: allergies, current medications, past family history, past medical history, past social history, past surgical history, and problem list.       Physical Exam  Vitals reviewed.   Constitutional:       General: She is not in acute distress.     Appearance: She is obese. She is ill-appearing. She is not toxic-appearing or diaphoretic.   HENT:      Mouth/Throat:      Mouth: Mucous membranes are dry. No oral lesions.   Cardiovascular:      Rate and Rhythm: Normal rate and regular rhythm.   Pulmonary:      Effort: Pulmonary effort is normal.      Breath sounds: Normal breath sounds.   Abdominal:      General: Bowel sounds are normal.      Palpations: Abdomen is soft.      Tenderness: There is no abdominal tenderness. There is no guarding or rebound.   Neurological:      Mental Status: She is alert.   Psychiatric:         " Mood and Affect: Mood normal.        Result Review :  The following data was reviewed by: Sunitha Tobin MD on 04/10/2025:         XR Chest 1 View (04/08/2025 18:51)  US Gallbladder (04/07/2025 12:05)  CT Chest With Contrast Diagnostic (04/07/2025 11:24)  CT Abdomen Pelvis With Contrast (04/07/2025 11:24)  XR Chest 1 View (04/07/2025 11:19)    Magnesium (04/08/2025 21:14)  Procalcitonin (04/08/2025 21:14)  TSH Rfx On Abnormal To Free T4 (04/08/2025 21:14)  Lipase (04/08/2025 21:14)  CK (04/08/2025 21:14)  Comprehensive Metabolic Panel (04/08/2025 21:14)  Blood Culture - Blood, Arm, Right (04/08/2025 21:10)  Blood Culture - Blood, Arm, Left (04/08/2025 21:00)  Urine Culture - Urine, Urine, Clean Catch (04/08/2025 19:25)  Urinalysis, Microscopic Only - Urine, Clean Catch (04/08/2025 19:25)  Urinalysis With Microscopic If Indicated (No Culture) - Urine, Clean Catch (04/08/2025 19:25)  Lactic Acid, Plasma (04/08/2025 19:23)  CBC & Differential (04/08/2025 19:23)  Covid-19 + Flu A&B AG, Veritor (04/08/2025 18:07)  POC Rapid Strep A (04/08/2025 17:38)    ED with Rajiv Saeed MD (04/08/2025)cefTRIAXone (ROCEPHIN) 2,000 mg   UC with Robert Collins PA-C (04/08/2025)  ED with Severo Garvin MD (04/07/2025)      Administrations This Visit       ketorolac (TORADOL) injection 30 mg       Admin Date  04/10/2025 Action  Given Dose  30 mg Route  Intramuscular Documented By  Patti Garcia MA                    She tolerated the injection well.      Assessment and Plan   Diagnoses and all orders for this visit:    1. Viral gastroenteritis (Primary)    2. Other headache syndrome  -     ketorolac (TORADOL) injection 30 mg             Assessment & Plan  1.  Viral gastroenteritis  Her oxygen saturation is currently at 96 percent. Recent CT scans of the chest and two chest x-rays did not reveal any signs of pulmonary disease. Tests for influenza, COVID-19, and strep were all negative.She is scheduled to resume work on  Monday. If her condition deteriorates over the weekend, she should seek immediate medical attention at the emergency room rather than urgent care, as the former can provide necessary scans and blood work.  [facility] paperwork completed.  See scanned documents.    2. Headache.   A Toradol injection will be administered today for her headache. She is advised to abstain from taking Celebrex until tomorrow due to the administration of the pain injection today.       Follow Up   Return if symptoms worsen or fail to improve.  Patient was given instructions and counseling regarding her condition or for health maintenance advice. Please see specific information pulled into the AVS if appropriate.         Patient or patient representative verbalized consent for the use of Ambient Listening during the visit with  Sunitha Tobin MD for chart documentation. 4/10/2025  09:00 EDT    Electronically signed by Sunitha Tobin MD, 04/10/25, 12:49 PM EDT.

## 2025-04-10 NOTE — TELEPHONE ENCOUNTER
Caller: Eliz Velazquez    Relationship: Self    Best call back number:      [phone] (Mobile)     What form or medical record are you requesting:     PATIENT STATED SHE WAS ADVISED THAT SHE DOESN'T HAVE ANY MORE DAYS AVAILABLE FOR FMLA    PATIENT ALSO STATED SHE WAS GIVEN ADDITIONAL PAPERWORK FOR DR COURTNEY TO COMPLETE    PATIENT STATED SHE WILL DROP THE PAPERWORK OFF AT THE  THIS AFTERNOON    Who is requesting this form or medical record from you:    PATIENT'S EMPLOYER    How would you like to receive the form or medical records (pick-up, mail, fax):     PATIENT STATED COMPLETED PAPERWORK WILL BE PICKED UP FROM THE  WHEN READY   Tried reaching out to patient- unable to leave message. Didn't give the option, just hangs up if doesn't answer.

## 2025-04-10 NOTE — LETTER
April 10, 2025     Patient: Eliz Velazquez   YOB: 1960   Date of Visit: 4/10/2025       To Whom It May Concern:    It is my medical opinion that Eliz Velazquez may return to work on 4/14/25.            Sincerely,        Sunitha Tobin MD    CC: No Recipients

## 2025-04-13 LAB
BACTERIA SPEC AEROBE CULT: NORMAL
BACTERIA SPEC AEROBE CULT: NORMAL

## 2025-05-02 ENCOUNTER — TELEPHONE (OUTPATIENT)
Age: 65
End: 2025-05-02
Payer: COMMERCIAL

## 2025-05-02 NOTE — TELEPHONE ENCOUNTER
Caller: Eliz Velazquez    Relationship: Self    Best call back number: [phone]       What was the call regarding: PATIENT WANTS TO KNOW IF ANY OF THE MEDICATIONS SHE IS TAKING COULD BE CAUSING ANY KIDNEY ISSUES. PATIENT REPORTS THAT SHE SWEATS A LOT, NOT BEING ABLE TO EAT A LOT, HAS DIARRHEA THAT WAS YELLOW BUT HAS TURNED BLACK. EXPERIENCED NAUSEA, VOMITING, DIZZINESS, WENT TO THE ER AND WAS DIAGNOSED WITH KIDNEY INFECTION, GIVEN ANTIBIOTICS. STILL EXPERIENCING HEAVY NESS AND NOT BEING ABLE TO FUNCTION. DIARRHEA IS STILL VERY BAD.     Is it okay if the provider responds through MyChart: NO

## 2025-05-09 ENCOUNTER — OFFICE VISIT (OUTPATIENT)
Age: 65
End: 2025-05-09
Payer: COMMERCIAL

## 2025-05-09 ENCOUNTER — LAB (OUTPATIENT)
Age: 65
End: 2025-05-09
Payer: COMMERCIAL

## 2025-05-09 VITALS
HEIGHT: 63 IN | DIASTOLIC BLOOD PRESSURE: 80 MMHG | SYSTOLIC BLOOD PRESSURE: 134 MMHG | HEART RATE: 74 BPM | OXYGEN SATURATION: 96 % | BODY MASS INDEX: 38.54 KG/M2 | WEIGHT: 217.5 LBS | TEMPERATURE: 97.1 F

## 2025-05-09 DIAGNOSIS — Z51.81 THERAPEUTIC DRUG MONITORING: ICD-10-CM

## 2025-05-09 DIAGNOSIS — E11.65 TYPE 2 DIABETES MELLITUS WITH HYPERGLYCEMIA, WITHOUT LONG-TERM CURRENT USE OF INSULIN: Primary | ICD-10-CM

## 2025-05-09 DIAGNOSIS — L60.8 TOENAIL DEFORMITY: ICD-10-CM

## 2025-05-09 DIAGNOSIS — R19.7 DIARRHEA, UNSPECIFIED TYPE: ICD-10-CM

## 2025-05-09 LAB
ALBUMIN SERPL-MCNC: 3.8 G/DL (ref 3.5–5.2)
ALBUMIN/GLOB SERPL: 1.2 G/DL
ALP SERPL-CCNC: 116 U/L (ref 39–117)
ALT SERPL W P-5'-P-CCNC: 46 U/L (ref 1–33)
ANION GAP SERPL CALCULATED.3IONS-SCNC: 9 MMOL/L (ref 5–15)
AST SERPL-CCNC: 33 U/L (ref 1–32)
BILIRUB SERPL-MCNC: 0.2 MG/DL (ref 0–1.2)
BUN SERPL-MCNC: 16 MG/DL (ref 8–23)
BUN/CREAT SERPL: 27.6 (ref 7–25)
CALCIUM SPEC-SCNC: 9.3 MG/DL (ref 8.6–10.5)
CHLORIDE SERPL-SCNC: 106 MMOL/L (ref 98–107)
CO2 SERPL-SCNC: 25 MMOL/L (ref 22–29)
CREAT SERPL-MCNC: 0.58 MG/DL (ref 0.57–1)
EGFRCR SERPLBLD CKD-EPI 2021: 100.6 ML/MIN/1.73
GLOBULIN UR ELPH-MCNC: 3.1 GM/DL
GLUCOSE SERPL-MCNC: 154 MG/DL (ref 65–99)
POTASSIUM SERPL-SCNC: 4.4 MMOL/L (ref 3.5–5.2)
PROT SERPL-MCNC: 6.9 G/DL (ref 6–8.5)
SODIUM SERPL-SCNC: 140 MMOL/L (ref 136–145)

## 2025-05-09 PROCEDURE — 82043 UR ALBUMIN QUANTITATIVE: CPT | Performed by: FAMILY MEDICINE

## 2025-05-09 PROCEDURE — 82570 ASSAY OF URINE CREATININE: CPT | Performed by: FAMILY MEDICINE

## 2025-05-09 PROCEDURE — 99214 OFFICE O/P EST MOD 30 MIN: CPT | Performed by: FAMILY MEDICINE

## 2025-05-09 PROCEDURE — 80053 COMPREHEN METABOLIC PANEL: CPT | Performed by: FAMILY MEDICINE

## 2025-05-09 PROCEDURE — 85025 COMPLETE CBC W/AUTO DIFF WBC: CPT | Performed by: FAMILY MEDICINE

## 2025-05-09 PROCEDURE — 36415 COLL VENOUS BLD VENIPUNCTURE: CPT | Performed by: FAMILY MEDICINE

## 2025-05-09 RX ORDER — GLIPIZIDE 5 MG/1
5 TABLET, FILM COATED, EXTENDED RELEASE ORAL DAILY
Qty: 90 TABLET | Refills: 1 | Status: SHIPPED | OUTPATIENT
Start: 2025-05-09

## 2025-05-09 NOTE — PROGRESS NOTES
"Chief Complaint  Diarrhea    Subjective        Eliz Velazquez presents to [facility]  History of Present Illness    History of Present Illness  The patient is a 64-year-old female presenting for a follow-up on diarrhea.    She has been experiencing diarrhea since her diagnosis of a kidney infection. Initially, the symptoms were mild with slightly loose bowel movements, but they have progressively worsened to the point where she is now experiencing black watery stools. This change in stool consistency occurred over a week, during which she was not consuming any food. Her diet has been limited to crackers, Gatorade, water, peanut butter and jelly, bread, and potatoes. Over the past 5 days, she has consumed naan bread with cheese, a plain bagel, 2 tablespoons of oatmeal, steak, potato salad, and cucumber slices. She reports no abdominal pain. She has not had a bowel movement since yesterday morning, which is unusual as she typically has bowel movements early in the morning and possibly twice during the day. She has completed her course of antibiotics.    She also reports nausea within 10 to 20 minutes of taking Rybelsus. She is currently on Óscar, Jardiance and Rybelsus 3 mg for diabetes management. She did not experience any adverse effects from glipizide last year.    Objective   Vital Signs:  /80 (BP Location: Left arm, Patient Position: Sitting, Cuff Size: Adult)   Pulse 74   Temp 97.1 °F (36.2 °C) (Temporal)   Ht 158.8 cm (62.52\")   Wt 98.7 kg (217 lb 8 oz)   SpO2 96%   BMI 39.12 kg/m²   Estimated body mass index is 39.12 kg/m² as calculated from the following:    Height as of this encounter: 158.8 cm (62.52\").    Weight as of this encounter: 98.7 kg (217 lb 8 oz).            The following portions of the patient's history were reviewed and updated as appropriate: allergies, current medications, past family history, past medical history, past social history, past " surgical history, and problem list.       Physical Exam  Vitals reviewed.   Constitutional:       General: She is not in acute distress.     Appearance: She is not ill-appearing, toxic-appearing or diaphoretic.   Cardiovascular:      Rate and Rhythm: Normal rate and regular rhythm.   Pulmonary:      Effort: Pulmonary effort is normal.      Breath sounds: Normal breath sounds.   Abdominal:      General: Bowel sounds are normal. There is no distension.      Palpations: Abdomen is soft.      Tenderness: There is no abdominal tenderness. There is no guarding or rebound.   Neurological:      Mental Status: She is alert.   Psychiatric:         Mood and Affect: Mood normal.        Result Review :  The following data was reviewed by: Sunitha Tobin MD on 05/09/2025:  Common labs          4/4/2025    09:17 4/7/2025    10:01 4/8/2025    19:23 4/8/2025    21:14   Common Labs   Glucose  216   141    BUN  18   13    Creatinine  0.76   0.60    Sodium  140   135    Potassium  4.5   3.7    Chloride  102   101    Calcium  9.7   8.1    Albumin  4.8   3.5    Total Bilirubin  0.6   0.6    Alkaline Phosphatase  162   94    AST (SGOT)  44   37    ALT (SGPT)  65   56    WBC  9.89  6.61     Hemoglobin  16.1  16.3     Hematocrit  50.9  50.6     Platelets  238  173     Hemoglobin A1C 6.9         A1C Last 3 Results          10/3/2024    08:47 1/3/2025    10:24 4/4/2025    09:17   HGBA1C Last 3 Results   Hemoglobin A1C 6.8  8.3  6.9           CT Abdomen Pelvis With Contrast (04/07/2025 11:24)      Assessment and Plan   Diagnoses and all orders for this visit:    1. Type 2 diabetes mellitus with hyperglycemia, without long-term current use of insulin (Primary)  -     Microalbumin / Creatinine Urine Ratio - Urine, Clean Catch; Future  -     Microalbumin / Creatinine Urine Ratio - Urine, Clean Catch  -     glipizide (glipiZIDE XL) 5 MG ER tablet; Take 1 tablet by mouth Daily.  Dispense: 90 tablet; Refill: 1    2. Diarrhea, unspecified type  -      Gastrointestinal Panel, PCR - Stool, Per Rectum; Future  -     Clostridioides difficile Toxin, PCR - Stool, Per Rectum; Future  -     CBC Auto Differential; Future  -     Comprehensive Metabolic Panel; Future             Assessment & Plan  1. Diarrhea.  - Symptoms began last month initially mild but progressed to black watery stools over a week.  - No abdominal pain reported; last bowel movement was over 24 hours ago, which is atypical for her.  - Stool kit will be provided to test for infections, including C. difficile; blood draw to check white blood cell count for infection and anemia.  - Advised to collect stool sample at home and return it to any of the [facility] labs.  - Additional concern is it could be medication side effect from metformin and/or Rybelsus.    2. Diabetes mellitus.  - Currently on metformin, Jardiance and Rybelsus 3 mg; no adverse effects from glipizide last year.  - Metformin will be discontinued due to its potential to cause diarrhea.  - Rybelsus will also be discontinued due to nausea  ; Jardiance regimen will be maintained.  - Glipizide 5 mg will be reintroduced, to be taken once daily.    Follow-up  The patient is scheduled for a follow-up visit in 07/2025 or earlier if her condition does not improve.      Follow Up   Return in about 8 weeks (around 7/7/2025) for as scheduled.  Patient was given instructions and counseling regarding her condition or for health maintenance advice. Please see specific information pulled into the AVS if appropriate.         Patient or patient representative verbalized consent for the use of Ambient Listening during the visit with  Sunitha Tobin MD for chart documentation. 5/9/2025  09:49 EDT    Electronically signed by Sunitha Tobin MD, 05/09/25, 10:07 AM EDT.

## 2025-05-10 ENCOUNTER — LAB (OUTPATIENT)
Dept: LAB | Facility: HOSPITAL | Age: 65
End: 2025-05-10
Payer: COMMERCIAL

## 2025-05-10 DIAGNOSIS — R19.7 DIARRHEA, UNSPECIFIED TYPE: ICD-10-CM

## 2025-05-10 LAB
ADV 40+41 DNA STL QL NAA+NON-PROBE: NOT DETECTED
ALBUMIN UR-MCNC: <1.2 MG/DL
ASTRO TYP 1-8 RNA STL QL NAA+NON-PROBE: DETECTED
BASOPHILS # BLD AUTO: 0.03 10*3/MM3 (ref 0–0.2)
BASOPHILS NFR BLD AUTO: 0.6 % (ref 0–1.5)
C CAYETANENSIS DNA STL QL NAA+NON-PROBE: NOT DETECTED
C COLI+JEJ+UPSA DNA STL QL NAA+NON-PROBE: NOT DETECTED
C DIFF TOX GENS STL QL NAA+PROBE: NOT DETECTED
CREAT UR-MCNC: 34.8 MG/DL
CRYPTOSP DNA STL QL NAA+NON-PROBE: NOT DETECTED
DEPRECATED RDW RBC AUTO: 39.5 FL (ref 37–54)
E HISTOLYT DNA STL QL NAA+NON-PROBE: NOT DETECTED
EAEC PAA PLAS AGGR+AATA ST NAA+NON-PRB: NOT DETECTED
EC STX1+STX2 GENES STL QL NAA+NON-PROBE: NOT DETECTED
EOSINOPHIL # BLD AUTO: 0.16 10*3/MM3 (ref 0–0.4)
EOSINOPHIL NFR BLD AUTO: 3.1 % (ref 0.3–6.2)
EPEC EAE GENE STL QL NAA+NON-PROBE: NOT DETECTED
ERYTHROCYTE [DISTWIDTH] IN BLOOD BY AUTOMATED COUNT: 12.2 % (ref 12.3–15.4)
ETEC LTA+ST1A+ST1B TOX ST NAA+NON-PROBE: NOT DETECTED
G LAMBLIA DNA STL QL NAA+NON-PROBE: NOT DETECTED
HCT VFR BLD AUTO: 39.9 % (ref 34–46.6)
HGB BLD-MCNC: 13.1 G/DL (ref 12–15.9)
IMM GRANULOCYTES # BLD AUTO: 0.03 10*3/MM3 (ref 0–0.05)
IMM GRANULOCYTES NFR BLD AUTO: 0.6 % (ref 0–0.5)
LYMPHOCYTES # BLD AUTO: 1.34 10*3/MM3 (ref 0.7–3.1)
LYMPHOCYTES NFR BLD AUTO: 25.6 % (ref 19.6–45.3)
MCH RBC QN AUTO: 29.1 PG (ref 26.6–33)
MCHC RBC AUTO-ENTMCNC: 32.8 G/DL (ref 31.5–35.7)
MCV RBC AUTO: 88.7 FL (ref 79–97)
MICROALBUMIN/CREAT UR: NORMAL MG/G{CREAT}
MONOCYTES # BLD AUTO: 0.46 10*3/MM3 (ref 0.1–0.9)
MONOCYTES NFR BLD AUTO: 8.8 % (ref 5–12)
NEUTROPHILS NFR BLD AUTO: 3.21 10*3/MM3 (ref 1.7–7)
NEUTROPHILS NFR BLD AUTO: 61.3 % (ref 42.7–76)
NOROVIRUS GI+II RNA STL QL NAA+NON-PROBE: NOT DETECTED
P SHIGELLOIDES DNA STL QL NAA+NON-PROBE: NOT DETECTED
PLATELET # BLD AUTO: 224 10*3/MM3 (ref 140–450)
PMV BLD AUTO: 13.5 FL (ref 6–12)
RBC # BLD AUTO: 4.5 10*6/MM3 (ref 3.77–5.28)
RVA RNA STL QL NAA+NON-PROBE: NOT DETECTED
S ENT+BONG DNA STL QL NAA+NON-PROBE: NOT DETECTED
SAPO I+II+IV+V RNA STL QL NAA+NON-PROBE: NOT DETECTED
SHIGELLA SP+EIEC IPAH ST NAA+NON-PROBE: NOT DETECTED
V CHOL+PARA+VUL DNA STL QL NAA+NON-PROBE: NOT DETECTED
V CHOLERAE DNA STL QL NAA+NON-PROBE: NOT DETECTED
WBC NRBC COR # BLD AUTO: 5.23 10*3/MM3 (ref 3.4–10.8)
Y ENTEROCOL DNA STL QL NAA+NON-PROBE: NOT DETECTED

## 2025-05-10 PROCEDURE — 87493 C DIFF AMPLIFIED PROBE: CPT

## 2025-05-10 PROCEDURE — 87507 IADNA-DNA/RNA PROBE TQ 12-25: CPT

## 2025-05-12 ENCOUNTER — RESULTS FOLLOW-UP (OUTPATIENT)
Age: 65
End: 2025-05-12
Payer: COMMERCIAL

## 2025-05-12 PROBLEM — E11.29 DIABETES MELLITUS WITH PROTEINURIA: Status: RESOLVED | Noted: 2025-04-07 | Resolved: 2025-05-12

## 2025-05-12 PROBLEM — R80.9 DIABETES MELLITUS WITH PROTEINURIA: Status: RESOLVED | Noted: 2025-04-07 | Resolved: 2025-05-12

## 2025-05-12 NOTE — TELEPHONE ENCOUNTER
Name: Eliz Velazquez    Relationship: Self    Best Callback Number: [phone]    HUB PROVIDED THE RELAY MESSAGE FROM THE OFFICE   PATIENT VOICED UNDERSTANDING AND HAS NO FURTHER QUESTIONS AT THIS TIME    ADDITIONAL INFORMATION:

## 2025-05-12 NOTE — LETTER
Eliz JAYNA Velazquez  [address]  [place] [address]    May 12, 2025     Dear Ms. Velazquez:    Below are the results from your recent visit:    Resulted Orders   Microalbumin / Creatinine Urine Ratio - Urine, Clean Catch   Result Value Ref Range    Microalbumin/Creatinine Ratio        Comment:      Unable to calculate    Creatinine, Urine 34.8 mg/dL    Microalbumin, Urine <1.2 mg/dL   Comprehensive Metabolic Panel   Result Value Ref Range    Glucose 154 (H) 65 - 99 mg/dL    BUN 16 8 - 23 mg/dL    Creatinine 0.58 0.57 - 1.00 mg/dL    Sodium 140 136 - 145 mmol/L    Potassium 4.4 3.5 - 5.2 mmol/L    Chloride 106 98 - 107 mmol/L    CO2 25.0 22.0 - 29.0 mmol/L    Calcium 9.3 8.6 - 10.5 mg/dL    Total Protein 6.9 6.0 - 8.5 g/dL    Albumin 3.8 3.5 - 5.2 g/dL    ALT (SGPT) 46 (H) 1 - 33 U/L    AST (SGOT) 33 (H) 1 - 32 U/L    Alkaline Phosphatase 116 39 - 117 U/L    Total Bilirubin 0.2 0.0 - 1.2 mg/dL    Globulin 3.1 gm/dL    A/G Ratio 1.2 g/dL    BUN/Creatinine Ratio 27.6 (H) 7.0 - 25.0    Anion Gap 9.0 5.0 - 15.0 mmol/L    eGFR 100.6 >60.0 mL/min/1.73   CBC Auto Differential   Result Value Ref Range    WBC 5.23 3.40 - 10.80 10*3/mm3    RBC 4.50 3.77 - 5.28 10*6/mm3    Hemoglobin 13.1 12.0 - 15.9 g/dL    Hematocrit 39.9 34.0 - 46.6 %    MCV 88.7 79.0 - 97.0 fL    MCH 29.1 26.6 - 33.0 pg    MCHC 32.8 31.5 - 35.7 g/dL    RDW 12.2 (L) 12.3 - 15.4 %    RDW-SD 39.5 37.0 - 54.0 fl    MPV 13.5 (H) 6.0 - 12.0 fL    Platelets 224 140 - 450 10*3/mm3    Neutrophil % 61.3 42.7 - 76.0 %    Lymphocyte % 25.6 19.6 - 45.3 %    Monocyte % 8.8 5.0 - 12.0 %    Eosinophil % 3.1 0.3 - 6.2 %    Basophil % 0.6 0.0 - 1.5 %    Immature Grans % 0.6 (H) 0.0 - 0.5 %    Neutrophils, Absolute 3.21 1.70 - 7.00 10*3/mm3    Lymphocytes, Absolute 1.34 0.70 - 3.10 10*3/mm3    Monocytes, Absolute 0.46 0.10 - 0.90 10*3/mm3    Eosinophils, Absolute 0.16 0.00 - 0.40 10*3/mm3    Basophils, Absolute 0.03 0.00 - 0.20 10*3/mm3    Immature Grans, Absolute 0.03 0.00 -  0.05 10*3/mm3   Gastrointestinal Panel, PCR - Stool, Per Rectum   Result Value Ref Range    Campylobacter Not Detected Not Detected    Plesiomonas shigelloides Not Detected Not Detected    Salmonella Not Detected Not Detected    Vibrio Not Detected Not Detected    Vibrio cholerae Not Detected Not Detected    Yersinia enterocolitica Not Detected Not Detected    Enteroaggregative E. coli (EAEC) Not Detected Not Detected    Enteropathogenic E. coli (EPEC) Not Detected Not Detected    Enterotoxigenic E. coli (ETEC) lt/st Not Detected Not Detected    Shiga-like toxin-producing E. coli (STEC) stx1/stx2 Not Detected Not Detected    Shigella/Enteroinvasive E. coli (EIEC) Not Detected Not Detected    Cryptosporidium Not Detected Not Detected    Cyclospora cayetanensis Not Detected Not Detected    Entamoeba histolytica Not Detected Not Detected    Giardia lamblia Not Detected Not Detected    Adenovirus F40/41 Not Detected Not Detected    Astrovirus Detected (A) Not Detected    Norovirus GI/GII Not Detected Not Detected    Rotavirus A Not Detected Not Detected    Sapovirus (I, II, IV or V) Not Detected Not Detected   Clostridioides difficile Toxin, PCR - Stool, Per Rectum   Result Value Ref Range    Toxigenic C. difficile by PCR Not Detected Not Detected   You have astrovirus infection.  There is no specific treatment or medications.  I do not recommend Imodium because it is an infectious cause.  Continue with the medication changes as stopping Rybelsus and metformin for now.    Normal white blood cell count, no anemia, normal platelets.  Liver enzymes remain mildly elevated.  Normal electrolytes.No protein present in your urine.  Check again annually.    If you have any questions or concerns, please don't hesitate to call.  Sincerely,  Sunitha Tobin MD

## 2025-07-07 ENCOUNTER — LAB (OUTPATIENT)
Age: 65
End: 2025-07-07
Payer: COMMERCIAL

## 2025-07-07 ENCOUNTER — OFFICE VISIT (OUTPATIENT)
Age: 65
End: 2025-07-07
Payer: COMMERCIAL

## 2025-07-07 VITALS
OXYGEN SATURATION: 95 % | HEIGHT: 60 IN | HEART RATE: 86 BPM | BODY MASS INDEX: 42.7 KG/M2 | DIASTOLIC BLOOD PRESSURE: 74 MMHG | WEIGHT: 217.5 LBS | SYSTOLIC BLOOD PRESSURE: 126 MMHG

## 2025-07-07 DIAGNOSIS — Z00.00 WELL ADULT EXAM: ICD-10-CM

## 2025-07-07 DIAGNOSIS — Z78.0 MENOPAUSE: ICD-10-CM

## 2025-07-07 DIAGNOSIS — E78.00 PURE HYPERCHOLESTEROLEMIA: ICD-10-CM

## 2025-07-07 DIAGNOSIS — M15.0 PRIMARY OSTEOARTHRITIS INVOLVING MULTIPLE JOINTS: ICD-10-CM

## 2025-07-07 DIAGNOSIS — E11.65 TYPE 2 DIABETES MELLITUS WITH HYPERGLYCEMIA, WITHOUT LONG-TERM CURRENT USE OF INSULIN: ICD-10-CM

## 2025-07-07 DIAGNOSIS — E66.813 CLASS 3 SEVERE OBESITY DUE TO EXCESS CALORIES WITH SERIOUS COMORBIDITY AND BODY MASS INDEX (BMI) OF 40.0 TO 44.9 IN ADULT: ICD-10-CM

## 2025-07-07 DIAGNOSIS — M85.88 OTHER SPECIFIED DISORDERS OF BONE DENSITY AND STRUCTURE, OTHER SITE: ICD-10-CM

## 2025-07-07 DIAGNOSIS — R92.8 ABNORMAL MAMMOGRAM: ICD-10-CM

## 2025-07-07 DIAGNOSIS — Z00.00 WELL ADULT EXAM: Primary | ICD-10-CM

## 2025-07-07 DIAGNOSIS — I10 PRIMARY HYPERTENSION: ICD-10-CM

## 2025-07-07 PROBLEM — M15.9 GENERALIZED OSTEOARTHRITIS: Status: ACTIVE | Noted: 2025-07-07

## 2025-07-07 LAB
ALBUMIN SERPL-MCNC: 4.4 G/DL (ref 3.5–5.2)
ALBUMIN/GLOB SERPL: 1.2 G/DL
ALP SERPL-CCNC: 49 U/L (ref 39–117)
ALT SERPL W P-5'-P-CCNC: 21 U/L (ref 1–33)
ANION GAP SERPL CALCULATED.3IONS-SCNC: 12.8 MMOL/L (ref 5–15)
AST SERPL-CCNC: 30 U/L (ref 1–32)
BILIRUB SERPL-MCNC: 0.4 MG/DL (ref 0–1.2)
BUN SERPL-MCNC: 13 MG/DL (ref 8–23)
BUN/CREAT SERPL: 11.8 (ref 7–25)
CALCIUM SPEC-SCNC: 9.9 MG/DL (ref 8.6–10.5)
CHLORIDE SERPL-SCNC: 102 MMOL/L (ref 98–107)
CHOLEST SERPL-MCNC: 194 MG/DL (ref 0–200)
CO2 SERPL-SCNC: 22.2 MMOL/L (ref 22–29)
CREAT SERPL-MCNC: 1.1 MG/DL (ref 0.57–1)
DEPRECATED RDW RBC AUTO: 43 FL (ref 37–54)
EGFRCR SERPLBLD CKD-EPI 2021: 55.9 ML/MIN/1.73
ERYTHROCYTE [DISTWIDTH] IN BLOOD BY AUTOMATED COUNT: 12.5 % (ref 12.3–15.4)
EXPIRATION DATE: ABNORMAL
GLOBULIN UR ELPH-MCNC: 3.8 GM/DL
GLUCOSE SERPL-MCNC: 85 MG/DL (ref 65–99)
HBA1C MFR BLD: 7.4 % (ref 4.5–5.7)
HCT VFR BLD AUTO: 47.6 % (ref 34–46.6)
HDLC SERPL-MCNC: 61 MG/DL (ref 40–60)
HGB BLD-MCNC: 15.3 G/DL (ref 12–15.9)
LDLC SERPL CALC-MCNC: 110 MG/DL (ref 0–100)
LDLC/HDLC SERPL: 1.76 {RATIO}
Lab: ABNORMAL
MCH RBC QN AUTO: 30.4 PG (ref 26.6–33)
MCHC RBC AUTO-ENTMCNC: 32.1 G/DL (ref 31.5–35.7)
MCV RBC AUTO: 94.6 FL (ref 79–97)
PLATELET # BLD AUTO: 221 10*3/MM3 (ref 140–450)
PMV BLD AUTO: 12.5 FL (ref 6–12)
POTASSIUM SERPL-SCNC: 4.7 MMOL/L (ref 3.5–5.2)
PROT SERPL-MCNC: 8.2 G/DL (ref 6–8.5)
RBC # BLD AUTO: 5.03 10*6/MM3 (ref 3.77–5.28)
SODIUM SERPL-SCNC: 137 MMOL/L (ref 136–145)
TRIGL SERPL-MCNC: 128 MG/DL (ref 0–150)
VLDLC SERPL-MCNC: 23 MG/DL (ref 5–40)
WBC NRBC COR # BLD AUTO: 7.08 10*3/MM3 (ref 3.4–10.8)

## 2025-07-07 PROCEDURE — 80061 LIPID PANEL: CPT | Performed by: FAMILY MEDICINE

## 2025-07-07 PROCEDURE — 99397 PER PM REEVAL EST PAT 65+ YR: CPT | Performed by: FAMILY MEDICINE

## 2025-07-07 PROCEDURE — 36415 COLL VENOUS BLD VENIPUNCTURE: CPT | Performed by: FAMILY MEDICINE

## 2025-07-07 PROCEDURE — 83036 HEMOGLOBIN GLYCOSYLATED A1C: CPT | Performed by: FAMILY MEDICINE

## 2025-07-07 PROCEDURE — 85027 COMPLETE CBC AUTOMATED: CPT | Performed by: FAMILY MEDICINE

## 2025-07-07 PROCEDURE — 99214 OFFICE O/P EST MOD 30 MIN: CPT | Performed by: FAMILY MEDICINE

## 2025-07-07 PROCEDURE — 80053 COMPREHEN METABOLIC PANEL: CPT | Performed by: FAMILY MEDICINE

## 2025-07-07 RX ORDER — LOSARTAN POTASSIUM 100 MG/1
100 TABLET ORAL DAILY
Qty: 90 TABLET | Refills: 1 | Status: SHIPPED | OUTPATIENT
Start: 2025-07-07

## 2025-07-07 RX ORDER — CELECOXIB 200 MG/1
200 CAPSULE ORAL 2 TIMES DAILY
Qty: 180 CAPSULE | Refills: 3 | Status: SHIPPED | OUTPATIENT
Start: 2025-07-07

## 2025-07-07 RX ORDER — GLIPIZIDE 10 MG/1
10 TABLET, FILM COATED, EXTENDED RELEASE ORAL DAILY
Qty: 90 TABLET | Refills: 1 | Status: SHIPPED | OUTPATIENT
Start: 2025-07-07

## 2025-07-07 RX ORDER — ATORVASTATIN CALCIUM 40 MG/1
40 TABLET, FILM COATED ORAL NIGHTLY
Qty: 90 TABLET | Refills: 3 | Status: SHIPPED | OUTPATIENT
Start: 2025-07-07
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
## 2025-07-08 ENCOUNTER — RESULTS FOLLOW-UP (OUTPATIENT)
Age: 65
End: 2025-07-08
Payer: COMMERCIAL

## 2025-07-14 ENCOUNTER — TELEPHONE (OUTPATIENT)
Age: 65
End: 2025-07-14
Payer: COMMERCIAL

## 2025-08-25 ENCOUNTER — HOSPITAL ENCOUNTER (OUTPATIENT)
Dept: BONE DENSITY | Facility: HOSPITAL | Age: 65
Discharge: HOME | End: 2025-08-25
Payer: COMMERCIAL

## 2025-08-25 ENCOUNTER — HOSPITAL ENCOUNTER (OUTPATIENT)
Dept: MAMMOGRAPHY | Facility: HOSPITAL | Age: 65
Discharge: HOME | End: 2025-08-25
Payer: COMMERCIAL

## 2025-08-25 DIAGNOSIS — Z78.0 MENOPAUSE: ICD-10-CM

## 2025-08-25 DIAGNOSIS — R92.8 ABNORMAL MAMMOGRAM: ICD-10-CM

## 2025-08-25 DIAGNOSIS — M85.88 OTHER SPECIFIED DISORDERS OF BONE DENSITY AND STRUCTURE, OTHER SITE: ICD-10-CM

## 2025-08-25 PROCEDURE — 77066 DX MAMMO INCL CAD BI: CPT | Performed by: RADIOLOGY

## 2025-08-25 PROCEDURE — G0279 TOMOSYNTHESIS, MAMMO: HCPCS

## 2025-08-25 PROCEDURE — 77062 BREAST TOMOSYNTHESIS BI: CPT | Performed by: RADIOLOGY

## 2025-08-25 PROCEDURE — 77066 DX MAMMO INCL CAD BI: CPT

## 2025-08-25 PROCEDURE — 77080 DXA BONE DENSITY AXIAL: CPT
